# Patient Record
Sex: FEMALE | Race: WHITE | NOT HISPANIC OR LATINO | Employment: OTHER | URBAN - METROPOLITAN AREA
[De-identification: names, ages, dates, MRNs, and addresses within clinical notes are randomized per-mention and may not be internally consistent; named-entity substitution may affect disease eponyms.]

---

## 2018-07-08 ENCOUNTER — HOSPITAL ENCOUNTER (EMERGENCY)
Facility: HOSPITAL | Age: 63
Discharge: HOME | End: 2018-07-08
Attending: EMERGENCY MEDICINE | Admitting: EMERGENCY MEDICINE

## 2018-07-08 ENCOUNTER — APPOINTMENT (OUTPATIENT)
Dept: GENERAL RADIOLOGY | Facility: HOSPITAL | Age: 63
End: 2018-07-08

## 2018-07-08 VITALS
DIASTOLIC BLOOD PRESSURE: 64 MMHG | TEMPERATURE: 96.8 F | HEIGHT: 61 IN | WEIGHT: 200 LBS | HEART RATE: 66 BPM | BODY MASS INDEX: 37.76 KG/M2 | OXYGEN SATURATION: 92 % | RESPIRATION RATE: 18 BRPM | SYSTOLIC BLOOD PRESSURE: 121 MMHG

## 2018-07-08 DIAGNOSIS — M54.42 ACUTE LEFT-SIDED LOW BACK PAIN WITH LEFT-SIDED SCIATICA: Primary | ICD-10-CM

## 2018-07-08 PROCEDURE — 72110 X-RAY EXAM L-2 SPINE 4/>VWS: CPT

## 2018-07-08 PROCEDURE — 25010000002 KETOROLAC TROMETHAMINE PER 15 MG: Performed by: EMERGENCY MEDICINE

## 2018-07-08 PROCEDURE — 73502 X-RAY EXAM HIP UNI 2-3 VIEWS: CPT

## 2018-07-08 PROCEDURE — 63710000001 PREDNISONE PER 1 MG: Performed by: EMERGENCY MEDICINE

## 2018-07-08 PROCEDURE — 99283 EMERGENCY DEPT VISIT LOW MDM: CPT

## 2018-07-08 PROCEDURE — 96372 THER/PROPH/DIAG INJ SC/IM: CPT

## 2018-07-08 RX ORDER — HYDROCODONE BITARTRATE AND ACETAMINOPHEN 5; 325 MG/1; MG/1
1 TABLET ORAL EVERY 6 HOURS PRN
Qty: 12 TABLET | Refills: 0 | Status: SHIPPED | OUTPATIENT
Start: 2018-07-08 | End: 2020-01-01

## 2018-07-08 RX ORDER — PREDNISONE 20 MG/1
60 TABLET ORAL ONCE
Status: COMPLETED | OUTPATIENT
Start: 2018-07-08 | End: 2018-07-08

## 2018-07-08 RX ORDER — METHYLPREDNISOLONE 4 MG/1
TABLET ORAL
Qty: 21 EACH | Refills: 0 | Status: SHIPPED | OUTPATIENT
Start: 2018-07-08 | End: 2020-01-01

## 2018-07-08 RX ORDER — KETOROLAC TROMETHAMINE 30 MG/ML
30 INJECTION, SOLUTION INTRAMUSCULAR; INTRAVENOUS ONCE
Status: COMPLETED | OUTPATIENT
Start: 2018-07-08 | End: 2018-07-08

## 2018-07-08 RX ORDER — OXYCODONE HYDROCHLORIDE AND ACETAMINOPHEN 5; 325 MG/1; MG/1
1 TABLET ORAL ONCE
Status: COMPLETED | OUTPATIENT
Start: 2018-07-08 | End: 2018-07-08

## 2018-07-08 RX ORDER — NAPROXEN 500 MG/1
500 TABLET ORAL 2 TIMES DAILY PRN
Qty: 20 TABLET | Refills: 0 | Status: SHIPPED | OUTPATIENT
Start: 2018-07-08 | End: 2020-01-01

## 2018-07-08 RX ORDER — METAXALONE 800 MG/1
800 TABLET ORAL 3 TIMES DAILY
Qty: 15 TABLET | Refills: 0 | Status: SHIPPED | OUTPATIENT
Start: 2018-07-08 | End: 2020-01-01

## 2018-07-08 RX ADMIN — PREDNISONE 60 MG: 20 TABLET ORAL at 08:38

## 2018-07-08 RX ADMIN — OXYCODONE HYDROCHLORIDE AND ACETAMINOPHEN 1 TABLET: 5; 325 TABLET ORAL at 08:38

## 2018-07-08 RX ADMIN — KETOROLAC TROMETHAMINE 30 MG: 30 INJECTION, SOLUTION INTRAMUSCULAR at 08:38

## 2018-07-08 NOTE — ED PROVIDER NOTES
EMERGENCY DEPARTMENT ENCOUNTER    CHIEF COMPLAINT  Chief Complaint: lower back pain  History given by: patient  History limited by: nothing  Room Number: 26/26  PMD: Jorge Abel MD      HPI:  Pt is a 62 y.o. female who presents complaining of left lower back pain that began one week ago. Her pain began in the middle of her lower back and has since moved to the left lower back and posterior left hip. Her pain radiates down the posterior aspect of her left leg and it is worsened by movement or changing position. Pt denies saddle anaesthesia, focal weakness, and urinary or fecal incontinence. She has no other complaints at this time.    Duration:  One week  Onset: gradual  Timing: constant  Location: left lower back/posterior hip  Radiation: down posterior left leg  Quality: pain  Intensity/Severity: moderate  Progression: worsening  Associated Symptoms: none  Aggravating Factors: movement  Alleviating Factors: positional rest  Previous Episodes: none  Treatment before arrival: none    PAST MEDICAL HISTORY  Active Ambulatory Problems     Diagnosis Date Noted   • No Active Ambulatory Problems     Resolved Ambulatory Problems     Diagnosis Date Noted   • No Resolved Ambulatory Problems     Past Medical History:   Diagnosis Date   • Disease of thyroid gland    • Fibromyalgia    • Hypertension        PAST SURGICAL HISTORY  Past Surgical History:   Procedure Laterality Date   • APPENDECTOMY     • CHOLECYSTECTOMY     • KNEE SURGERY Right        FAMILY HISTORY  History reviewed. No pertinent family history.    SOCIAL HISTORY  Social History     Social History   • Marital status:      Spouse name: N/A   • Number of children: N/A   • Years of education: N/A     Occupational History   • Not on file.     Social History Main Topics   • Smoking status: Never Smoker   • Smokeless tobacco: Never Used   • Alcohol use No   • Drug use: No   • Sexual activity: Defer     Other Topics Concern   • Not on file     Social  History Narrative   • No narrative on file       ALLERGIES  Phenergan [promethazine hcl]    REVIEW OF SYSTEMS  Review of Systems   Constitutional: Negative for fever.   HENT: Negative for sore throat.    Eyes: Negative.    Respiratory: Negative for cough and shortness of breath.    Cardiovascular: Negative for chest pain.   Gastrointestinal: Negative for abdominal pain, diarrhea and vomiting.   Genitourinary: Negative for dysuria.   Musculoskeletal: Positive for arthralgias (left posterior hip) and back pain (left lower). Negative for neck pain.   Skin: Negative for rash.   Allergic/Immunologic: Negative.    Neurological: Negative for weakness, numbness and headaches.   Hematological: Negative.    Psychiatric/Behavioral: Negative.    All other systems reviewed and are negative.      PHYSICAL EXAM  ED Triage Vitals   Temp Heart Rate Resp BP SpO2   07/08/18 0740 07/08/18 0740 07/08/18 0740 07/08/18 0747 07/08/18 0740   96.8 °F (36 °C) 62 18 176/89 92 %      Temp src Heart Rate Source Patient Position BP Location FiO2 (%)   07/08/18 0740 07/08/18 0740 07/08/18 0747 -- --   Tympanic Monitor Sitting         Physical Exam   Constitutional: She is oriented to person, place, and time. No distress.   HENT:   Head: Normocephalic and atraumatic.   Eyes: EOM are normal. Pupils are equal, round, and reactive to light.   Neck: Normal range of motion. Neck supple.   Cardiovascular: Normal rate, regular rhythm and normal heart sounds.    Pulses:       Dorsalis pedis pulses are 2+ on the right side, and 2+ on the left side.   Pulmonary/Chest: Effort normal and breath sounds normal. No respiratory distress.   Abdominal: Soft. There is no tenderness. There is no rebound and no guarding.   Musculoskeletal: Normal range of motion. She exhibits no edema.   Mild tenderness of lower lumbar spine, left SI joint and left hip.    Neurological: She is alert and oriented to person, place, and time. She has normal sensation and normal strength.  She has an abnormal Straight Leg Raise Test (left at 30 degrees, normal on the right).   Normal sensation in feet. No saddle anaesthesia   Skin: Skin is warm and dry. No rash noted.   Psychiatric: Mood and affect normal.   Nursing note and vitals reviewed.        RADIOLOGY  XR Spine Lumbar 4+ View   Final Result       No acute fracture is identified. Degenerative changes. For further   evaluation, if clinically indicated, MRI could be considered.       This report was finalized on 7/8/2018 9:37 AM by Dr. Devin Esteves M.D.          XR Hip With or Without Pelvis 2 - 3 View Left   Final Result       No acute fracture is identified. If there is further clinical concern,   MRI could be considered for further evaluation.       This report was finalized on 7/8/2018 9:35 AM by Dr. Devin Esteves M.D.               I ordered the above noted radiological studies. Interpreted by radiologist. Reviewed by me in PACS.       PROCEDURES  Procedures      PROGRESS AND CONSULTS       0812  Ordered XR left hip and XR L-spine for further evaluation. Ordered prednisone, percocet, and toradol for her pain.    1009  Rechecked pt who is resting comfortably. Informed Pt that her XR L-spine and XR left hip show nothing acute. Informed Pt that her history and exam are consistent with sciatica. Discussed plans to discharge Pt home with prescriptions for pain medication, muscle relaxers, and oral steroids to alleviate her pain. Instructed her to rest and follow up with her PCP as needed if symptoms persist. Pt understands and agrees to all plans. All questions answered.     1014  HUNTER query complete. Treatment plan to include limited course of prescribed controlled substance. Risks including addiction, benefits, and alternatives presented to patient.   Pt filled an rx for 180 tramadol on 7/1/18    MEDICAL DECISION MAKING  Results were reviewed/discussed with the patient and they were also made aware of online access. Pt also made  aware that some labs, such as cultures, will not be resulted during ER visit and follow up with PMD is necessary.     MDM  Number of Diagnoses or Management Options  Acute left-sided low back pain with left-sided sciatica:   Diagnosis management comments: Patient had a normal neuro exam.  There was no saddle anesthesia.  She was able to ambulate.  X-ray showed some degenerative changes and lumbar spine.  Symptoms are consistent with sciatica.  Patient will be given prescriptions for Naprosyn, Skelaxin, Norco, and a Medrol Dosepak.  She was advised to follow-up with primary care doctor for persistent symptoms.       Amount and/or Complexity of Data Reviewed  Tests in the radiology section of CPT®: reviewed and ordered (XR L-spine and XR left hip show nothing acute.)           DIAGNOSIS  Final diagnoses:   Acute left-sided low back pain with left-sided sciatica       DISPOSITION  DISCHARGE    Patient discharged in stable condition.    Reviewed implications of results, diagnosis, meds, responsibility to follow up, warning signs and symptoms of possible worsening, potential complications and reasons to return to ER, including new or worsening symptoms.    Patient/Family voiced understanding of above instructions.    Discussed plan for discharge, as there is no emergent indication for admission. Patient referred to primary care provider for BP management due to today's BP. Pt/family is agreeable and understands need for follow up and repeat testing.  Pt is aware that discharge does not mean that nothing is wrong but it indicates no emergency is present that requires admission and they must continue care with follow-up as given below or physician of their choice.     FOLLOW-UP  Jorge Abel [place]  [address] [address] [address] [phone]    Call in 4 days  If symptoms persist         Medication List      New Prescriptions    HYDROcodone-acetaminophen 5-325 MG per tablet  Commonly known as:   NORCO  Take 1 tablet by mouth Every 6 (Six) Hours As Needed for Moderate Pain .     metaxalone 800 MG tablet  Commonly known as:  SKELAXIN  Take 1 tablet by mouth 3 (Three) Times a Day.     MethylPREDNISolone 4 MG tablet  Commonly known as:  MEDROL (BATSHEVA)  Take as directed on package instructions.     naproxen 500 MG tablet  Commonly known as:  NAPROSYN  Take 1 tablet by mouth 2 (Two) Times a Day As Needed for Moderate Pain .              Latest Documented Vital Signs:  As of 10:17 AM  BP- 124/67 HR- 62 Temp- 96.8 °F (36 °C) (Tympanic) O2 sat- 92%    --  Documentation assistance provided by maritza Lundy for Dr. Iqbal.  Information recorded by the scribe was done at my direction and has been verified and validated by me.     Sara Lundy  07/08/18 1017       Usman Iqbal MD  07/08/18 0875

## 2018-07-08 NOTE — DISCHARGE INSTRUCTIONS
Take medications as prescribed.  Do not take tramadol/Ultram while taking hydrocodone.  Follow-up with your doctor later this week if symptoms are not improving.  Return to emergency department for worsening pain, weakness in your legs, bowel or bladder problems, numbness in your groin, or other concern.

## 2018-07-08 NOTE — ED TRIAGE NOTES
LEFT LOWER BACK PAIN RADIATES DOWN LEFT LEG. DENIES KNOWN INJURY. PT STATES THIS HAS HAPPENED TO HER BEFORE NOT THIS SEVERE.

## 2019-01-01 ENCOUNTER — HOSPITAL ENCOUNTER (OUTPATIENT)
Dept: CARDIOLOGY | Facility: HOSPITAL | Age: 64
Discharge: HOME | End: 2019-10-16
Admitting: PAIN MEDICINE

## 2019-01-01 ENCOUNTER — TRANSCRIBE ORDERS (OUTPATIENT)
Dept: ADMINISTRATIVE | Facility: HOSPITAL | Age: 64
End: 2019-01-01

## 2019-01-01 DIAGNOSIS — M79.89 LEFT LEG SWELLING: ICD-10-CM

## 2019-01-01 DIAGNOSIS — M79.605 LEFT LEG PAIN: Primary | ICD-10-CM

## 2019-01-01 DIAGNOSIS — M79.605 LEFT LEG PAIN: ICD-10-CM

## 2019-01-01 LAB
BH CV LOW VAS LEFT POPLITEAL SPONT: 1
BH CV LOWER VASCULAR LEFT COMMON FEMORAL AUGMENT: NORMAL
BH CV LOWER VASCULAR LEFT COMMON FEMORAL COMPETENT: NORMAL
BH CV LOWER VASCULAR LEFT COMMON FEMORAL COMPRESS: NORMAL
BH CV LOWER VASCULAR LEFT COMMON FEMORAL PHASIC: NORMAL
BH CV LOWER VASCULAR LEFT COMMON FEMORAL SPONT: NORMAL
BH CV LOWER VASCULAR LEFT DISTAL FEMORAL COMPRESS: NORMAL
BH CV LOWER VASCULAR LEFT GASTRONEMIUS COMPRESS: NORMAL
BH CV LOWER VASCULAR LEFT GREATER SAPH AK COMPRESS: NORMAL
BH CV LOWER VASCULAR LEFT GREATER SAPH BK COMPRESS: NORMAL
BH CV LOWER VASCULAR LEFT MID FEMORAL AUGMENT: NORMAL
BH CV LOWER VASCULAR LEFT MID FEMORAL COMPETENT: NORMAL
BH CV LOWER VASCULAR LEFT MID FEMORAL COMPRESS: NORMAL
BH CV LOWER VASCULAR LEFT MID FEMORAL PHASIC: NORMAL
BH CV LOWER VASCULAR LEFT MID FEMORAL SPONT: NORMAL
BH CV LOWER VASCULAR LEFT PERONEAL COMPRESS: NORMAL
BH CV LOWER VASCULAR LEFT POPLITEAL AUGMENT: NORMAL
BH CV LOWER VASCULAR LEFT POPLITEAL COMPETENT: NORMAL
BH CV LOWER VASCULAR LEFT POPLITEAL COMPRESS: NORMAL
BH CV LOWER VASCULAR LEFT POPLITEAL PHASIC: NORMAL
BH CV LOWER VASCULAR LEFT POPLITEAL SPONT: NORMAL
BH CV LOWER VASCULAR LEFT POSTERIOR TIBIAL COMPRESS: NORMAL
BH CV LOWER VASCULAR LEFT PROXIMAL FEMORAL COMPRESS: NORMAL
BH CV LOWER VASCULAR LEFT SAPHENOFEMORAL JUNCTION COMPRESS: NORMAL
BH CV LOWER VASCULAR RIGHT COMMON FEMORAL AUGMENT: NORMAL
BH CV LOWER VASCULAR RIGHT COMMON FEMORAL COMPETENT: NORMAL
BH CV LOWER VASCULAR RIGHT COMMON FEMORAL COMPRESS: NORMAL
BH CV LOWER VASCULAR RIGHT COMMON FEMORAL PHASIC: NORMAL
BH CV LOWER VASCULAR RIGHT COMMON FEMORAL SPONT: NORMAL
BH CV POP FLUID COLLECT LEFT: 1

## 2019-01-01 PROCEDURE — 93971 EXTREMITY STUDY: CPT

## 2019-06-18 ENCOUNTER — APPOINTMENT (OUTPATIENT)
Dept: GENERAL RADIOLOGY | Facility: HOSPITAL | Age: 64
End: 2019-06-18

## 2019-06-18 ENCOUNTER — HOSPITAL ENCOUNTER (EMERGENCY)
Facility: HOSPITAL | Age: 64
Discharge: HOME | End: 2019-06-18
Attending: EMERGENCY MEDICINE | Admitting: EMERGENCY MEDICINE

## 2019-06-18 ENCOUNTER — APPOINTMENT (OUTPATIENT)
Dept: CT IMAGING | Facility: HOSPITAL | Age: 64
End: 2019-06-18

## 2019-06-18 VITALS
HEART RATE: 79 BPM | OXYGEN SATURATION: 93 % | HEIGHT: 61 IN | DIASTOLIC BLOOD PRESSURE: 93 MMHG | SYSTOLIC BLOOD PRESSURE: 129 MMHG | BODY MASS INDEX: 43.8 KG/M2 | RESPIRATION RATE: 16 BRPM | TEMPERATURE: 98.5 F | WEIGHT: 232 LBS

## 2019-06-18 DIAGNOSIS — R51.9 FACIAL PAIN, ACUTE: ICD-10-CM

## 2019-06-18 DIAGNOSIS — H66.92 LEFT OTITIS MEDIA, UNSPECIFIED OTITIS MEDIA TYPE: Primary | ICD-10-CM

## 2019-06-18 LAB
ALBUMIN SERPL-MCNC: 4.1 G/DL (ref 3.5–5.2)
ALBUMIN/GLOB SERPL: 1.1 G/DL
ALP SERPL-CCNC: 79 U/L (ref 39–117)
ALT SERPL W P-5'-P-CCNC: 17 U/L (ref 1–33)
ANION GAP SERPL CALCULATED.3IONS-SCNC: 14.7 MMOL/L
AST SERPL-CCNC: 17 U/L (ref 1–32)
B PARAPERT DNA SPEC QL NAA+PROBE: NOT DETECTED
B PERT DNA SPEC QL NAA+PROBE: NOT DETECTED
BACTERIA UR QL AUTO: ABNORMAL /HPF
BASOPHILS # BLD AUTO: 0.04 10*3/MM3 (ref 0–0.2)
BASOPHILS NFR BLD AUTO: 0.6 % (ref 0–1.5)
BILIRUB SERPL-MCNC: 0.6 MG/DL (ref 0.2–1.2)
BILIRUB UR QL STRIP: NEGATIVE
BUN BLD-MCNC: 30 MG/DL (ref 8–23)
BUN/CREAT SERPL: 21.3 (ref 7–25)
C PNEUM DNA NPH QL NAA+NON-PROBE: NOT DETECTED
CALCIUM SPEC-SCNC: 9.6 MG/DL (ref 8.6–10.5)
CHLORIDE SERPL-SCNC: 104 MMOL/L (ref 98–107)
CLARITY UR: CLEAR
CO2 SERPL-SCNC: 22.3 MMOL/L (ref 22–29)
COLOR UR: YELLOW
CREAT BLD-MCNC: 1.41 MG/DL (ref 0.57–1)
DEPRECATED RDW RBC AUTO: 44.3 FL (ref 37–54)
EOSINOPHIL # BLD AUTO: 0.24 10*3/MM3 (ref 0–0.4)
EOSINOPHIL NFR BLD AUTO: 3.7 % (ref 0.3–6.2)
ERYTHROCYTE [DISTWIDTH] IN BLOOD BY AUTOMATED COUNT: 13.1 % (ref 12.3–15.4)
FLUAV H1 2009 PAND RNA NPH QL NAA+PROBE: NOT DETECTED
FLUAV H1 HA GENE NPH QL NAA+PROBE: NOT DETECTED
FLUAV H3 RNA NPH QL NAA+PROBE: NOT DETECTED
FLUAV SUBTYP SPEC NAA+PROBE: NOT DETECTED
FLUBV RNA ISLT QL NAA+PROBE: NOT DETECTED
GFR SERPL CREATININE-BSD FRML MDRD: 38 ML/MIN/1.73
GLOBULIN UR ELPH-MCNC: 3.9 GM/DL
GLUCOSE BLD-MCNC: 97 MG/DL (ref 65–99)
GLUCOSE UR STRIP-MCNC: NEGATIVE MG/DL
HADV DNA SPEC NAA+PROBE: NOT DETECTED
HCOV 229E RNA SPEC QL NAA+PROBE: NOT DETECTED
HCOV HKU1 RNA SPEC QL NAA+PROBE: NOT DETECTED
HCOV NL63 RNA SPEC QL NAA+PROBE: NOT DETECTED
HCOV OC43 RNA SPEC QL NAA+PROBE: NOT DETECTED
HCT VFR BLD AUTO: 39.2 % (ref 34–46.6)
HGB BLD-MCNC: 12.8 G/DL (ref 12–15.9)
HGB UR QL STRIP.AUTO: NEGATIVE
HMPV RNA NPH QL NAA+NON-PROBE: NOT DETECTED
HOLD SPECIMEN: NORMAL
HPIV1 RNA SPEC QL NAA+PROBE: NOT DETECTED
HPIV2 RNA SPEC QL NAA+PROBE: NOT DETECTED
HPIV3 RNA NPH QL NAA+PROBE: NOT DETECTED
HPIV4 P GENE NPH QL NAA+PROBE: NOT DETECTED
HYALINE CASTS UR QL AUTO: ABNORMAL /LPF
IMM GRANULOCYTES # BLD AUTO: 0.02 10*3/MM3 (ref 0–0.05)
IMM GRANULOCYTES NFR BLD AUTO: 0.3 % (ref 0–0.5)
KETONES UR QL STRIP: NEGATIVE
LEUKOCYTE ESTERASE UR QL STRIP.AUTO: ABNORMAL
LYMPHOCYTES # BLD AUTO: 1.04 10*3/MM3 (ref 0.7–3.1)
LYMPHOCYTES NFR BLD AUTO: 16 % (ref 19.6–45.3)
M PNEUMO IGG SER IA-ACNC: NOT DETECTED
MCH RBC QN AUTO: 30 PG (ref 26.6–33)
MCHC RBC AUTO-ENTMCNC: 32.7 G/DL (ref 31.5–35.7)
MCV RBC AUTO: 92 FL (ref 79–97)
MONOCYTES # BLD AUTO: 0.51 10*3/MM3 (ref 0.1–0.9)
MONOCYTES NFR BLD AUTO: 7.8 % (ref 5–12)
NEUTROPHILS # BLD AUTO: 4.66 10*3/MM3 (ref 1.7–7)
NEUTROPHILS NFR BLD AUTO: 71.6 % (ref 42.7–76)
NITRITE UR QL STRIP: NEGATIVE
NRBC BLD AUTO-RTO: 0 /100 WBC (ref 0–0.2)
PH UR STRIP.AUTO: <=5 [PH] (ref 5–8)
PLATELET # BLD AUTO: 326 10*3/MM3 (ref 140–450)
PMV BLD AUTO: 10.5 FL (ref 6–12)
POTASSIUM BLD-SCNC: 3.5 MMOL/L (ref 3.5–5.2)
PROT SERPL-MCNC: 8 G/DL (ref 6–8.5)
PROT UR QL STRIP: NEGATIVE
RBC # BLD AUTO: 4.26 10*6/MM3 (ref 3.77–5.28)
RBC # UR: ABNORMAL /HPF
REF LAB TEST METHOD: ABNORMAL
RHINOVIRUS RNA SPEC NAA+PROBE: NOT DETECTED
RSV RNA NPH QL NAA+NON-PROBE: NOT DETECTED
SODIUM BLD-SCNC: 141 MMOL/L (ref 136–145)
SP GR UR STRIP: 1.02 (ref 1–1.03)
SQUAMOUS #/AREA URNS HPF: ABNORMAL /HPF
TROPONIN T SERPL-MCNC: <0.01 NG/ML (ref 0–0.03)
UROBILINOGEN UR QL STRIP: ABNORMAL
WBC NRBC COR # BLD: 6.51 10*3/MM3 (ref 3.4–10.8)
WBC UR QL AUTO: ABNORMAL /HPF
WHOLE BLOOD HOLD SPECIMEN: NORMAL

## 2019-06-18 PROCEDURE — 25010000002 KETOROLAC TROMETHAMINE PER 15 MG: Performed by: NURSE PRACTITIONER

## 2019-06-18 PROCEDURE — 87486 CHLMYD PNEUM DNA AMP PROBE: CPT | Performed by: NURSE PRACTITIONER

## 2019-06-18 PROCEDURE — 96374 THER/PROPH/DIAG INJ IV PUSH: CPT

## 2019-06-18 PROCEDURE — 70491 CT SOFT TISSUE NECK W/DYE: CPT

## 2019-06-18 PROCEDURE — 84484 ASSAY OF TROPONIN QUANT: CPT | Performed by: NURSE PRACTITIONER

## 2019-06-18 PROCEDURE — 93010 ELECTROCARDIOGRAM REPORT: CPT | Performed by: INTERNAL MEDICINE

## 2019-06-18 PROCEDURE — 87581 M.PNEUMON DNA AMP PROBE: CPT | Performed by: NURSE PRACTITIONER

## 2019-06-18 PROCEDURE — 25010000002 IOPAMIDOL 61 % SOLUTION: Performed by: EMERGENCY MEDICINE

## 2019-06-18 PROCEDURE — 81001 URINALYSIS AUTO W/SCOPE: CPT | Performed by: NURSE PRACTITIONER

## 2019-06-18 PROCEDURE — 87633 RESP VIRUS 12-25 TARGETS: CPT | Performed by: NURSE PRACTITIONER

## 2019-06-18 PROCEDURE — 87798 DETECT AGENT NOS DNA AMP: CPT | Performed by: NURSE PRACTITIONER

## 2019-06-18 PROCEDURE — 96375 TX/PRO/DX INJ NEW DRUG ADDON: CPT

## 2019-06-18 PROCEDURE — 80053 COMPREHEN METABOLIC PANEL: CPT | Performed by: NURSE PRACTITIONER

## 2019-06-18 PROCEDURE — 93005 ELECTROCARDIOGRAM TRACING: CPT | Performed by: NURSE PRACTITIONER

## 2019-06-18 PROCEDURE — 85025 COMPLETE CBC W/AUTO DIFF WBC: CPT | Performed by: NURSE PRACTITIONER

## 2019-06-18 PROCEDURE — 99284 EMERGENCY DEPT VISIT MOD MDM: CPT

## 2019-06-18 PROCEDURE — 25010000002 MORPHINE PER 10 MG: Performed by: NURSE PRACTITIONER

## 2019-06-18 PROCEDURE — 71045 X-RAY EXAM CHEST 1 VIEW: CPT

## 2019-06-18 PROCEDURE — 25010000002 ONDANSETRON PER 1 MG: Performed by: NURSE PRACTITIONER

## 2019-06-18 RX ORDER — AMLODIPINE BESYLATE 5 MG/1
5 TABLET ORAL DAILY
COMMUNITY
End: 2020-01-01 | Stop reason: HOSPADM

## 2019-06-18 RX ORDER — KETOROLAC TROMETHAMINE 15 MG/ML
15 INJECTION, SOLUTION INTRAMUSCULAR; INTRAVENOUS ONCE
Status: COMPLETED | OUTPATIENT
Start: 2019-06-18 | End: 2019-06-18

## 2019-06-18 RX ORDER — CEFDINIR 300 MG/1
300 CAPSULE ORAL 2 TIMES DAILY
Qty: 14 CAPSULE | Refills: 0 | Status: SHIPPED | OUTPATIENT
Start: 2019-06-18 | End: 2019-06-25

## 2019-06-18 RX ORDER — SODIUM CHLORIDE 0.9 % (FLUSH) 0.9 %
10 SYRINGE (ML) INJECTION AS NEEDED
Status: DISCONTINUED | OUTPATIENT
Start: 2019-06-18 | End: 2019-06-18 | Stop reason: HOSPADM

## 2019-06-18 RX ORDER — ONDANSETRON 4 MG/1
4 TABLET, FILM COATED ORAL EVERY 8 HOURS PRN
Qty: 10 TABLET | Refills: 0 | Status: SHIPPED | OUTPATIENT
Start: 2019-06-18 | End: 2020-01-01

## 2019-06-18 RX ORDER — LOSARTAN POTASSIUM 25 MG/1
25 TABLET ORAL DAILY
COMMUNITY
End: 2020-01-01 | Stop reason: DRUGHIGH

## 2019-06-18 RX ORDER — ONDANSETRON 2 MG/ML
4 INJECTION INTRAMUSCULAR; INTRAVENOUS ONCE
Status: COMPLETED | OUTPATIENT
Start: 2019-06-18 | End: 2019-06-18

## 2019-06-18 RX ORDER — MORPHINE SULFATE 2 MG/ML
4 INJECTION, SOLUTION INTRAMUSCULAR; INTRAVENOUS ONCE
Status: COMPLETED | OUTPATIENT
Start: 2019-06-18 | End: 2019-06-18

## 2019-06-18 RX ADMIN — KETOROLAC TROMETHAMINE 15 MG: 15 INJECTION, SOLUTION INTRAMUSCULAR; INTRAVENOUS at 15:49

## 2019-06-18 RX ADMIN — ONDANSETRON 4 MG: 2 INJECTION INTRAMUSCULAR; INTRAVENOUS at 16:20

## 2019-06-18 RX ADMIN — ONDANSETRON HYDROCHLORIDE 4 MG: 2 SOLUTION INTRAMUSCULAR; INTRAVENOUS at 15:50

## 2019-06-18 RX ADMIN — SODIUM CHLORIDE 1000 ML: 9 INJECTION, SOLUTION INTRAVENOUS at 15:49

## 2019-06-18 RX ADMIN — MORPHINE SULFATE 4 MG: 2 INJECTION, SOLUTION INTRAMUSCULAR; INTRAVENOUS at 16:20

## 2019-06-18 RX ADMIN — IOPAMIDOL 75 ML: 612 INJECTION, SOLUTION INTRAVENOUS at 17:23

## 2020-01-01 ENCOUNTER — APPOINTMENT (OUTPATIENT)
Dept: CT IMAGING | Facility: HOSPITAL | Age: 65
End: 2020-01-01

## 2020-01-01 ENCOUNTER — OFFICE VISIT (OUTPATIENT)
Dept: GASTROENTEROLOGY | Facility: CLINIC | Age: 65
End: 2020-01-01

## 2020-01-01 ENCOUNTER — APPOINTMENT (OUTPATIENT)
Dept: GENERAL RADIOLOGY | Facility: HOSPITAL | Age: 65
End: 2020-01-01

## 2020-01-01 ENCOUNTER — APPOINTMENT (OUTPATIENT)
Dept: CARDIOLOGY | Facility: HOSPITAL | Age: 65
End: 2020-01-01

## 2020-01-01 ENCOUNTER — APPOINTMENT (OUTPATIENT)
Dept: NEUROLOGY | Facility: HOSPITAL | Age: 65
End: 2020-01-01

## 2020-01-01 ENCOUNTER — OUTSIDE FACILITY SERVICE (OUTPATIENT)
Dept: GASTROENTEROLOGY | Facility: CLINIC | Age: 65
End: 2020-01-01

## 2020-01-01 ENCOUNTER — READMISSION MANAGEMENT (OUTPATIENT)
Dept: CALL CENTER | Facility: HOSPITAL | Age: 65
End: 2020-01-01

## 2020-01-01 ENCOUNTER — HOSPITAL ENCOUNTER (OUTPATIENT)
Dept: NUCLEAR MEDICINE | Facility: HOSPITAL | Age: 65
Discharge: HOME | End: 2020-02-03

## 2020-01-01 ENCOUNTER — TRANSCRIBE ORDERS (OUTPATIENT)
Dept: ADMINISTRATIVE | Facility: HOSPITAL | Age: 65
End: 2020-01-01

## 2020-01-01 ENCOUNTER — HOSPITAL ENCOUNTER (INPATIENT)
Facility: HOSPITAL | Age: 65
LOS: 3 days | Discharge: HOME | End: 2020-07-12
Attending: EMERGENCY MEDICINE | Admitting: INTERNAL MEDICINE

## 2020-01-01 ENCOUNTER — PREP FOR SURGERY (OUTPATIENT)
Dept: OTHER | Facility: HOSPITAL | Age: 65
End: 2020-01-01

## 2020-01-01 ENCOUNTER — HOSPITAL ENCOUNTER (INPATIENT)
Facility: HOSPITAL | Age: 65
LOS: 2 days | Discharge: HOME | End: 2020-07-24
Attending: EMERGENCY MEDICINE | Admitting: HOSPITALIST

## 2020-01-01 ENCOUNTER — LAB REQUISITION (OUTPATIENT)
Dept: LAB | Facility: HOSPITAL | Age: 65
End: 2020-01-01

## 2020-01-01 ENCOUNTER — HOSPITAL ENCOUNTER (INPATIENT)
Facility: HOSPITAL | Age: 65
LOS: 6 days | End: 2020-08-19
Attending: EMERGENCY MEDICINE | Admitting: INTERNAL MEDICINE

## 2020-01-01 VITALS
RESPIRATION RATE: 18 BRPM | WEIGHT: 239.42 LBS | SYSTOLIC BLOOD PRESSURE: 145 MMHG | TEMPERATURE: 98.2 F | HEIGHT: 61 IN | HEART RATE: 75 BPM | DIASTOLIC BLOOD PRESSURE: 85 MMHG | BODY MASS INDEX: 45.2 KG/M2 | OXYGEN SATURATION: 96 %

## 2020-01-01 VITALS — HEIGHT: 61 IN | BODY MASS INDEX: 47.7 KG/M2 | WEIGHT: 252.65 LBS | TEMPERATURE: 99.5 F

## 2020-01-01 VITALS
WEIGHT: 236.33 LBS | OXYGEN SATURATION: 90 % | HEART RATE: 90 BPM | RESPIRATION RATE: 18 BRPM | DIASTOLIC BLOOD PRESSURE: 70 MMHG | SYSTOLIC BLOOD PRESSURE: 123 MMHG | HEIGHT: 61 IN | TEMPERATURE: 98.3 F | BODY MASS INDEX: 44.62 KG/M2

## 2020-01-01 VITALS
DIASTOLIC BLOOD PRESSURE: 88 MMHG | BODY MASS INDEX: 43.61 KG/M2 | SYSTOLIC BLOOD PRESSURE: 140 MMHG | OXYGEN SATURATION: 97 % | WEIGHT: 231 LBS | HEIGHT: 61 IN | TEMPERATURE: 98.1 F | HEART RATE: 101 BPM

## 2020-01-01 DIAGNOSIS — I10 HYPERTENSION, UNSPECIFIED TYPE: ICD-10-CM

## 2020-01-01 DIAGNOSIS — I61.9 INTRAPARENCHYMAL HEMORRHAGE OF BRAIN (HCC): Primary | ICD-10-CM

## 2020-01-01 DIAGNOSIS — R11.0 NAUSEA: ICD-10-CM

## 2020-01-01 DIAGNOSIS — I82.492 ACUTE DEEP VEIN THROMBOSIS (DVT) OF OTHER SPECIFIED VEIN OF LEFT LOWER EXTREMITY (HCC): ICD-10-CM

## 2020-01-01 DIAGNOSIS — G90.8 AUTOIMMUNE AUTONOMIC NEUROPATHY: ICD-10-CM

## 2020-01-01 DIAGNOSIS — N17.9 ACUTE KIDNEY INJURY (HCC): ICD-10-CM

## 2020-01-01 DIAGNOSIS — R11.2 NAUSEA AND VOMITING IN ADULT: Primary | ICD-10-CM

## 2020-01-01 DIAGNOSIS — R68.81 EARLY SATIETY: ICD-10-CM

## 2020-01-01 DIAGNOSIS — K76.9 HEPATIC NEUROPATHY: Primary | ICD-10-CM

## 2020-01-01 DIAGNOSIS — M54.9 BILATERAL BACK PAIN, UNSPECIFIED BACK LOCATION, UNSPECIFIED CHRONICITY: ICD-10-CM

## 2020-01-01 DIAGNOSIS — R79.1 ELEVATED INR: ICD-10-CM

## 2020-01-01 DIAGNOSIS — Z78.9 FAILURE OF OUTPATIENT TREATMENT: ICD-10-CM

## 2020-01-01 DIAGNOSIS — R19.7 DIARRHEA, UNSPECIFIED TYPE: ICD-10-CM

## 2020-01-01 DIAGNOSIS — I26.99 PULMONARY EMBOLISM, BILATERAL (HCC): Primary | ICD-10-CM

## 2020-01-01 DIAGNOSIS — I82.4Y3 DEEP VEIN THROMBOSIS (DVT) OF PROXIMAL VEIN OF BOTH LOWER EXTREMITIES, UNSPECIFIED CHRONICITY (HCC): Primary | ICD-10-CM

## 2020-01-01 DIAGNOSIS — R09.02 HYPOXIA: ICD-10-CM

## 2020-01-01 DIAGNOSIS — G90.8 AUTOIMMUNE AUTONOMIC NEUROPATHY: Primary | ICD-10-CM

## 2020-01-01 DIAGNOSIS — R11.2 NAUSEA WITH VOMITING, UNSPECIFIED: ICD-10-CM

## 2020-01-01 DIAGNOSIS — Z12.11 COLON CANCER SCREENING: ICD-10-CM

## 2020-01-01 DIAGNOSIS — G99.0 HEPATIC NEUROPATHY: Primary | ICD-10-CM

## 2020-01-01 DIAGNOSIS — R77.8 ELEVATED TROPONIN: ICD-10-CM

## 2020-01-01 LAB
ABO GROUP BLD: NORMAL
ALBUMIN SERPL-MCNC: 2.3 G/DL (ref 3.5–5.2)
ALBUMIN SERPL-MCNC: 2.8 G/DL (ref 3.5–5.2)
ALBUMIN SERPL-MCNC: 2.9 G/DL (ref 3.5–5.2)
ALBUMIN SERPL-MCNC: 3.4 G/DL (ref 3.5–5.2)
ALBUMIN SERPL-MCNC: 3.8 G/DL (ref 3.5–5.2)
ALBUMIN SERPL-MCNC: 3.8 G/DL (ref 3.5–5.2)
ALBUMIN SERPL-MCNC: 3.9 G/DL (ref 3.5–5.2)
ALBUMIN SERPL-MCNC: 4 G/DL (ref 3.5–5.2)
ALBUMIN/GLOB SERPL: 0.7 G/DL
ALBUMIN/GLOB SERPL: 0.8 G/DL
ALBUMIN/GLOB SERPL: 0.9 G/DL
ALBUMIN/GLOB SERPL: 0.9 G/DL
ALBUMIN/GLOB SERPL: 1.1 G/DL
ALP SERPL-CCNC: 52 U/L (ref 39–117)
ALP SERPL-CCNC: 57 U/L (ref 39–117)
ALP SERPL-CCNC: 57 U/L (ref 39–117)
ALP SERPL-CCNC: 66 U/L (ref 39–117)
ALP SERPL-CCNC: 67 U/L (ref 39–117)
ALP SERPL-CCNC: 72 U/L (ref 39–117)
ALP SERPL-CCNC: 94 U/L (ref 39–117)
ALT SERPL W P-5'-P-CCNC: 17 U/L (ref 1–33)
ALT SERPL W P-5'-P-CCNC: 18 U/L (ref 1–33)
ALT SERPL W P-5'-P-CCNC: 18 U/L (ref 1–33)
ALT SERPL W P-5'-P-CCNC: 19 U/L (ref 1–33)
ALT SERPL W P-5'-P-CCNC: 19 U/L (ref 1–33)
ALT SERPL W P-5'-P-CCNC: 52 U/L (ref 1–33)
ALT SERPL W P-5'-P-CCNC: 70 U/L (ref 1–33)
ANION GAP SERPL CALCULATED.3IONS-SCNC: 10 MMOL/L (ref 5–15)
ANION GAP SERPL CALCULATED.3IONS-SCNC: 10.2 MMOL/L (ref 5–15)
ANION GAP SERPL CALCULATED.3IONS-SCNC: 10.6 MMOL/L (ref 5–15)
ANION GAP SERPL CALCULATED.3IONS-SCNC: 11 MMOL/L (ref 5–15)
ANION GAP SERPL CALCULATED.3IONS-SCNC: 11.2 MMOL/L (ref 5–15)
ANION GAP SERPL CALCULATED.3IONS-SCNC: 11.2 MMOL/L (ref 5–15)
ANION GAP SERPL CALCULATED.3IONS-SCNC: 11.6 MMOL/L (ref 5–15)
ANION GAP SERPL CALCULATED.3IONS-SCNC: 12 MMOL/L (ref 5–15)
ANION GAP SERPL CALCULATED.3IONS-SCNC: 12.2 MMOL/L (ref 5–15)
ANION GAP SERPL CALCULATED.3IONS-SCNC: 13.3 MMOL/L (ref 5–15)
ANION GAP SERPL CALCULATED.3IONS-SCNC: 6.5 MMOL/L (ref 5–15)
ANION GAP SERPL CALCULATED.3IONS-SCNC: 7.6 MMOL/L (ref 5–15)
ANION GAP SERPL CALCULATED.3IONS-SCNC: 7.8 MMOL/L (ref 5–15)
ANION GAP SERPL CALCULATED.3IONS-SCNC: 8.5 MMOL/L (ref 5–15)
ANION GAP SERPL CALCULATED.3IONS-SCNC: 9.9 MMOL/L (ref 5–15)
ANTI-PHOSPHATIDIC ACID: ABNORMAL
ANTI-PHOSPHATIDIC,IGA: 3.9 U/ML
ANTI-PHOSPHATIDIC,IGG: 2 U/ML
ANTI-PHOSPHATIDIC,IGM: 14.7 U/ML
ANTI-PHOSPHATIDYL GLYCEROL, IGA: 5 U/ML
ANTI-PHOSPHATIDYL GLYCEROL, IGG: 6.1 U/ML
ANTI-PHOSPHATIDYL GLYCEROL, IGM: 4.4 U/ML
ANTI-PHOSPHATIDYL GLYCEROL: ABNORMAL
ANTI-PHOSPHATIDYL INOSITOL: ABNORMAL
ANTI-PHOSPHATIDYL,IGA: 5.3 U/ML
ANTI-PHOSPHATIDYL,IGG: 3.8 U/ML
ANTI-PHOSPHATIDYL,IGM: 3.5 U/ML
ANTI-PHOSPHATIDYLETHANOLAMINE, IGA: 8.9 U/ML
ANTI-PHOSPHATIDYLETHANOLAMINE, IGG: 4.9 U/ML
ANTI-PHOSPHATIDYLETHANOLAMINE, IGM: 0.9 U/ML
ANTI-PHOSPHATIDYLETHANOLAMINE: ABNORMAL
APTT PPP: 126.4 SECONDS (ref 22.7–35.4)
APTT PPP: 24.8 SECONDS (ref 22.7–35.4)
APTT PPP: 26.9 SECONDS (ref 22.7–35.4)
APTT PPP: 28.7 SECONDS (ref 22.7–35.4)
APTT PPP: 35.3 SECONDS (ref 22.7–35.4)
APTT PPP: 64.3 SECONDS (ref 22.7–35.4)
APTT PPP: 74.1 SECONDS (ref 22.7–35.4)
APTT PPP: 81.9 SECONDS (ref 22.7–35.4)
APTT PPP: >200 SECONDS (ref 22.7–35.4)
ARTERIAL PATENCY WRIST A: POSITIVE
AST SERPL-CCNC: 15 U/L (ref 1–32)
AST SERPL-CCNC: 16 U/L (ref 1–32)
AST SERPL-CCNC: 17 U/L (ref 1–32)
AST SERPL-CCNC: 18 U/L (ref 1–32)
AST SERPL-CCNC: 20 U/L (ref 1–32)
AST SERPL-CCNC: 51 U/L (ref 1–32)
AST SERPL-CCNC: 56 U/L (ref 1–32)
AT III PPP CHRO-ACNC: 96 % (ref 90–134)
ATMOSPHERIC PRESS: 751.9 MMHG
B PARAPERT DNA SPEC QL NAA+PROBE: NOT DETECTED
B PERT DNA SPEC QL NAA+PROBE: NOT DETECTED
B2 GLYCOPROT1 IGA SER-ACNC: <9 GPI IGA UNITS (ref 0–25)
B2 GLYCOPROT1 IGG SER-ACNC: <9 GPI IGG UNITS (ref 0–20)
B2 GLYCOPROT1 IGM SER-ACNC: <9 GPI IGM UNITS (ref 0–32)
BACTERIA SPEC RESP CULT: ABNORMAL
BASE EXCESS BLDA CALC-SCNC: 2.1 MMOL/L (ref 0–2)
BASOPHILS # BLD AUTO: 0.02 10*3/MM3 (ref 0–0.2)
BASOPHILS # BLD AUTO: 0.03 10*3/MM3 (ref 0–0.2)
BASOPHILS # BLD AUTO: 0.03 10*3/MM3 (ref 0–0.2)
BASOPHILS # BLD AUTO: 0.04 10*3/MM3 (ref 0–0.2)
BASOPHILS # BLD AUTO: 0.08 10*3/MM3 (ref 0–0.2)
BASOPHILS NFR BLD AUTO: 0.2 % (ref 0–1.5)
BASOPHILS NFR BLD AUTO: 0.3 % (ref 0–1.5)
BASOPHILS NFR BLD AUTO: 0.3 % (ref 0–1.5)
BASOPHILS NFR BLD AUTO: 0.4 % (ref 0–1.5)
BASOPHILS NFR BLD AUTO: 0.5 % (ref 0–1.5)
BASOPHILS NFR BLD AUTO: 0.7 % (ref 0–1.5)
BASOPHILS NFR BLD AUTO: 0.8 % (ref 0–1.5)
BDY SITE: ABNORMAL
BH CV ECHO MEAS - AO MAX PG (FULL): 5.3 MMHG
BH CV ECHO MEAS - AO MAX PG: 10.1 MMHG
BH CV ECHO MEAS - AO MEAN PG (FULL): 2 MMHG
BH CV ECHO MEAS - AO MEAN PG: 4 MMHG
BH CV ECHO MEAS - AO V2 MAX: 159 CM/SEC
BH CV ECHO MEAS - AO V2 MEAN: 95.6 CM/SEC
BH CV ECHO MEAS - AO V2 VTI: 23.5 CM
BH CV ECHO MEAS - AVA(I,A): 2.8 CM^2
BH CV ECHO MEAS - AVA(I,D): 2.8 CM^2
BH CV ECHO MEAS - AVA(V,A): 2.4 CM^2
BH CV ECHO MEAS - AVA(V,D): 2.4 CM^2
BH CV ECHO MEAS - BSA(HAYCOCK): 2.2 M^2
BH CV ECHO MEAS - BSA: 2 M^2
BH CV ECHO MEAS - BZI_BMI: 43.9 KILOGRAMS/M^2
BH CV ECHO MEAS - BZI_METRIC_HEIGHT: 154 CM
BH CV ECHO MEAS - BZI_METRIC_WEIGHT: 104 KG
BH CV ECHO MEAS - EDV(CUBED): 59.3 ML
BH CV ECHO MEAS - EDV(MOD-SP4): 58 ML
BH CV ECHO MEAS - EDV(TEICH): 65.9 ML
BH CV ECHO MEAS - EF(CUBED): 73.7 %
BH CV ECHO MEAS - EF(MOD-SP4): 77.6 %
BH CV ECHO MEAS - EF(TEICH): 66.1 %
BH CV ECHO MEAS - ESV(CUBED): 15.6 ML
BH CV ECHO MEAS - ESV(MOD-SP4): 13 ML
BH CV ECHO MEAS - ESV(TEICH): 22.3 ML
BH CV ECHO MEAS - FS: 35.9 %
BH CV ECHO MEAS - IVS/LVPW: 0.91
BH CV ECHO MEAS - IVSD: 1 CM
BH CV ECHO MEAS - LV DIASTOLIC VOL/BSA (35-75): 29.1 ML/M^2
BH CV ECHO MEAS - LV MASS(C)D: 131 GRAMS
BH CV ECHO MEAS - LV MASS(C)DI: 65.7 GRAMS/M^2
BH CV ECHO MEAS - LV MAX PG: 4.8 MMHG
BH CV ECHO MEAS - LV MEAN PG: 2 MMHG
BH CV ECHO MEAS - LV SYSTOLIC VOL/BSA (12-30): 6.5 ML/M^2
BH CV ECHO MEAS - LV V1 MAX: 110 CM/SEC
BH CV ECHO MEAS - LV V1 MEAN: 73.4 CM/SEC
BH CV ECHO MEAS - LV V1 VTI: 18.9 CM
BH CV ECHO MEAS - LVIDD: 3.9 CM
BH CV ECHO MEAS - LVIDS: 2.5 CM
BH CV ECHO MEAS - LVLD AP4: 7.8 CM
BH CV ECHO MEAS - LVLS AP4: 4.8 CM
BH CV ECHO MEAS - LVOT AREA (M): 3.5 CM^2
BH CV ECHO MEAS - LVOT AREA: 3.5 CM^2
BH CV ECHO MEAS - LVOT DIAM: 2.1 CM
BH CV ECHO MEAS - LVPWD: 1.1 CM
BH CV ECHO MEAS - PA ACC TIME: 0.05 SEC
BH CV ECHO MEAS - PA PR(ACCEL): 56.5 MMHG
BH CV ECHO MEAS - RV MAX PG: 2.5 MMHG
BH CV ECHO MEAS - RV MEAN PG: 1 MMHG
BH CV ECHO MEAS - RV V1 MAX: 79 CM/SEC
BH CV ECHO MEAS - RV V1 MEAN: 50.7 CM/SEC
BH CV ECHO MEAS - RV V1 VTI: 16.2 CM
BH CV ECHO MEAS - RVSP: 57 MMHG
BH CV ECHO MEAS - SI(CUBED): 21.9 ML/M^2
BH CV ECHO MEAS - SI(LVOT): 32.8 ML/M^2
BH CV ECHO MEAS - SI(MOD-SP4): 22.6 ML/M^2
BH CV ECHO MEAS - SI(TEICH): 21.9 ML/M^2
BH CV ECHO MEAS - SV(CUBED): 43.7 ML
BH CV ECHO MEAS - SV(LVOT): 65.5 ML
BH CV ECHO MEAS - SV(MOD-SP4): 45 ML
BH CV ECHO MEAS - SV(TEICH): 43.6 ML
BH CV ECHO MEAS - TR MAX PG: 49 MMHG
BH CV ECHO MEAS - TR MAX VEL: 358 CM/SEC
BH CV LOW VAS LEFT LESSER SAPH VESSEL: 1
BH CV LOW VAS LEFT POPLITEAL SPONT: 1
BH CV LOW VAS RIGHT POSTERIOR TIBIAL VESSEL: 1
BH CV LOW VAS RIGHT POSTERIOR TIBIAL VESSEL: 1
BH CV LOWER VASCULAR LEFT COMMON FEMORAL AUGMENT: NORMAL
BH CV LOWER VASCULAR LEFT COMMON FEMORAL COMPETENT: NORMAL
BH CV LOWER VASCULAR LEFT COMMON FEMORAL COMPRESS: NORMAL
BH CV LOWER VASCULAR LEFT COMMON FEMORAL PHASIC: NORMAL
BH CV LOWER VASCULAR LEFT COMMON FEMORAL SPONT: NORMAL
BH CV LOWER VASCULAR LEFT DISTAL FEMORAL COMPRESS: NORMAL
BH CV LOWER VASCULAR LEFT GASTRONEMIUS COMPRESS: NORMAL
BH CV LOWER VASCULAR LEFT GREATER SAPH AK COMPRESS: NORMAL
BH CV LOWER VASCULAR LEFT GREATER SAPH BK COMPRESS: NORMAL
BH CV LOWER VASCULAR LEFT LESSER SAPH COMPRESS: NORMAL
BH CV LOWER VASCULAR LEFT LESSER SAPH THROMBUS: NORMAL
BH CV LOWER VASCULAR LEFT MID FEMORAL AUGMENT: NORMAL
BH CV LOWER VASCULAR LEFT MID FEMORAL COMPETENT: NORMAL
BH CV LOWER VASCULAR LEFT MID FEMORAL COMPRESS: NORMAL
BH CV LOWER VASCULAR LEFT MID FEMORAL PHASIC: NORMAL
BH CV LOWER VASCULAR LEFT MID FEMORAL SPONT: NORMAL
BH CV LOWER VASCULAR LEFT PERONEAL COMPRESS: NORMAL
BH CV LOWER VASCULAR LEFT POPLITEAL AUGMENT: NORMAL
BH CV LOWER VASCULAR LEFT POPLITEAL AUGMENT: NORMAL
BH CV LOWER VASCULAR LEFT POPLITEAL COMPETENT: NORMAL
BH CV LOWER VASCULAR LEFT POPLITEAL COMPETENT: NORMAL
BH CV LOWER VASCULAR LEFT POPLITEAL COMPRESS: NORMAL
BH CV LOWER VASCULAR LEFT POPLITEAL PHASIC: NORMAL
BH CV LOWER VASCULAR LEFT POPLITEAL SPONT: NORMAL
BH CV LOWER VASCULAR LEFT POPLITEAL THROMBUS: NORMAL
BH CV LOWER VASCULAR LEFT POSTERIOR TIBIAL COMPRESS: NORMAL
BH CV LOWER VASCULAR LEFT PROFUNDA FEMORAL COMPRESS: NORMAL
BH CV LOWER VASCULAR LEFT PROXIMAL FEMORAL COMPRESS: NORMAL
BH CV LOWER VASCULAR LEFT SAPHENOFEMORAL JUNCTION COMPRESS: NORMAL
BH CV LOWER VASCULAR RIGHT COMMON FEMORAL AUGMENT: NORMAL
BH CV LOWER VASCULAR RIGHT COMMON FEMORAL COMPETENT: NORMAL
BH CV LOWER VASCULAR RIGHT COMMON FEMORAL COMPRESS: NORMAL
BH CV LOWER VASCULAR RIGHT COMMON FEMORAL PHASIC: NORMAL
BH CV LOWER VASCULAR RIGHT COMMON FEMORAL SPONT: NORMAL
BH CV LOWER VASCULAR RIGHT DISTAL FEMORAL COMPRESS: NORMAL
BH CV LOWER VASCULAR RIGHT GASTRONEMIUS COMPRESS: NORMAL
BH CV LOWER VASCULAR RIGHT GREATER SAPH AK COMPRESS: NORMAL
BH CV LOWER VASCULAR RIGHT GREATER SAPH BK COMPRESS: NORMAL
BH CV LOWER VASCULAR RIGHT MID FEMORAL AUGMENT: NORMAL
BH CV LOWER VASCULAR RIGHT MID FEMORAL COMPETENT: NORMAL
BH CV LOWER VASCULAR RIGHT MID FEMORAL COMPRESS: NORMAL
BH CV LOWER VASCULAR RIGHT MID FEMORAL PHASIC: NORMAL
BH CV LOWER VASCULAR RIGHT MID FEMORAL SPONT: NORMAL
BH CV LOWER VASCULAR RIGHT PERONEAL COMPRESS: NORMAL
BH CV LOWER VASCULAR RIGHT POPLITEAL AUGMENT: NORMAL
BH CV LOWER VASCULAR RIGHT POPLITEAL COMPETENT: NORMAL
BH CV LOWER VASCULAR RIGHT POPLITEAL COMPRESS: NORMAL
BH CV LOWER VASCULAR RIGHT POPLITEAL PHASIC: NORMAL
BH CV LOWER VASCULAR RIGHT POPLITEAL SPONT: NORMAL
BH CV LOWER VASCULAR RIGHT POSTERIOR TIBIAL COMPRESS: NORMAL
BH CV LOWER VASCULAR RIGHT POSTERIOR TIBIAL THROMBUS: NORMAL
BH CV LOWER VASCULAR RIGHT POSTERIOR TIBIAL THROMBUS: NORMAL
BH CV LOWER VASCULAR RIGHT PROFUNDA FEMORAL COMPRESS: NORMAL
BH CV LOWER VASCULAR RIGHT PROXIMAL FEMORAL COMPRESS: NORMAL
BH CV LOWER VASCULAR RIGHT SAPHENOFEMORAL JUNCTION COMPRESS: NORMAL
BH CV XLRA - RV BASE: 4 CM
BH CV XLRA - RV LENGTH: 6.3 CM
BH CV XLRA - RV MID: 4.5 CM
BILIRUB SERPL-MCNC: 0.4 MG/DL (ref 0–1.2)
BILIRUB SERPL-MCNC: 0.4 MG/DL (ref 0–1.2)
BILIRUB SERPL-MCNC: 0.5 MG/DL (ref 0–1.2)
BILIRUB SERPL-MCNC: 0.8 MG/DL (ref 0–1.2)
BLD GP AB SCN SERPL QL: NEGATIVE
BUN SERPL-MCNC: 13 MG/DL (ref 8–23)
BUN SERPL-MCNC: 13 MG/DL (ref 8–23)
BUN SERPL-MCNC: 15 MG/DL (ref 8–23)
BUN SERPL-MCNC: 16 MG/DL (ref 8–23)
BUN SERPL-MCNC: 19 MG/DL (ref 8–23)
BUN SERPL-MCNC: 20 MG/DL (ref 8–23)
BUN SERPL-MCNC: 20 MG/DL (ref 8–23)
BUN SERPL-MCNC: 22 MG/DL (ref 8–23)
BUN SERPL-MCNC: 23 MG/DL (ref 8–23)
BUN SERPL-MCNC: 25 MG/DL (ref 8–23)
BUN SERPL-MCNC: 27 MG/DL (ref 8–23)
BUN SERPL-MCNC: 31 MG/DL (ref 8–23)
BUN SERPL-MCNC: 33 MG/DL (ref 8–23)
BUN SERPL-MCNC: 34 MG/DL (ref 8–23)
BUN SERPL-MCNC: 43 MG/DL (ref 8–23)
BUN/CREAT SERPL: 11.3 (ref 7–25)
BUN/CREAT SERPL: 11.6 (ref 7–25)
BUN/CREAT SERPL: 14.3 (ref 7–25)
BUN/CREAT SERPL: 15.2 (ref 7–25)
BUN/CREAT SERPL: 15.8 (ref 7–25)
BUN/CREAT SERPL: 16.4 (ref 7–25)
BUN/CREAT SERPL: 16.5 (ref 7–25)
BUN/CREAT SERPL: 19 (ref 7–25)
BUN/CREAT SERPL: 19.2 (ref 7–25)
BUN/CREAT SERPL: 20.6 (ref 7–25)
BUN/CREAT SERPL: 20.8 (ref 7–25)
BUN/CREAT SERPL: 21.9 (ref 7–25)
BUN/CREAT SERPL: 22.1 (ref 7–25)
BUN/CREAT SERPL: 23 (ref 7–25)
BUN/CREAT SERPL: 34.7 (ref 7–25)
C PNEUM DNA NPH QL NAA+NON-PROBE: NOT DETECTED
CALCIUM SPEC-SCNC: 10.3 MG/DL (ref 8.6–10.5)
CALCIUM SPEC-SCNC: 11 MG/DL (ref 8.6–10.5)
CALCIUM SPEC-SCNC: 6.6 MG/DL (ref 8.6–10.5)
CALCIUM SPEC-SCNC: 7.9 MG/DL (ref 8.6–10.5)
CALCIUM SPEC-SCNC: 8 MG/DL (ref 8.6–10.5)
CALCIUM SPEC-SCNC: 8 MG/DL (ref 8.6–10.5)
CALCIUM SPEC-SCNC: 8.2 MG/DL (ref 8.6–10.5)
CALCIUM SPEC-SCNC: 8.6 MG/DL (ref 8.6–10.5)
CALCIUM SPEC-SCNC: 8.8 MG/DL (ref 8.6–10.5)
CALCIUM SPEC-SCNC: 8.8 MG/DL (ref 8.6–10.5)
CALCIUM SPEC-SCNC: 9.1 MG/DL (ref 8.6–10.5)
CALCIUM SPEC-SCNC: 9.2 MG/DL (ref 8.6–10.5)
CALCIUM SPEC-SCNC: 9.2 MG/DL (ref 8.6–10.5)
CALCIUM SPEC-SCNC: 9.4 MG/DL (ref 8.6–10.5)
CALCIUM SPEC-SCNC: 9.6 MG/DL (ref 8.6–10.5)
CARDIOLIPIN IGG SER IA-ACNC: <9 GPL U/ML (ref 0–14)
CARDIOLIPIN IGM SER IA-ACNC: <9 MPL U/ML (ref 0–12)
CHLORIDE SERPL-SCNC: 100 MMOL/L (ref 98–107)
CHLORIDE SERPL-SCNC: 101 MMOL/L (ref 98–107)
CHLORIDE SERPL-SCNC: 102 MMOL/L (ref 98–107)
CHLORIDE SERPL-SCNC: 103 MMOL/L (ref 98–107)
CHLORIDE SERPL-SCNC: 104 MMOL/L (ref 98–107)
CHLORIDE SERPL-SCNC: 105 MMOL/L (ref 98–107)
CHLORIDE SERPL-SCNC: 106 MMOL/L (ref 98–107)
CHLORIDE SERPL-SCNC: 107 MMOL/L (ref 98–107)
CHLORIDE SERPL-SCNC: 107 MMOL/L (ref 98–107)
CHLORIDE SERPL-SCNC: 109 MMOL/L (ref 98–107)
CHLORIDE SERPL-SCNC: 111 MMOL/L (ref 98–107)
CHLORIDE SERPL-SCNC: 114 MMOL/L (ref 98–107)
CHLORIDE SERPL-SCNC: 120 MMOL/L (ref 98–107)
CO2 SERPL-SCNC: 16.5 MMOL/L (ref 22–29)
CO2 SERPL-SCNC: 19 MMOL/L (ref 22–29)
CO2 SERPL-SCNC: 19.5 MMOL/L (ref 22–29)
CO2 SERPL-SCNC: 19.8 MMOL/L (ref 22–29)
CO2 SERPL-SCNC: 19.8 MMOL/L (ref 22–29)
CO2 SERPL-SCNC: 20.7 MMOL/L (ref 22–29)
CO2 SERPL-SCNC: 21.1 MMOL/L (ref 22–29)
CO2 SERPL-SCNC: 22 MMOL/L (ref 22–29)
CO2 SERPL-SCNC: 22 MMOL/L (ref 22–29)
CO2 SERPL-SCNC: 24.2 MMOL/L (ref 22–29)
CO2 SERPL-SCNC: 24.4 MMOL/L (ref 22–29)
CO2 SERPL-SCNC: 24.4 MMOL/L (ref 22–29)
CO2 SERPL-SCNC: 24.8 MMOL/L (ref 22–29)
CO2 SERPL-SCNC: 25.4 MMOL/L (ref 22–29)
CO2 SERPL-SCNC: 25.8 MMOL/L (ref 22–29)
CREAT SERPL-MCNC: 0.97 MG/DL (ref 0.57–1)
CREAT SERPL-MCNC: 1.01 MG/DL (ref 0.57–1)
CREAT SERPL-MCNC: 1.05 MG/DL (ref 0.57–1)
CREAT SERPL-MCNC: 1.12 MG/DL (ref 0.57–1)
CREAT SERPL-MCNC: 1.13 MG/DL (ref 0.57–1)
CREAT SERPL-MCNC: 1.15 MG/DL (ref 0.57–1)
CREAT SERPL-MCNC: 1.2 MG/DL (ref 0.57–1)
CREAT SERPL-MCNC: 1.21 MG/DL (ref 0.57–1)
CREAT SERPL-MCNC: 1.24 MG/DL (ref 0.57–1)
CREAT SERPL-MCNC: 1.25 MG/DL (ref 0.57–1)
CREAT SERPL-MCNC: 1.3 MG/DL (ref 0.57–1)
CREAT SERPL-MCNC: 1.34 MG/DL (ref 0.57–1)
CREAT SERPL-MCNC: 1.48 MG/DL (ref 0.57–1)
CREAT SERPL-MCNC: 1.51 MG/DL (ref 0.57–1)
CREAT SERPL-MCNC: 1.63 MG/DL (ref 0.57–1)
CYTO UR: NORMAL
DEPRECATED RDW RBC AUTO: 43.9 FL (ref 37–54)
DEPRECATED RDW RBC AUTO: 44 FL (ref 37–54)
DEPRECATED RDW RBC AUTO: 44 FL (ref 37–54)
DEPRECATED RDW RBC AUTO: 44.1 FL (ref 37–54)
DEPRECATED RDW RBC AUTO: 44.5 FL (ref 37–54)
DEPRECATED RDW RBC AUTO: 44.7 FL (ref 37–54)
DEPRECATED RDW RBC AUTO: 44.9 FL (ref 37–54)
DEPRECATED RDW RBC AUTO: 45.4 FL (ref 37–54)
DEPRECATED RDW RBC AUTO: 45.6 FL (ref 37–54)
DEPRECATED RDW RBC AUTO: 45.6 FL (ref 37–54)
DEPRECATED RDW RBC AUTO: 45.8 FL (ref 37–54)
DEPRECATED RDW RBC AUTO: 45.9 FL (ref 37–54)
DEPRECATED RDW RBC AUTO: 46.6 FL (ref 37–54)
DEPRECATED RDW RBC AUTO: 46.7 FL (ref 37–54)
EOSINOPHIL # BLD AUTO: 0 10*3/MM3 (ref 0–0.4)
EOSINOPHIL # BLD AUTO: 0 10*3/MM3 (ref 0–0.4)
EOSINOPHIL # BLD AUTO: 0.04 10*3/MM3 (ref 0–0.4)
EOSINOPHIL # BLD AUTO: 0.06 10*3/MM3 (ref 0–0.4)
EOSINOPHIL # BLD AUTO: 0.07 10*3/MM3 (ref 0–0.4)
EOSINOPHIL # BLD AUTO: 0.08 10*3/MM3 (ref 0–0.4)
EOSINOPHIL # BLD AUTO: 0.08 10*3/MM3 (ref 0–0.4)
EOSINOPHIL # BLD AUTO: 0.11 10*3/MM3 (ref 0–0.4)
EOSINOPHIL # BLD AUTO: 0.15 10*3/MM3 (ref 0–0.4)
EOSINOPHIL # BLD AUTO: 0.25 10*3/MM3 (ref 0–0.4)
EOSINOPHIL NFR BLD AUTO: 0 % (ref 0.3–6.2)
EOSINOPHIL NFR BLD AUTO: 0 % (ref 0.3–6.2)
EOSINOPHIL NFR BLD AUTO: 0.6 % (ref 0.3–6.2)
EOSINOPHIL NFR BLD AUTO: 1 % (ref 0.3–6.2)
EOSINOPHIL NFR BLD AUTO: 1.1 % (ref 0.3–6.2)
EOSINOPHIL NFR BLD AUTO: 1.3 % (ref 0.3–6.2)
EOSINOPHIL NFR BLD AUTO: 1.4 % (ref 0.3–6.2)
EOSINOPHIL NFR BLD AUTO: 1.4 % (ref 0.3–6.2)
EOSINOPHIL NFR BLD AUTO: 2.4 % (ref 0.3–6.2)
EOSINOPHIL NFR BLD AUTO: 2.6 % (ref 0.3–6.2)
ERYTHROCYTE [DISTWIDTH] IN BLOOD BY AUTOMATED COUNT: 12.8 % (ref 12.3–15.4)
ERYTHROCYTE [DISTWIDTH] IN BLOOD BY AUTOMATED COUNT: 12.8 % (ref 12.3–15.4)
ERYTHROCYTE [DISTWIDTH] IN BLOOD BY AUTOMATED COUNT: 12.9 % (ref 12.3–15.4)
ERYTHROCYTE [DISTWIDTH] IN BLOOD BY AUTOMATED COUNT: 13 % (ref 12.3–15.4)
ERYTHROCYTE [DISTWIDTH] IN BLOOD BY AUTOMATED COUNT: 13.1 % (ref 12.3–15.4)
ERYTHROCYTE [DISTWIDTH] IN BLOOD BY AUTOMATED COUNT: 13.2 % (ref 12.3–15.4)
ERYTHROCYTE [DISTWIDTH] IN BLOOD BY AUTOMATED COUNT: 13.2 % (ref 12.3–15.4)
F5 GENE MUT ANL BLD/T: NORMAL
FACTOR II, DNA ANALYSIS: NORMAL
FIBRINOGEN PPP-MCNC: 340 MG/DL (ref 219–464)
FIBRINOGEN PPP-MCNC: 342 MG/DL (ref 219–464)
FLUAV H1 2009 PAND RNA NPH QL NAA+PROBE: NOT DETECTED
FLUAV H1 HA GENE NPH QL NAA+PROBE: NOT DETECTED
FLUAV H3 RNA NPH QL NAA+PROBE: NOT DETECTED
FLUAV SUBTYP SPEC NAA+PROBE: NOT DETECTED
FLUBV RNA ISLT QL NAA+PROBE: NOT DETECTED
GFR SERPL CREATININE-BSD FRML MDRD: 32 ML/MIN/1.73
GFR SERPL CREATININE-BSD FRML MDRD: 35 ML/MIN/1.73
GFR SERPL CREATININE-BSD FRML MDRD: 35 ML/MIN/1.73
GFR SERPL CREATININE-BSD FRML MDRD: 40 ML/MIN/1.73
GFR SERPL CREATININE-BSD FRML MDRD: 41 ML/MIN/1.73
GFR SERPL CREATININE-BSD FRML MDRD: 43 ML/MIN/1.73
GFR SERPL CREATININE-BSD FRML MDRD: 43 ML/MIN/1.73
GFR SERPL CREATININE-BSD FRML MDRD: 45 ML/MIN/1.73
GFR SERPL CREATININE-BSD FRML MDRD: 45 ML/MIN/1.73
GFR SERPL CREATININE-BSD FRML MDRD: 47 ML/MIN/1.73
GFR SERPL CREATININE-BSD FRML MDRD: 48 ML/MIN/1.73
GFR SERPL CREATININE-BSD FRML MDRD: 49 ML/MIN/1.73
GFR SERPL CREATININE-BSD FRML MDRD: 53 ML/MIN/1.73
GFR SERPL CREATININE-BSD FRML MDRD: 55 ML/MIN/1.73
GFR SERPL CREATININE-BSD FRML MDRD: 58 ML/MIN/1.73
GLOBULIN UR ELPH-MCNC: 3 GM/DL
GLOBULIN UR ELPH-MCNC: 3.3 GM/DL
GLOBULIN UR ELPH-MCNC: 3.5 GM/DL
GLOBULIN UR ELPH-MCNC: 3.6 GM/DL
GLOBULIN UR ELPH-MCNC: 3.7 GM/DL
GLOBULIN UR ELPH-MCNC: 3.9 GM/DL
GLOBULIN UR ELPH-MCNC: 4.3 GM/DL
GLUCOSE BLDC GLUCOMTR-MCNC: 107 MG/DL (ref 70–130)
GLUCOSE BLDC GLUCOMTR-MCNC: 108 MG/DL (ref 70–130)
GLUCOSE BLDC GLUCOMTR-MCNC: 109 MG/DL (ref 70–130)
GLUCOSE BLDC GLUCOMTR-MCNC: 109 MG/DL (ref 70–130)
GLUCOSE BLDC GLUCOMTR-MCNC: 111 MG/DL (ref 70–130)
GLUCOSE BLDC GLUCOMTR-MCNC: 113 MG/DL (ref 70–130)
GLUCOSE BLDC GLUCOMTR-MCNC: 114 MG/DL (ref 70–130)
GLUCOSE BLDC GLUCOMTR-MCNC: 115 MG/DL (ref 70–130)
GLUCOSE BLDC GLUCOMTR-MCNC: 117 MG/DL (ref 70–130)
GLUCOSE BLDC GLUCOMTR-MCNC: 117 MG/DL (ref 70–130)
GLUCOSE BLDC GLUCOMTR-MCNC: 118 MG/DL (ref 70–130)
GLUCOSE BLDC GLUCOMTR-MCNC: 123 MG/DL (ref 70–130)
GLUCOSE BLDC GLUCOMTR-MCNC: 123 MG/DL (ref 70–130)
GLUCOSE BLDC GLUCOMTR-MCNC: 126 MG/DL (ref 70–130)
GLUCOSE BLDC GLUCOMTR-MCNC: 128 MG/DL (ref 70–130)
GLUCOSE BLDC GLUCOMTR-MCNC: 130 MG/DL (ref 70–130)
GLUCOSE BLDC GLUCOMTR-MCNC: 134 MG/DL (ref 70–130)
GLUCOSE BLDC GLUCOMTR-MCNC: 140 MG/DL (ref 70–130)
GLUCOSE BLDC GLUCOMTR-MCNC: 141 MG/DL (ref 70–130)
GLUCOSE BLDC GLUCOMTR-MCNC: 141 MG/DL (ref 70–130)
GLUCOSE BLDC GLUCOMTR-MCNC: 154 MG/DL (ref 70–130)
GLUCOSE BLDC GLUCOMTR-MCNC: 161 MG/DL (ref 70–130)
GLUCOSE BLDC GLUCOMTR-MCNC: 177 MG/DL (ref 70–130)
GLUCOSE SERPL-MCNC: 100 MG/DL (ref 65–99)
GLUCOSE SERPL-MCNC: 100 MG/DL (ref 65–99)
GLUCOSE SERPL-MCNC: 101 MG/DL (ref 65–99)
GLUCOSE SERPL-MCNC: 101 MG/DL (ref 65–99)
GLUCOSE SERPL-MCNC: 105 MG/DL (ref 65–99)
GLUCOSE SERPL-MCNC: 107 MG/DL (ref 65–99)
GLUCOSE SERPL-MCNC: 111 MG/DL (ref 65–99)
GLUCOSE SERPL-MCNC: 111 MG/DL (ref 65–99)
GLUCOSE SERPL-MCNC: 128 MG/DL (ref 65–99)
GLUCOSE SERPL-MCNC: 132 MG/DL (ref 65–99)
GLUCOSE SERPL-MCNC: 145 MG/DL (ref 65–99)
GLUCOSE SERPL-MCNC: 169 MG/DL (ref 65–99)
GLUCOSE SERPL-MCNC: 98 MG/DL (ref 65–99)
GRAM STN SPEC: ABNORMAL
HADV DNA SPEC NAA+PROBE: NOT DETECTED
HCO3 BLDA-SCNC: 25.8 MMOL/L (ref 22–28)
HCOV 229E RNA SPEC QL NAA+PROBE: NOT DETECTED
HCOV HKU1 RNA SPEC QL NAA+PROBE: NOT DETECTED
HCOV NL63 RNA SPEC QL NAA+PROBE: NOT DETECTED
HCOV OC43 RNA SPEC QL NAA+PROBE: NOT DETECTED
HCT VFR BLD AUTO: 31.1 % (ref 34–46.6)
HCT VFR BLD AUTO: 31.8 % (ref 34–46.6)
HCT VFR BLD AUTO: 32.6 % (ref 34–46.6)
HCT VFR BLD AUTO: 33.3 % (ref 34–46.6)
HCT VFR BLD AUTO: 33.6 % (ref 34–46.6)
HCT VFR BLD AUTO: 35.4 % (ref 34–46.6)
HCT VFR BLD AUTO: 35.4 % (ref 34–46.6)
HCT VFR BLD AUTO: 35.6 % (ref 34–46.6)
HCT VFR BLD AUTO: 36.3 % (ref 34–46.6)
HCT VFR BLD AUTO: 37 % (ref 34–46.6)
HCT VFR BLD AUTO: 37.9 % (ref 34–46.6)
HCT VFR BLD AUTO: 40.6 % (ref 34–46.6)
HCT VFR BLD AUTO: 42 % (ref 34–46.6)
HCT VFR BLD AUTO: 42.3 % (ref 34–46.6)
HCT VFR BLDA CALC: 39 % (ref 38–51)
HGB BLD-MCNC: 10.4 G/DL (ref 12–15.9)
HGB BLD-MCNC: 10.6 G/DL (ref 12–15.9)
HGB BLD-MCNC: 11.1 G/DL (ref 12–15.9)
HGB BLD-MCNC: 11.2 G/DL (ref 12–15.9)
HGB BLD-MCNC: 11.2 G/DL (ref 12–15.9)
HGB BLD-MCNC: 11.8 G/DL (ref 12–15.9)
HGB BLD-MCNC: 12 G/DL (ref 12–15.9)
HGB BLD-MCNC: 12 G/DL (ref 12–15.9)
HGB BLD-MCNC: 12.3 G/DL (ref 12–15.9)
HGB BLD-MCNC: 12.4 G/DL (ref 12–15.9)
HGB BLD-MCNC: 12.7 G/DL (ref 12–15.9)
HGB BLD-MCNC: 13.4 G/DL (ref 12–15.9)
HGB BLD-MCNC: 13.9 G/DL (ref 12–15.9)
HGB BLD-MCNC: 14.3 G/DL (ref 12–15.9)
HGB BLDA-MCNC: 13.3 G/DL (ref 12–17)
HMPV RNA NPH QL NAA+NON-PROBE: NOT DETECTED
HOLD SPECIMEN: NORMAL
HPIV1 RNA SPEC QL NAA+PROBE: NOT DETECTED
HPIV2 RNA SPEC QL NAA+PROBE: NOT DETECTED
HPIV3 RNA NPH QL NAA+PROBE: NOT DETECTED
HPIV4 P GENE NPH QL NAA+PROBE: NOT DETECTED
IMM GRANULOCYTES # BLD AUTO: 0.01 10*3/MM3 (ref 0–0.05)
IMM GRANULOCYTES # BLD AUTO: 0.02 10*3/MM3 (ref 0–0.05)
IMM GRANULOCYTES # BLD AUTO: 0.03 10*3/MM3 (ref 0–0.05)
IMM GRANULOCYTES # BLD AUTO: 0.04 10*3/MM3 (ref 0–0.05)
IMM GRANULOCYTES # BLD AUTO: 0.05 10*3/MM3 (ref 0–0.05)
IMM GRANULOCYTES # BLD AUTO: 0.07 10*3/MM3 (ref 0–0.05)
IMM GRANULOCYTES # BLD AUTO: 0.1 10*3/MM3 (ref 0–0.05)
IMM GRANULOCYTES # BLD AUTO: 0.35 10*3/MM3 (ref 0–0.05)
IMM GRANULOCYTES NFR BLD AUTO: 0.2 % (ref 0–0.5)
IMM GRANULOCYTES NFR BLD AUTO: 0.3 % (ref 0–0.5)
IMM GRANULOCYTES NFR BLD AUTO: 0.4 % (ref 0–0.5)
IMM GRANULOCYTES NFR BLD AUTO: 0.5 % (ref 0–0.5)
IMM GRANULOCYTES NFR BLD AUTO: 0.5 % (ref 0–0.5)
IMM GRANULOCYTES NFR BLD AUTO: 0.6 % (ref 0–0.5)
IMM GRANULOCYTES NFR BLD AUTO: 0.6 % (ref 0–0.5)
IMM GRANULOCYTES NFR BLD AUTO: 0.7 % (ref 0–0.5)
IMM GRANULOCYTES NFR BLD AUTO: 1 % (ref 0–0.5)
IMM GRANULOCYTES NFR BLD AUTO: 3.7 % (ref 0–0.5)
INHALED O2 CONCENTRATION: 60 %
INR PPP: 0.93 (ref 0.9–1.1)
INR PPP: 0.97 (ref 0.9–1.1)
INR PPP: 0.99 (ref 0.9–1.1)
INR PPP: 1.04 (ref 0.9–1.1)
INR PPP: 1.05 (ref 0.9–1.1)
INR PPP: 1.05 (ref 0.9–1.1)
INR PPP: 1.09 (ref 0.9–1.1)
INR PPP: 1.2 (ref 0.9–1.1)
INR PPP: 8.27 (ref 0.9–1.1)
LA NT DPL PPP: 44 SEC (ref 0–55)
LA NT DPL/LA NT HPL PPP-RTO: 1 RATIO (ref 0–1.4)
LA NT PLATELET PPP: 32.3 SEC (ref 0–51.9)
LAB AP CASE REPORT: NORMAL
LAB AP CLINICAL INFORMATION: NORMAL
LUPUS ANTICOAGULANT REFLEX: ABNORMAL
LYMPHOCYTES # BLD AUTO: 0.51 10*3/MM3 (ref 0.7–3.1)
LYMPHOCYTES # BLD AUTO: 0.59 10*3/MM3 (ref 0.7–3.1)
LYMPHOCYTES # BLD AUTO: 0.73 10*3/MM3 (ref 0.7–3.1)
LYMPHOCYTES # BLD AUTO: 0.94 10*3/MM3 (ref 0.7–3.1)
LYMPHOCYTES # BLD AUTO: 0.98 10*3/MM3 (ref 0.7–3.1)
LYMPHOCYTES # BLD AUTO: 1 10*3/MM3 (ref 0.7–3.1)
LYMPHOCYTES # BLD AUTO: 1.02 10*3/MM3 (ref 0.7–3.1)
LYMPHOCYTES # BLD AUTO: 1.04 10*3/MM3 (ref 0.7–3.1)
LYMPHOCYTES # BLD AUTO: 1.2 10*3/MM3 (ref 0.7–3.1)
LYMPHOCYTES # BLD AUTO: 1.56 10*3/MM3 (ref 0.7–3.1)
LYMPHOCYTES NFR BLD AUTO: 15.4 % (ref 19.6–45.3)
LYMPHOCYTES NFR BLD AUTO: 15.9 % (ref 19.6–45.3)
LYMPHOCYTES NFR BLD AUTO: 15.9 % (ref 19.6–45.3)
LYMPHOCYTES NFR BLD AUTO: 16.2 % (ref 19.6–45.3)
LYMPHOCYTES NFR BLD AUTO: 18.2 % (ref 19.6–45.3)
LYMPHOCYTES NFR BLD AUTO: 18.4 % (ref 19.6–45.3)
LYMPHOCYTES NFR BLD AUTO: 20.7 % (ref 19.6–45.3)
LYMPHOCYTES NFR BLD AUTO: 4.7 % (ref 19.6–45.3)
LYMPHOCYTES NFR BLD AUTO: 6 % (ref 19.6–45.3)
LYMPHOCYTES NFR BLD AUTO: 7.7 % (ref 19.6–45.3)
M PNEUMO IGG SER IA-ACNC: NOT DETECTED
MAGNESIUM SERPL-MCNC: 1.7 MG/DL (ref 1.6–2.4)
MAGNESIUM SERPL-MCNC: 1.8 MG/DL (ref 1.6–2.4)
MAGNESIUM SERPL-MCNC: 1.9 MG/DL (ref 1.6–2.4)
MAGNESIUM SERPL-MCNC: 2.8 MG/DL (ref 1.6–2.4)
MCH RBC QN AUTO: 30.8 PG (ref 26.6–33)
MCH RBC QN AUTO: 30.8 PG (ref 26.6–33)
MCH RBC QN AUTO: 31.4 PG (ref 26.6–33)
MCH RBC QN AUTO: 31.4 PG (ref 26.6–33)
MCH RBC QN AUTO: 31.6 PG (ref 26.6–33)
MCH RBC QN AUTO: 31.8 PG (ref 26.6–33)
MCH RBC QN AUTO: 31.9 PG (ref 26.6–33)
MCH RBC QN AUTO: 32.1 PG (ref 26.6–33)
MCHC RBC AUTO-ENTMCNC: 32.9 G/DL (ref 31.5–35.7)
MCHC RBC AUTO-ENTMCNC: 33 G/DL (ref 31.5–35.7)
MCHC RBC AUTO-ENTMCNC: 33.1 G/DL (ref 31.5–35.7)
MCHC RBC AUTO-ENTMCNC: 33.3 G/DL (ref 31.5–35.7)
MCHC RBC AUTO-ENTMCNC: 33.4 G/DL (ref 31.5–35.7)
MCHC RBC AUTO-ENTMCNC: 33.5 G/DL (ref 31.5–35.7)
MCHC RBC AUTO-ENTMCNC: 33.5 G/DL (ref 31.5–35.7)
MCHC RBC AUTO-ENTMCNC: 33.9 G/DL (ref 31.5–35.7)
MCHC RBC AUTO-ENTMCNC: 34 G/DL (ref 31.5–35.7)
MCHC RBC AUTO-ENTMCNC: 34.4 G/DL (ref 31.5–35.7)
MCV RBC AUTO: 92 FL (ref 79–97)
MCV RBC AUTO: 92.5 FL (ref 79–97)
MCV RBC AUTO: 92.6 FL (ref 79–97)
MCV RBC AUTO: 93.7 FL (ref 79–97)
MCV RBC AUTO: 93.8 FL (ref 79–97)
MCV RBC AUTO: 93.8 FL (ref 79–97)
MCV RBC AUTO: 93.9 FL (ref 79–97)
MCV RBC AUTO: 93.9 FL (ref 79–97)
MCV RBC AUTO: 94.9 FL (ref 79–97)
MCV RBC AUTO: 95.2 FL (ref 79–97)
MCV RBC AUTO: 95.5 FL (ref 79–97)
MCV RBC AUTO: 95.5 FL (ref 79–97)
MCV RBC AUTO: 95.7 FL (ref 79–97)
MCV RBC AUTO: 95.8 FL (ref 79–97)
MODALITY: ABNORMAL
MONOCYTES # BLD AUTO: 0.44 10*3/MM3 (ref 0.1–0.9)
MONOCYTES # BLD AUTO: 0.45 10*3/MM3 (ref 0.1–0.9)
MONOCYTES # BLD AUTO: 0.47 10*3/MM3 (ref 0.1–0.9)
MONOCYTES # BLD AUTO: 0.49 10*3/MM3 (ref 0.1–0.9)
MONOCYTES # BLD AUTO: 0.51 10*3/MM3 (ref 0.1–0.9)
MONOCYTES # BLD AUTO: 0.52 10*3/MM3 (ref 0.1–0.9)
MONOCYTES # BLD AUTO: 0.56 10*3/MM3 (ref 0.1–0.9)
MONOCYTES # BLD AUTO: 0.63 10*3/MM3 (ref 0.1–0.9)
MONOCYTES # BLD AUTO: 0.63 10*3/MM3 (ref 0.1–0.9)
MONOCYTES # BLD AUTO: 0.9 10*3/MM3 (ref 0.1–0.9)
MONOCYTES NFR BLD AUTO: 4.5 % (ref 5–12)
MONOCYTES NFR BLD AUTO: 6.4 % (ref 5–12)
MONOCYTES NFR BLD AUTO: 7.2 % (ref 5–12)
MONOCYTES NFR BLD AUTO: 7.3 % (ref 5–12)
MONOCYTES NFR BLD AUTO: 7.6 % (ref 5–12)
MONOCYTES NFR BLD AUTO: 7.9 % (ref 5–12)
MONOCYTES NFR BLD AUTO: 8.3 % (ref 5–12)
MONOCYTES NFR BLD AUTO: 8.6 % (ref 5–12)
MONOCYTES NFR BLD AUTO: 9.2 % (ref 5–12)
MONOCYTES NFR BLD AUTO: 9.4 % (ref 5–12)
MRSA DNA SPEC QL NAA+PROBE: NORMAL
NEUTROPHILS NFR BLD AUTO: 3.94 10*3/MM3 (ref 1.7–7)
NEUTROPHILS NFR BLD AUTO: 4.02 10*3/MM3 (ref 1.7–7)
NEUTROPHILS NFR BLD AUTO: 4.33 10*3/MM3 (ref 1.7–7)
NEUTROPHILS NFR BLD AUTO: 4.55 10*3/MM3 (ref 1.7–7)
NEUTROPHILS NFR BLD AUTO: 4.64 10*3/MM3 (ref 1.7–7)
NEUTROPHILS NFR BLD AUTO: 5.21 10*3/MM3 (ref 1.7–7)
NEUTROPHILS NFR BLD AUTO: 5.85 10*3/MM3 (ref 1.7–7)
NEUTROPHILS NFR BLD AUTO: 69 % (ref 42.7–76)
NEUTROPHILS NFR BLD AUTO: 7.22 10*3/MM3 (ref 1.7–7)
NEUTROPHILS NFR BLD AUTO: 70 % (ref 42.7–76)
NEUTROPHILS NFR BLD AUTO: 71.9 % (ref 42.7–76)
NEUTROPHILS NFR BLD AUTO: 73.1 % (ref 42.7–76)
NEUTROPHILS NFR BLD AUTO: 73.6 % (ref 42.7–76)
NEUTROPHILS NFR BLD AUTO: 74.9 % (ref 42.7–76)
NEUTROPHILS NFR BLD AUTO: 75 % (ref 42.7–76)
NEUTROPHILS NFR BLD AUTO: 75.8 % (ref 42.7–76)
NEUTROPHILS NFR BLD AUTO: 8.5 10*3/MM3 (ref 1.7–7)
NEUTROPHILS NFR BLD AUTO: 86.4 % (ref 42.7–76)
NEUTROPHILS NFR BLD AUTO: 89.8 % (ref 42.7–76)
NEUTROPHILS NFR BLD AUTO: 9.84 10*3/MM3 (ref 1.7–7)
NRBC BLD AUTO-RTO: 0 /100 WBC (ref 0–0.2)
NRBC BLD AUTO-RTO: 0.1 /100 WBC (ref 0–0.2)
NT-PROBNP SERPL-MCNC: 160.4 PG/ML (ref 0–900)
NT-PROBNP SERPL-MCNC: 296.2 PG/ML (ref 0–900)
NT-PROBNP SERPL-MCNC: 356.7 PG/ML (ref 0–900)
O2 A-A PPRESDIFF RESPIRATORY: 0.3 MMHG
OSMOLALITY SERPL: 300 MOSM/KG (ref 280–301)
OSMOLALITY SERPL: 301 MOSM/KG (ref 280–301)
OSMOLALITY SERPL: 301 MOSM/KG (ref 280–301)
OSMOLALITY SERPL: 303 MOSM/KG (ref 280–301)
OSMOLALITY SERPL: 304 MOSM/KG (ref 280–301)
OSMOLALITY SERPL: 307 MOSM/KG (ref 280–301)
PATH REPORT.FINAL DX SPEC: NORMAL
PATH REPORT.GROSS SPEC: NORMAL
PCO2 BLDA: 36.7 MM HG (ref 35–45)
PEEP RESPIRATORY: 5 CM[H2O]
PH BLDA: 7.46 PH UNITS (ref 7.35–7.45)
PHOSPHATE SERPL-MCNC: 1.4 MG/DL (ref 2.5–4.5)
PHOSPHATE SERPL-MCNC: 2.9 MG/DL (ref 2.5–4.5)
PHOSPHATE SERPL-MCNC: 3.5 MG/DL (ref 2.5–4.5)
PHOSPHATE SERPL-MCNC: 3.8 MG/DL (ref 2.5–4.5)
PLATELET # BLD AUTO: 145 10*3/MM3 (ref 140–450)
PLATELET # BLD AUTO: 151 10*3/MM3 (ref 140–450)
PLATELET # BLD AUTO: 163 10*3/MM3 (ref 140–450)
PLATELET # BLD AUTO: 203 10*3/MM3 (ref 140–450)
PLATELET # BLD AUTO: 218 10*3/MM3 (ref 140–450)
PLATELET # BLD AUTO: 229 10*3/MM3 (ref 140–450)
PLATELET # BLD AUTO: 234 10*3/MM3 (ref 140–450)
PLATELET # BLD AUTO: 239 10*3/MM3 (ref 140–450)
PLATELET # BLD AUTO: 260 10*3/MM3 (ref 140–450)
PLATELET # BLD AUTO: 276 10*3/MM3 (ref 140–450)
PLATELET # BLD AUTO: 295 10*3/MM3 (ref 140–450)
PLATELET # BLD AUTO: 374 10*3/MM3 (ref 140–450)
PLATELET # BLD AUTO: 392 10*3/MM3 (ref 140–450)
PLATELET # BLD AUTO: 443 10*3/MM3 (ref 140–450)
PMV BLD AUTO: 10 FL (ref 6–12)
PMV BLD AUTO: 10 FL (ref 6–12)
PMV BLD AUTO: 10.1 FL (ref 6–12)
PMV BLD AUTO: 10.2 FL (ref 6–12)
PMV BLD AUTO: 10.3 FL (ref 6–12)
PMV BLD AUTO: 10.5 FL (ref 6–12)
PMV BLD AUTO: 10.5 FL (ref 6–12)
PMV BLD AUTO: 9.7 FL (ref 6–12)
PMV BLD AUTO: 9.8 FL (ref 6–12)
PMV BLD AUTO: 9.9 FL (ref 6–12)
PO2 BLDA: 108.7 MM HG (ref 80–100)
POTASSIUM SERPL-SCNC: 3.2 MMOL/L (ref 3.5–5.2)
POTASSIUM SERPL-SCNC: 3.3 MMOL/L (ref 3.5–5.2)
POTASSIUM SERPL-SCNC: 3.6 MMOL/L (ref 3.5–5.2)
POTASSIUM SERPL-SCNC: 3.6 MMOL/L (ref 3.5–5.2)
POTASSIUM SERPL-SCNC: 3.7 MMOL/L (ref 3.5–5.2)
POTASSIUM SERPL-SCNC: 3.8 MMOL/L (ref 3.5–5.2)
POTASSIUM SERPL-SCNC: 3.9 MMOL/L (ref 3.5–5.2)
POTASSIUM SERPL-SCNC: 4 MMOL/L (ref 3.5–5.2)
POTASSIUM SERPL-SCNC: 4 MMOL/L (ref 3.5–5.2)
POTASSIUM SERPL-SCNC: 4.1 MMOL/L (ref 3.5–5.2)
POTASSIUM SERPL-SCNC: 4.2 MMOL/L (ref 3.5–5.2)
POTASSIUM SERPL-SCNC: 4.4 MMOL/L (ref 3.5–5.2)
POTASSIUM SERPL-SCNC: 4.6 MMOL/L (ref 3.5–5.2)
PROCALCITONIN SERPL-MCNC: 0.28 NG/ML (ref 0–0.25)
PROT C ACT/NOR PPP: 200 % (ref 86–163)
PROT C AG PPP IA-ACNC: 104 % (ref 60–150)
PROT S ACT/NOR PPP: 128 % (ref 70–127)
PROT S FREE PPP-ACNC: 138 % (ref 49–138)
PROT S PPP-ACNC: 86 % (ref 60–150)
PROT SERPL-MCNC: 5.3 G/DL (ref 6–8.5)
PROT SERPL-MCNC: 6.2 G/DL (ref 6–8.5)
PROT SERPL-MCNC: 6.7 G/DL (ref 6–8.5)
PROT SERPL-MCNC: 7.3 G/DL (ref 6–8.5)
PROT SERPL-MCNC: 7.4 G/DL (ref 6–8.5)
PROT SERPL-MCNC: 7.7 G/DL (ref 6–8.5)
PROT SERPL-MCNC: 8.2 G/DL (ref 6–8.5)
PROTHROMBIN TIME: 12.4 SECONDS (ref 11.7–14.2)
PROTHROMBIN TIME: 12.8 SECONDS (ref 11.7–14.2)
PROTHROMBIN TIME: 13 SECONDS (ref 11.7–14.2)
PROTHROMBIN TIME: 13.5 SECONDS (ref 11.7–14.2)
PROTHROMBIN TIME: 13.6 SECONDS (ref 11.7–14.2)
PROTHROMBIN TIME: 13.6 SECONDS (ref 11.7–14.2)
PROTHROMBIN TIME: 14 SECONDS (ref 11.7–14.2)
PROTHROMBIN TIME: 15.1 SECONDS (ref 11.7–14.2)
PROTHROMBIN TIME: 64.9 SECONDS (ref 11.7–14.2)
RBC # BLD AUTO: 3.35 10*6/MM3 (ref 3.77–5.28)
RBC # BLD AUTO: 3.38 10*6/MM3 (ref 3.77–5.28)
RBC # BLD AUTO: 3.52 10*6/MM3 (ref 3.77–5.28)
RBC # BLD AUTO: 3.52 10*6/MM3 (ref 3.77–5.28)
RBC # BLD AUTO: 3.6 10*6/MM3 (ref 3.77–5.28)
RBC # BLD AUTO: 3.74 10*6/MM3 (ref 3.77–5.28)
RBC # BLD AUTO: 3.77 10*6/MM3 (ref 3.77–5.28)
RBC # BLD AUTO: 3.77 10*6/MM3 (ref 3.77–5.28)
RBC # BLD AUTO: 3.87 10*6/MM3 (ref 3.77–5.28)
RBC # BLD AUTO: 3.95 10*6/MM3 (ref 3.77–5.28)
RBC # BLD AUTO: 3.96 10*6/MM3 (ref 3.77–5.28)
RBC # BLD AUTO: 4.24 10*6/MM3 (ref 3.77–5.28)
RBC # BLD AUTO: 4.43 10*6/MM3 (ref 3.77–5.28)
RBC # BLD AUTO: 4.48 10*6/MM3 (ref 3.77–5.28)
RH BLD: POSITIVE
RHINOVIRUS RNA SPEC NAA+PROBE: NOT DETECTED
RSV RNA NPH QL NAA+NON-PROBE: NOT DETECTED
SAO2 % BLDA: 69 % (ref 95–98)
SAO2 % BLDCOA: 98.5 % (ref 92–99)
SARS-COV-2 RNA PNL SPEC NAA+PROBE: NOT DETECTED
SARS-COV-2 RNA RESP QL NAA+PROBE: NOT DETECTED
SCREEN DRVVT: 45.3 SEC (ref 0–47)
SET MECH RESP RATE: 18
SODIUM SERPL-SCNC: 133 MMOL/L (ref 136–145)
SODIUM SERPL-SCNC: 134 MMOL/L (ref 136–145)
SODIUM SERPL-SCNC: 136 MMOL/L (ref 136–145)
SODIUM SERPL-SCNC: 138 MMOL/L (ref 136–145)
SODIUM SERPL-SCNC: 138 MMOL/L (ref 136–145)
SODIUM SERPL-SCNC: 139 MMOL/L (ref 136–145)
SODIUM SERPL-SCNC: 140 MMOL/L (ref 136–145)
SODIUM SERPL-SCNC: 141 MMOL/L (ref 136–145)
SODIUM SERPL-SCNC: 142 MMOL/L (ref 136–145)
SODIUM SERPL-SCNC: 142 MMOL/L (ref 136–145)
SODIUM SERPL-SCNC: 143 MMOL/L (ref 136–145)
T&S EXPIRATION DATE: NORMAL
THROMBIN NEUTRALIZATION: 25.8 SEC (ref 0–23)
THROMBIN TIME MIX: 140.5 SEC (ref 0–23)
THROMBIN TIME: 111.6 SEC (ref 0–23)
TOTAL RATE: 19 BREATHS/MINUTE
TRIGL SERPL-MCNC: 71 MG/DL (ref 0–150)
TROPONIN T SERPL-MCNC: 0.07 NG/ML (ref 0–0.03)
TROPONIN T SERPL-MCNC: <0.01 NG/ML (ref 0–0.03)
VENTILATOR MODE: AC
VT ON VENT VENT: 450 ML
WBC # BLD AUTO: 10.02 10*3/MM3 (ref 3.4–10.8)
WBC # BLD AUTO: 10.95 10*3/MM3 (ref 3.4–10.8)
WBC # BLD AUTO: 12.36 10*3/MM3 (ref 3.4–10.8)
WBC # BLD AUTO: 5.59 10*3/MM3 (ref 3.4–10.8)
WBC # BLD AUTO: 5.64 10*3/MM3 (ref 3.4–10.8)
WBC # BLD AUTO: 5.92 10*3/MM3 (ref 3.4–10.8)
WBC # BLD AUTO: 6.18 10*3/MM3 (ref 3.4–10.8)
WBC # BLD AUTO: 6.19 10*3/MM3 (ref 3.4–10.8)
WBC # BLD AUTO: 7.55 10*3/MM3 (ref 3.4–10.8)
WBC # BLD AUTO: 7.81 10*3/MM3 (ref 3.4–10.8)
WBC # BLD AUTO: 9 10*3/MM3 (ref 3.4–10.8)
WBC # BLD AUTO: 9.53 10*3/MM3 (ref 3.4–10.8)
WBC # BLD AUTO: 9.84 10*3/MM3 (ref 3.4–10.8)
WBC # BLD AUTO: 9.95 10*3/MM3 (ref 3.4–10.8)
WHOLE BLOOD HOLD SPECIMEN: NORMAL

## 2020-01-01 PROCEDURE — 83735 ASSAY OF MAGNESIUM: CPT | Performed by: INTERNAL MEDICINE

## 2020-01-01 PROCEDURE — 94799 UNLISTED PULMONARY SVC/PX: CPT

## 2020-01-01 PROCEDURE — 99232 SBSQ HOSP IP/OBS MODERATE 35: CPT | Performed by: NURSE PRACTITIONER

## 2020-01-01 PROCEDURE — 25010000002 ONDANSETRON PER 1 MG: Performed by: EMERGENCY MEDICINE

## 2020-01-01 PROCEDURE — 84478 ASSAY OF TRIGLYCERIDES: CPT | Performed by: INTERNAL MEDICINE

## 2020-01-01 PROCEDURE — 25010000003 MAGNESIUM SULFATE 4 GM/100ML SOLUTION: Performed by: INTERNAL MEDICINE

## 2020-01-01 PROCEDURE — 02HQ33Z INSERTION OF INFUSION DEVICE INTO RIGHT PULMONARY ARTERY, PERCUTANEOUS APPROACH: ICD-10-PCS | Performed by: INTERNAL MEDICINE

## 2020-01-01 PROCEDURE — 25010000002 HYDROMORPHONE 1 MG/ML SOLUTION: Performed by: EMERGENCY MEDICINE

## 2020-01-01 PROCEDURE — 85303 CLOT INHIBIT PROT C ACTIVITY: CPT | Performed by: INTERNAL MEDICINE

## 2020-01-01 PROCEDURE — 99222 1ST HOSP IP/OBS MODERATE 55: CPT | Performed by: NURSE PRACTITIONER

## 2020-01-01 PROCEDURE — 84484 ASSAY OF TROPONIN QUANT: CPT | Performed by: PHYSICIAN ASSISTANT

## 2020-01-01 PROCEDURE — 93005 ELECTROCARDIOGRAM TRACING: CPT

## 2020-01-01 PROCEDURE — 99223 1ST HOSP IP/OBS HIGH 75: CPT | Performed by: PSYCHIATRY & NEUROLOGY

## 2020-01-01 PROCEDURE — 80053 COMPREHEN METABOLIC PANEL: CPT | Performed by: INTERNAL MEDICINE

## 2020-01-01 PROCEDURE — 85730 THROMBOPLASTIN TIME PARTIAL: CPT | Performed by: INTERNAL MEDICINE

## 2020-01-01 PROCEDURE — C9132 KCENTRA, PER I.U.: HCPCS | Performed by: EMERGENCY MEDICINE

## 2020-01-01 PROCEDURE — 83930 ASSAY OF BLOOD OSMOLALITY: CPT | Performed by: NEUROLOGICAL SURGERY

## 2020-01-01 PROCEDURE — 80048 BASIC METABOLIC PNL TOTAL CA: CPT | Performed by: INTERNAL MEDICINE

## 2020-01-01 PROCEDURE — 85305 CLOT INHIBIT PROT S TOTAL: CPT | Performed by: INTERNAL MEDICINE

## 2020-01-01 PROCEDURE — 99231 SBSQ HOSP IP/OBS SF/LOW 25: CPT | Performed by: INTERNAL MEDICINE

## 2020-01-01 PROCEDURE — 99153 MOD SED SAME PHYS/QHP EA: CPT | Performed by: INTERNAL MEDICINE

## 2020-01-01 PROCEDURE — 84145 PROCALCITONIN (PCT): CPT | Performed by: INTERNAL MEDICINE

## 2020-01-01 PROCEDURE — 25010000003 LEVETIRACETAM IN NACL 0.75% 1000 MG/100ML SOLUTION: Performed by: PSYCHIATRY & NEUROLOGY

## 2020-01-01 PROCEDURE — 80069 RENAL FUNCTION PANEL: CPT | Performed by: HOSPITALIST

## 2020-01-01 PROCEDURE — 82962 GLUCOSE BLOOD TEST: CPT

## 2020-01-01 PROCEDURE — 84100 ASSAY OF PHOSPHORUS: CPT | Performed by: INTERNAL MEDICINE

## 2020-01-01 PROCEDURE — 84132 ASSAY OF SERUM POTASSIUM: CPT | Performed by: INTERNAL MEDICINE

## 2020-01-01 PROCEDURE — 0202U NFCT DS 22 TRGT SARS-COV-2: CPT | Performed by: EMERGENCY MEDICINE

## 2020-01-01 PROCEDURE — 99232 SBSQ HOSP IP/OBS MODERATE 35: CPT | Performed by: PSYCHIATRY & NEUROLOGY

## 2020-01-01 PROCEDURE — 71275 CT ANGIOGRAPHY CHEST: CPT

## 2020-01-01 PROCEDURE — 0BH17EZ INSERTION OF ENDOTRACHEAL AIRWAY INTO TRACHEA, VIA NATURAL OR ARTIFICIAL OPENING: ICD-10-PCS | Performed by: EMERGENCY MEDICINE

## 2020-01-01 PROCEDURE — 85730 THROMBOPLASTIN TIME PARTIAL: CPT | Performed by: HOSPITALIST

## 2020-01-01 PROCEDURE — 87641 MR-STAPH DNA AMP PROBE: CPT | Performed by: INTERNAL MEDICINE

## 2020-01-01 PROCEDURE — 25010000002 MIDAZOLAM PER 1 MG: Performed by: INTERNAL MEDICINE

## 2020-01-01 PROCEDURE — 70450 CT HEAD/BRAIN W/O DYE: CPT

## 2020-01-01 PROCEDURE — 85025 COMPLETE CBC W/AUTO DIFF WBC: CPT | Performed by: INTERNAL MEDICINE

## 2020-01-01 PROCEDURE — C1769 GUIDE WIRE: HCPCS | Performed by: INTERNAL MEDICINE

## 2020-01-01 PROCEDURE — 85610 PROTHROMBIN TIME: CPT | Performed by: INTERNAL MEDICINE

## 2020-01-01 PROCEDURE — 74018 RADEX ABDOMEN 1 VIEW: CPT

## 2020-01-01 PROCEDURE — 83520 IMMUNOASSAY QUANT NOS NONAB: CPT | Performed by: INTERNAL MEDICINE

## 2020-01-01 PROCEDURE — 25010000002 FENTANYL CITRATE (PF) 100 MCG/2ML SOLUTION: Performed by: INTERNAL MEDICINE

## 2020-01-01 PROCEDURE — 45380 COLONOSCOPY AND BIOPSY: CPT | Performed by: INTERNAL MEDICINE

## 2020-01-01 PROCEDURE — 88305 TISSUE EXAM BY PATHOLOGIST: CPT | Performed by: INTERNAL MEDICINE

## 2020-01-01 PROCEDURE — 45385 COLONOSCOPY W/LESION REMOVAL: CPT | Performed by: INTERNAL MEDICINE

## 2020-01-01 PROCEDURE — G0378 HOSPITAL OBSERVATION PER HR: HCPCS

## 2020-01-01 PROCEDURE — 85025 COMPLETE CBC W/AUTO DIFF WBC: CPT | Performed by: PHYSICIAN ASSISTANT

## 2020-01-01 PROCEDURE — 25010000002 FUROSEMIDE PER 20 MG: Performed by: INTERNAL MEDICINE

## 2020-01-01 PROCEDURE — 25010000002 LORAZEPAM PER 2 MG

## 2020-01-01 PROCEDURE — 85018 HEMOGLOBIN: CPT

## 2020-01-01 PROCEDURE — 93010 ELECTROCARDIOGRAM REPORT: CPT | Performed by: INTERNAL MEDICINE

## 2020-01-01 PROCEDURE — 25010000002 PROPOFOL 10 MG/ML EMULSION: Performed by: EMERGENCY MEDICINE

## 2020-01-01 PROCEDURE — 87070 CULTURE OTHR SPECIMN AEROBIC: CPT | Performed by: INTERNAL MEDICINE

## 2020-01-01 PROCEDURE — 99239 HOSP IP/OBS DSCHRG MGMT >30: CPT | Performed by: INTERNAL MEDICINE

## 2020-01-01 PROCEDURE — 94760 N-INVAS EAR/PLS OXIMETRY 1: CPT

## 2020-01-01 PROCEDURE — 99223 1ST HOSP IP/OBS HIGH 75: CPT | Performed by: NURSE PRACTITIONER

## 2020-01-01 PROCEDURE — 85732 THROMBOPLASTIN TIME PARTIAL: CPT | Performed by: INTERNAL MEDICINE

## 2020-01-01 PROCEDURE — 93306 TTE W/DOPPLER COMPLETE: CPT

## 2020-01-01 PROCEDURE — 25010000002 ALTEPLASE PER 1 MG: Performed by: INTERNAL MEDICINE

## 2020-01-01 PROCEDURE — 94003 VENT MGMT INPAT SUBQ DAY: CPT

## 2020-01-01 PROCEDURE — 81240 F2 GENE: CPT | Performed by: INTERNAL MEDICINE

## 2020-01-01 PROCEDURE — 99232 SBSQ HOSP IP/OBS MODERATE 35: CPT | Performed by: NEUROLOGICAL SURGERY

## 2020-01-01 PROCEDURE — C1751 CATH, INF, PER/CENT/MIDLINE: HCPCS | Performed by: INTERNAL MEDICINE

## 2020-01-01 PROCEDURE — 25010000002 ONDANSETRON PER 1 MG: Performed by: HOSPITALIST

## 2020-01-01 PROCEDURE — 25010000003 PHYTONADIONE 10 MG/ML SOLUTION 1 ML AMPULE: Performed by: EMERGENCY MEDICINE

## 2020-01-01 PROCEDURE — 83930 ASSAY OF BLOOD OSMOLALITY: CPT | Performed by: PSYCHIATRY & NEUROLOGY

## 2020-01-01 PROCEDURE — 86146 BETA-2 GLYCOPROTEIN ANTIBODY: CPT | Performed by: INTERNAL MEDICINE

## 2020-01-01 PROCEDURE — 71045 X-RAY EXAM CHEST 1 VIEW: CPT

## 2020-01-01 PROCEDURE — 25010000002 LORAZEPAM PER 2 MG: Performed by: INTERNAL MEDICINE

## 2020-01-01 PROCEDURE — 85610 PROTHROMBIN TIME: CPT | Performed by: EMERGENCY MEDICINE

## 2020-01-01 PROCEDURE — 83880 ASSAY OF NATRIURETIC PEPTIDE: CPT | Performed by: INTERNAL MEDICINE

## 2020-01-01 PROCEDURE — 93970 EXTREMITY STUDY: CPT

## 2020-01-01 PROCEDURE — 83880 ASSAY OF NATRIURETIC PEPTIDE: CPT | Performed by: PHYSICIAN ASSISTANT

## 2020-01-01 PROCEDURE — 85025 COMPLETE CBC W/AUTO DIFF WBC: CPT | Performed by: HOSPITALIST

## 2020-01-01 PROCEDURE — 25010000003 AMPICILLIN-SULBACTAM PER 1.5 G: Performed by: INTERNAL MEDICINE

## 2020-01-01 PROCEDURE — 85670 THROMBIN TIME PLASMA: CPT | Performed by: INTERNAL MEDICINE

## 2020-01-01 PROCEDURE — 99152 MOD SED SAME PHYS/QHP 5/>YRS: CPT | Performed by: INTERNAL MEDICINE

## 2020-01-01 PROCEDURE — 85730 THROMBOPLASTIN TIME PARTIAL: CPT | Performed by: EMERGENCY MEDICINE

## 2020-01-01 PROCEDURE — 0 IOPAMIDOL PER 1 ML: Performed by: INTERNAL MEDICINE

## 2020-01-01 PROCEDURE — 85027 COMPLETE CBC AUTOMATED: CPT | Performed by: INTERNAL MEDICINE

## 2020-01-01 PROCEDURE — 87147 CULTURE TYPE IMMUNOLOGIC: CPT | Performed by: INTERNAL MEDICINE

## 2020-01-01 PROCEDURE — 25010000002 LEVETRIRACETAM PER 10 MG: Performed by: PSYCHIATRY & NEUROLOGY

## 2020-01-01 PROCEDURE — 85014 HEMATOCRIT: CPT

## 2020-01-01 PROCEDURE — 99284 EMERGENCY DEPT VISIT MOD MDM: CPT

## 2020-01-01 PROCEDURE — 36415 COLL VENOUS BLD VENIPUNCTURE: CPT | Performed by: INTERNAL MEDICINE

## 2020-01-01 PROCEDURE — 5A1955Z RESPIRATORY VENTILATION, GREATER THAN 96 CONSECUTIVE HOURS: ICD-10-PCS | Performed by: EMERGENCY MEDICINE

## 2020-01-01 PROCEDURE — 25010000002 MORPHINE PER 10 MG: Performed by: INTERNAL MEDICINE

## 2020-01-01 PROCEDURE — 83735 ASSAY OF MAGNESIUM: CPT | Performed by: HOSPITALIST

## 2020-01-01 PROCEDURE — 85384 FIBRINOGEN ACTIVITY: CPT | Performed by: INTERNAL MEDICINE

## 2020-01-01 PROCEDURE — 99232 SBSQ HOSP IP/OBS MODERATE 35: CPT | Performed by: INTERNAL MEDICINE

## 2020-01-01 PROCEDURE — 85610 PROTHROMBIN TIME: CPT | Performed by: PHYSICIAN ASSISTANT

## 2020-01-01 PROCEDURE — 82565 ASSAY OF CREATININE: CPT

## 2020-01-01 PROCEDURE — 99204 OFFICE O/P NEW MOD 45 MIN: CPT | Performed by: INTERNAL MEDICINE

## 2020-01-01 PROCEDURE — 93005 ELECTROCARDIOGRAM TRACING: CPT | Performed by: INTERNAL MEDICINE

## 2020-01-01 PROCEDURE — 83930 ASSAY OF BLOOD OSMOLALITY: CPT | Performed by: INTERNAL MEDICINE

## 2020-01-01 PROCEDURE — 85302 CLOT INHIBIT PROT C ANTIGEN: CPT | Performed by: INTERNAL MEDICINE

## 2020-01-01 PROCEDURE — 0 DIATRIZOATE MEGLUMINE & SODIUM PER 1 ML: Performed by: INTERNAL MEDICINE

## 2020-01-01 PROCEDURE — 85300 ANTITHROMBIN III ACTIVITY: CPT | Performed by: INTERNAL MEDICINE

## 2020-01-01 PROCEDURE — 95816 EEG AWAKE AND DROWSY: CPT | Performed by: PSYCHIATRY & NEUROLOGY

## 2020-01-01 PROCEDURE — B2141ZZ FLUOROSCOPY OF RIGHT HEART USING LOW OSMOLAR CONTRAST: ICD-10-PCS | Performed by: INTERNAL MEDICINE

## 2020-01-01 PROCEDURE — 80053 COMPREHEN METABOLIC PANEL: CPT | Performed by: PHYSICIAN ASSISTANT

## 2020-01-01 PROCEDURE — 93005 ELECTROCARDIOGRAM TRACING: CPT | Performed by: EMERGENCY MEDICINE

## 2020-01-01 PROCEDURE — 99291 CRITICAL CARE FIRST HOUR: CPT

## 2020-01-01 PROCEDURE — 86850 RBC ANTIBODY SCREEN: CPT | Performed by: EMERGENCY MEDICINE

## 2020-01-01 PROCEDURE — 99222 1ST HOSP IP/OBS MODERATE 55: CPT | Performed by: INTERNAL MEDICINE

## 2020-01-01 PROCEDURE — 25010000002 ENOXAPARIN PER 10 MG: Performed by: INTERNAL MEDICINE

## 2020-01-01 PROCEDURE — 85306 CLOT INHIBIT PROT S FREE: CPT | Performed by: INTERNAL MEDICINE

## 2020-01-01 PROCEDURE — 85730 THROMBOPLASTIN TIME PARTIAL: CPT | Performed by: PHYSICIAN ASSISTANT

## 2020-01-01 PROCEDURE — 81241 F5 GENE: CPT | Performed by: INTERNAL MEDICINE

## 2020-01-01 PROCEDURE — 87635 SARS-COV-2 COVID-19 AMP PRB: CPT | Performed by: EMERGENCY MEDICINE

## 2020-01-01 PROCEDURE — 25010000003 POTASSIUM CHLORIDE 10 MEQ/100ML SOLUTION: Performed by: INTERNAL MEDICINE

## 2020-01-01 PROCEDURE — 82803 BLOOD GASES ANY COMBINATION: CPT

## 2020-01-01 PROCEDURE — 85025 COMPLETE CBC W/AUTO DIFF WBC: CPT | Performed by: EMERGENCY MEDICINE

## 2020-01-01 PROCEDURE — 0 IOPAMIDOL PER 1 ML: Performed by: EMERGENCY MEDICINE

## 2020-01-01 PROCEDURE — 0 TECHNETIUM SULFUR COLLOID: Performed by: FAMILY MEDICINE

## 2020-01-01 PROCEDURE — 63710000001 INSULIN REGULAR HUMAN PER 5 UNITS: Performed by: INTERNAL MEDICINE

## 2020-01-01 PROCEDURE — 3E06317 INTRODUCTION OF OTHER THROMBOLYTIC INTO CENTRAL ARTERY, PERCUTANEOUS APPROACH: ICD-10-PCS | Performed by: INTERNAL MEDICINE

## 2020-01-01 PROCEDURE — 74176 CT ABD & PELVIS W/O CONTRAST: CPT

## 2020-01-01 PROCEDURE — 86147 CARDIOLIPIN ANTIBODY EA IG: CPT | Performed by: INTERNAL MEDICINE

## 2020-01-01 PROCEDURE — 87205 SMEAR GRAM STAIN: CPT | Performed by: INTERNAL MEDICINE

## 2020-01-01 PROCEDURE — 43239 EGD BIOPSY SINGLE/MULTIPLE: CPT | Performed by: INTERNAL MEDICINE

## 2020-01-01 PROCEDURE — C1751 CATH, INF, PER/CENT/MIDLINE: HCPCS

## 2020-01-01 PROCEDURE — 25010000002 HEPARIN (PORCINE) PER 1000 UNITS: Performed by: INTERNAL MEDICINE

## 2020-01-01 PROCEDURE — A9541 TC99M SULFUR COLLOID: HCPCS | Performed by: FAMILY MEDICINE

## 2020-01-01 PROCEDURE — 31500 INSERT EMERGENCY AIRWAY: CPT

## 2020-01-01 PROCEDURE — 83930 ASSAY OF BLOOD OSMOLALITY: CPT | Performed by: NURSE PRACTITIONER

## 2020-01-01 PROCEDURE — 93451 RIGHT HEART CATH: CPT | Performed by: INTERNAL MEDICINE

## 2020-01-01 PROCEDURE — 25010000003 LEVETIRACETAM IN NACL 0.75% 1000 MG/100ML SOLUTION: Performed by: NURSE PRACTITIONER

## 2020-01-01 PROCEDURE — 84484 ASSAY OF TROPONIN QUANT: CPT | Performed by: EMERGENCY MEDICINE

## 2020-01-01 PROCEDURE — 87186 SC STD MICRODIL/AGAR DIL: CPT | Performed by: INTERNAL MEDICINE

## 2020-01-01 PROCEDURE — 80053 COMPREHEN METABOLIC PANEL: CPT | Performed by: EMERGENCY MEDICINE

## 2020-01-01 PROCEDURE — 4A023N6 MEASUREMENT OF CARDIAC SAMPLING AND PRESSURE, RIGHT HEART, PERCUTANEOUS APPROACH: ICD-10-PCS | Performed by: INTERNAL MEDICINE

## 2020-01-01 PROCEDURE — 37211 THROMBOLYTIC ART THERAPY: CPT | Performed by: INTERNAL MEDICINE

## 2020-01-01 PROCEDURE — 86901 BLOOD TYPING SEROLOGIC RH(D): CPT | Performed by: EMERGENCY MEDICINE

## 2020-01-01 PROCEDURE — 02HR33Z INSERTION OF INFUSION DEVICE INTO LEFT PULMONARY ARTERY, PERCUTANEOUS APPROACH: ICD-10-PCS | Performed by: INTERNAL MEDICINE

## 2020-01-01 PROCEDURE — 94002 VENT MGMT INPAT INIT DAY: CPT

## 2020-01-01 PROCEDURE — 36415 COLL VENOUS BLD VENIPUNCTURE: CPT | Performed by: HOSPITALIST

## 2020-01-01 PROCEDURE — 36600 WITHDRAWAL OF ARTERIAL BLOOD: CPT

## 2020-01-01 PROCEDURE — 25010000002 HEPARIN (PORCINE) 25000-0.45 UT/250ML-% SOLUTION: Performed by: HOSPITALIST

## 2020-01-01 PROCEDURE — 86900 BLOOD TYPING SEROLOGIC ABO: CPT | Performed by: EMERGENCY MEDICINE

## 2020-01-01 PROCEDURE — 95816 EEG AWAKE AND DROWSY: CPT

## 2020-01-01 PROCEDURE — C1894 INTRO/SHEATH, NON-LASER: HCPCS | Performed by: INTERNAL MEDICINE

## 2020-01-01 PROCEDURE — 25010000002 PROTHROMBIN COMPLEX CONC HUMAN 500 UNITS KIT: Performed by: EMERGENCY MEDICINE

## 2020-01-01 PROCEDURE — 25010000002 HEPARIN (PORCINE) PER 1000 UNITS: Performed by: PHYSICIAN ASSISTANT

## 2020-01-01 PROCEDURE — 85705 THROMBOPLASTIN INHIBITION: CPT | Performed by: INTERNAL MEDICINE

## 2020-01-01 PROCEDURE — 99231 SBSQ HOSP IP/OBS SF/LOW 25: CPT | Performed by: NURSE PRACTITIONER

## 2020-01-01 PROCEDURE — 25010000002 PROTHROMBIN COMPLEX CONC HUMAN 1000 UNITS KIT: Performed by: EMERGENCY MEDICINE

## 2020-01-01 PROCEDURE — 25010000002 HYDROMORPHONE 1 MG/ML SOLUTION: Performed by: INTERNAL MEDICINE

## 2020-01-01 PROCEDURE — 78264 GASTRIC EMPTYING IMG STUDY: CPT

## 2020-01-01 PROCEDURE — 93306 TTE W/DOPPLER COMPLETE: CPT | Performed by: INTERNAL MEDICINE

## 2020-01-01 PROCEDURE — 85613 RUSSELL VIPER VENOM DILUTED: CPT | Performed by: INTERNAL MEDICINE

## 2020-01-01 RX ORDER — FAMOTIDINE 10 MG/ML
20 INJECTION, SOLUTION INTRAVENOUS EVERY 12 HOURS SCHEDULED
Status: DISCONTINUED | OUTPATIENT
Start: 2020-01-01 | End: 2020-01-01

## 2020-01-01 RX ORDER — AMLODIPINE BESYLATE 10 MG/1
10 TABLET ORAL NIGHTLY
Status: DISCONTINUED | OUTPATIENT
Start: 2020-01-01 | End: 2020-01-01 | Stop reason: HOSPADM

## 2020-01-01 RX ORDER — PREGABALIN 75 MG/1
75 CAPSULE ORAL 2 TIMES DAILY
COMMUNITY
End: 2020-01-01

## 2020-01-01 RX ORDER — HEPARIN SODIUM 10000 [USP'U]/100ML
400 INJECTION, SOLUTION INTRAVENOUS CONTINUOUS
Status: DISCONTINUED | OUTPATIENT
Start: 2020-01-01 | End: 2020-01-01

## 2020-01-01 RX ORDER — ONDANSETRON 2 MG/ML
4 INJECTION INTRAMUSCULAR; INTRAVENOUS EVERY 6 HOURS PRN
Status: DISCONTINUED | OUTPATIENT
Start: 2020-01-01 | End: 2020-01-01 | Stop reason: HOSPADM

## 2020-01-01 RX ORDER — AMOXICILLIN AND CLAVULANATE POTASSIUM 250; 62.5 MG/5ML; MG/5ML
500 POWDER, FOR SUSPENSION ORAL EVERY 8 HOURS SCHEDULED
Status: DISCONTINUED | OUTPATIENT
Start: 2020-01-01 | End: 2020-01-01

## 2020-01-01 RX ORDER — AMOXICILLIN 250 MG
2 CAPSULE ORAL 2 TIMES DAILY PRN
Status: DISCONTINUED | OUTPATIENT
Start: 2020-01-01 | End: 2020-08-20 | Stop reason: HOSPADM

## 2020-01-01 RX ORDER — WARFARIN SODIUM 5 MG/1
5 TABLET ORAL
Status: DISCONTINUED | OUTPATIENT
Start: 2020-01-01 | End: 2020-01-01 | Stop reason: HOSPADM

## 2020-01-01 RX ORDER — LABETALOL HYDROCHLORIDE 5 MG/ML
20 INJECTION, SOLUTION INTRAVENOUS ONCE
Status: COMPLETED | OUTPATIENT
Start: 2020-01-01 | End: 2020-01-01

## 2020-01-01 RX ORDER — POTASSIUM CHLORIDE 1.5 G/1.77G
40 POWDER, FOR SOLUTION ORAL ONCE
Status: COMPLETED | OUTPATIENT
Start: 2020-01-01 | End: 2020-01-01

## 2020-01-01 RX ORDER — HEPARIN SODIUM 5000 [USP'U]/ML
80 INJECTION, SOLUTION INTRAVENOUS; SUBCUTANEOUS ONCE
Status: COMPLETED | OUTPATIENT
Start: 2020-01-01 | End: 2020-01-01

## 2020-01-01 RX ORDER — LEVETIRACETAM 10 MG/ML
1000 INJECTION INTRAVASCULAR EVERY 6 HOURS
Status: DISCONTINUED | OUTPATIENT
Start: 2020-01-01 | End: 2020-01-01

## 2020-01-01 RX ORDER — WARFARIN SODIUM 5 MG/1
5 TABLET ORAL NIGHTLY
Qty: 30 TABLET | Refills: 0 | Status: SHIPPED | OUTPATIENT
Start: 2020-01-01

## 2020-01-01 RX ORDER — LORAZEPAM 2 MG/ML
INJECTION INTRAMUSCULAR
Status: COMPLETED
Start: 2020-01-01 | End: 2020-01-01

## 2020-01-01 RX ORDER — POTASSIUM CHLORIDE 1.5 G/1.77G
40 POWDER, FOR SOLUTION ORAL AS NEEDED
Status: DISCONTINUED | OUTPATIENT
Start: 2020-01-01 | End: 2020-01-01

## 2020-01-01 RX ORDER — ACETAMINOPHEN 160 MG/5ML
650 SOLUTION ORAL EVERY 4 HOURS PRN
Status: DISCONTINUED | OUTPATIENT
Start: 2020-01-01 | End: 2020-08-20 | Stop reason: HOSPADM

## 2020-01-01 RX ORDER — LORAZEPAM 2 MG/ML
1 INJECTION INTRAMUSCULAR
Status: DISCONTINUED | OUTPATIENT
Start: 2020-01-01 | End: 2020-08-20 | Stop reason: HOSPADM

## 2020-01-01 RX ORDER — POTASSIUM CHLORIDE 7.45 MG/ML
10 INJECTION INTRAVENOUS
Status: DISCONTINUED | OUTPATIENT
Start: 2020-01-01 | End: 2020-01-01

## 2020-01-01 RX ORDER — SODIUM CHLORIDE 0.9 % (FLUSH) 0.9 %
10 SYRINGE (ML) INJECTION EVERY 12 HOURS SCHEDULED
Status: DISCONTINUED | OUTPATIENT
Start: 2020-01-01 | End: 2020-08-20 | Stop reason: HOSPADM

## 2020-01-01 RX ORDER — POTASSIUM CHLORIDE 20MEQ/15ML
20 LIQUID (ML) ORAL 3 TIMES DAILY
Status: DISCONTINUED | OUTPATIENT
Start: 2020-01-01 | End: 2020-01-01

## 2020-01-01 RX ORDER — LORAZEPAM 2 MG/ML
0.5 CONCENTRATE ORAL
Status: DISCONTINUED | OUTPATIENT
Start: 2020-01-01 | End: 2020-08-20 | Stop reason: HOSPADM

## 2020-01-01 RX ORDER — SODIUM CHLORIDE 9 MG/ML
35 INJECTION, SOLUTION INTRAVENOUS CONTINUOUS
Status: DISCONTINUED | OUTPATIENT
Start: 2020-01-01 | End: 2020-01-01

## 2020-01-01 RX ORDER — FAMOTIDINE 20 MG/1
20 TABLET, FILM COATED ORAL
Status: DISCONTINUED | OUTPATIENT
Start: 2020-01-01 | End: 2020-01-01

## 2020-01-01 RX ORDER — HEPARIN SODIUM 10000 [USP'U]/100ML
18 INJECTION, SOLUTION INTRAVENOUS
Status: DISCONTINUED | OUTPATIENT
Start: 2020-01-01 | End: 2020-01-01

## 2020-01-01 RX ORDER — FAMOTIDINE 20 MG/1
20 TABLET, FILM COATED ORAL DAILY
Status: DISCONTINUED | OUTPATIENT
Start: 2020-01-01 | End: 2020-01-01

## 2020-01-01 RX ORDER — LABETALOL HYDROCHLORIDE 5 MG/ML
20 INJECTION, SOLUTION INTRAVENOUS EVERY 6 HOURS PRN
Status: DISCONTINUED | OUTPATIENT
Start: 2020-01-01 | End: 2020-01-01

## 2020-01-01 RX ORDER — MORPHINE SULFATE 20 MG/ML
10 SOLUTION ORAL
Status: DISCONTINUED | OUTPATIENT
Start: 2020-01-01 | End: 2020-08-20 | Stop reason: HOSPADM

## 2020-01-01 RX ORDER — NICOTINE POLACRILEX 4 MG
15 LOZENGE BUCCAL
Status: DISCONTINUED | OUTPATIENT
Start: 2020-01-01 | End: 2020-01-01

## 2020-01-01 RX ORDER — SODIUM CHLORIDE 0.9 % (FLUSH) 0.9 %
10 SYRINGE (ML) INJECTION AS NEEDED
Status: DISCONTINUED | OUTPATIENT
Start: 2020-01-01 | End: 2020-01-01 | Stop reason: HOSPADM

## 2020-01-01 RX ORDER — MORPHINE SULFATE 10 MG/ML
6 INJECTION INTRAMUSCULAR; INTRAVENOUS; SUBCUTANEOUS
Status: DISCONTINUED | OUTPATIENT
Start: 2020-01-01 | End: 2020-08-20 | Stop reason: HOSPADM

## 2020-01-01 RX ORDER — FENTANYL CITRATE 50 UG/ML
75 INJECTION, SOLUTION INTRAMUSCULAR; INTRAVENOUS
Status: DISCONTINUED | OUTPATIENT
Start: 2020-01-01 | End: 2020-01-01

## 2020-01-01 RX ORDER — AMITRIPTYLINE HYDROCHLORIDE 50 MG/1
50 TABLET, FILM COATED ORAL NIGHTLY
COMMUNITY

## 2020-01-01 RX ORDER — ACETAMINOPHEN 160 MG/5ML
650 SOLUTION ORAL EVERY 4 HOURS PRN
Status: DISCONTINUED | OUTPATIENT
Start: 2020-01-01 | End: 2020-01-01 | Stop reason: HOSPADM

## 2020-01-01 RX ORDER — SODIUM CHLORIDE 9 MG/ML
100 INJECTION, SOLUTION INTRAVENOUS CONTINUOUS
Status: ACTIVE | OUTPATIENT
Start: 2020-01-01 | End: 2020-01-01

## 2020-01-01 RX ORDER — AMLODIPINE BESYLATE 10 MG/1
10 TABLET ORAL DAILY
Status: DISCONTINUED | OUTPATIENT
Start: 2020-01-01 | End: 2020-01-01 | Stop reason: HOSPADM

## 2020-01-01 RX ORDER — SODIUM CHLORIDE 0.9 % (FLUSH) 0.9 %
20 SYRINGE (ML) INJECTION AS NEEDED
Status: DISCONTINUED | OUTPATIENT
Start: 2020-01-01 | End: 2020-08-20 | Stop reason: HOSPADM

## 2020-01-01 RX ORDER — HEPARIN SODIUM 5000 [USP'U]/ML
40-80 INJECTION, SOLUTION INTRAVENOUS; SUBCUTANEOUS EVERY 6 HOURS PRN
Status: DISCONTINUED | OUTPATIENT
Start: 2020-01-01 | End: 2020-01-01

## 2020-01-01 RX ORDER — MAGNESIUM SULFATE HEPTAHYDRATE 40 MG/ML
2 INJECTION, SOLUTION INTRAVENOUS AS NEEDED
Status: DISCONTINUED | OUTPATIENT
Start: 2020-01-01 | End: 2020-08-20 | Stop reason: HOSPADM

## 2020-01-01 RX ORDER — AMLODIPINE BESYLATE 10 MG/1
10 TABLET ORAL DAILY
Qty: 30 TABLET | Refills: 6 | Status: SHIPPED | OUTPATIENT
Start: 2020-01-01 | End: 2020-01-01

## 2020-01-01 RX ORDER — ACETAMINOPHEN 325 MG/1
650 TABLET ORAL EVERY 6 HOURS PRN
Status: DISCONTINUED | OUTPATIENT
Start: 2020-01-01 | End: 2020-01-01 | Stop reason: HOSPADM

## 2020-01-01 RX ORDER — ROCURONIUM BROMIDE 10 MG/ML
100 INJECTION, SOLUTION INTRAVENOUS ONCE
Status: COMPLETED | OUTPATIENT
Start: 2020-01-01 | End: 2020-01-01

## 2020-01-01 RX ORDER — POTASSIUM CHLORIDE 1.5 G/1.77G
20 POWDER, FOR SOLUTION ORAL 3 TIMES DAILY
Status: DISCONTINUED | OUTPATIENT
Start: 2020-01-01 | End: 2020-01-01

## 2020-01-01 RX ORDER — SODIUM CHLORIDE 9 MG/ML
100 INJECTION, SOLUTION INTRAVENOUS CONTINUOUS
Status: DISCONTINUED | OUTPATIENT
Start: 2020-01-01 | End: 2020-01-01

## 2020-01-01 RX ORDER — LORAZEPAM 2 MG/ML
3 INJECTION INTRAMUSCULAR ONCE
Status: COMPLETED | OUTPATIENT
Start: 2020-01-01 | End: 2020-01-01

## 2020-01-01 RX ORDER — POTASSIUM CHLORIDE 20MEQ/15ML
40 LIQUID (ML) ORAL ONCE
Status: DISCONTINUED | OUTPATIENT
Start: 2020-01-01 | End: 2020-01-01

## 2020-01-01 RX ORDER — MORPHINE SULFATE 20 MG/ML
20 SOLUTION ORAL
Status: DISCONTINUED | OUTPATIENT
Start: 2020-01-01 | End: 2020-08-20 | Stop reason: HOSPADM

## 2020-01-01 RX ORDER — ALBUTEROL SULFATE 90 UG/1
6 AEROSOL, METERED RESPIRATORY (INHALATION)
Status: DISCONTINUED | OUTPATIENT
Start: 2020-01-01 | End: 2020-08-20 | Stop reason: HOSPADM

## 2020-01-01 RX ORDER — MORPHINE SULFATE 2 MG/ML
2 INJECTION, SOLUTION INTRAMUSCULAR; INTRAVENOUS
Status: DISCONTINUED | OUTPATIENT
Start: 2020-01-01 | End: 2020-01-01 | Stop reason: HOSPADM

## 2020-01-01 RX ORDER — ONDANSETRON 4 MG/1
4 TABLET, FILM COATED ORAL EVERY 6 HOURS PRN
Status: DISCONTINUED | OUTPATIENT
Start: 2020-01-01 | End: 2020-08-20 | Stop reason: HOSPADM

## 2020-01-01 RX ORDER — UREA 10 %
3 LOTION (ML) TOPICAL NIGHTLY PRN
Status: DISCONTINUED | OUTPATIENT
Start: 2020-01-01 | End: 2020-01-01 | Stop reason: HOSPADM

## 2020-01-01 RX ORDER — CEPHALEXIN 500 MG/1
500 CAPSULE ORAL EVERY 24 HOURS
Status: DISCONTINUED | OUTPATIENT
Start: 2020-01-01 | End: 2020-01-01

## 2020-01-01 RX ORDER — AMLODIPINE BESYLATE 10 MG/1
10 TABLET ORAL DAILY
Status: DISCONTINUED | OUTPATIENT
Start: 2020-01-01 | End: 2020-01-01

## 2020-01-01 RX ORDER — LORAZEPAM 2 MG/ML
1 INJECTION INTRAMUSCULAR ONCE
Status: COMPLETED | OUTPATIENT
Start: 2020-01-01 | End: 2020-01-01

## 2020-01-01 RX ORDER — LOSARTAN POTASSIUM AND HYDROCHLOROTHIAZIDE 25; 100 MG/1; MG/1
1 TABLET ORAL DAILY
COMMUNITY
Start: 2020-01-01

## 2020-01-01 RX ORDER — ACETAMINOPHEN 650 MG/1
650 SUPPOSITORY RECTAL EVERY 4 HOURS PRN
Status: DISCONTINUED | OUTPATIENT
Start: 2020-01-01 | End: 2020-08-20 | Stop reason: HOSPADM

## 2020-01-01 RX ORDER — FAMOTIDINE 20 MG/1
20 TABLET, FILM COATED ORAL EVERY 12 HOURS
Status: DISCONTINUED | OUTPATIENT
Start: 2020-01-01 | End: 2020-01-01 | Stop reason: HOSPADM

## 2020-01-01 RX ORDER — HEPARIN SODIUM 10000 [USP'U]/100ML
14.4 INJECTION, SOLUTION INTRAVENOUS
Status: DISCONTINUED | OUTPATIENT
Start: 2020-01-01 | End: 2020-01-01

## 2020-01-01 RX ORDER — SODIUM CHLORIDE 9 MG/ML
30 INJECTION, SOLUTION INTRAVENOUS CONTINUOUS
Status: DISCONTINUED | OUTPATIENT
Start: 2020-01-01 | End: 2020-01-01

## 2020-01-01 RX ORDER — ETOMIDATE 2 MG/ML
30 INJECTION INTRAVENOUS ONCE
Status: COMPLETED | OUTPATIENT
Start: 2020-01-01 | End: 2020-01-01

## 2020-01-01 RX ORDER — MIDAZOLAM HYDROCHLORIDE 1 MG/ML
INJECTION INTRAMUSCULAR; INTRAVENOUS AS NEEDED
Status: DISCONTINUED | OUTPATIENT
Start: 2020-01-01 | End: 2020-01-01 | Stop reason: HOSPADM

## 2020-01-01 RX ORDER — POTASSIUM CHLORIDE 750 MG/1
40 CAPSULE, EXTENDED RELEASE ORAL AS NEEDED
Status: DISCONTINUED | OUTPATIENT
Start: 2020-01-01 | End: 2020-01-01

## 2020-01-01 RX ORDER — SODIUM CHLORIDE 0.9 % (FLUSH) 0.9 %
10 SYRINGE (ML) INJECTION EVERY 12 HOURS SCHEDULED
Status: DISCONTINUED | OUTPATIENT
Start: 2020-01-01 | End: 2020-01-01 | Stop reason: HOSPADM

## 2020-01-01 RX ORDER — SODIUM CHLORIDE 0.9 % (FLUSH) 0.9 %
10 SYRINGE (ML) INJECTION AS NEEDED
Status: DISCONTINUED | OUTPATIENT
Start: 2020-01-01 | End: 2020-08-20 | Stop reason: HOSPADM

## 2020-01-01 RX ORDER — LIDOCAINE HYDROCHLORIDE 20 MG/ML
INJECTION, SOLUTION INFILTRATION; PERINEURAL AS NEEDED
Status: DISCONTINUED | OUTPATIENT
Start: 2020-01-01 | End: 2020-01-01 | Stop reason: HOSPADM

## 2020-01-01 RX ORDER — MANNITOL 20 G/100ML
50 INJECTION, SOLUTION INTRAVENOUS EVERY 6 HOURS
Status: COMPLETED | OUTPATIENT
Start: 2020-01-01 | End: 2020-01-01

## 2020-01-01 RX ORDER — LABETALOL HYDROCHLORIDE 5 MG/ML
INJECTION, SOLUTION INTRAVENOUS
Status: COMPLETED
Start: 2020-01-01 | End: 2020-01-01

## 2020-01-01 RX ORDER — AMLODIPINE BESYLATE 10 MG/1
10 TABLET ORAL
Status: DISCONTINUED | OUTPATIENT
Start: 2020-01-01 | End: 2020-01-01

## 2020-01-01 RX ORDER — DEXTROSE MONOHYDRATE 25 G/50ML
25 INJECTION, SOLUTION INTRAVENOUS
Status: DISCONTINUED | OUTPATIENT
Start: 2020-01-01 | End: 2020-01-01

## 2020-01-01 RX ORDER — PREGABALIN 75 MG/1
75 CAPSULE ORAL 2 TIMES DAILY
COMMUNITY

## 2020-01-01 RX ORDER — HYDROCODONE BITARTRATE AND ACETAMINOPHEN 7.5; 325 MG/1; MG/1
1 TABLET ORAL EVERY 6 HOURS PRN
Status: DISCONTINUED | OUTPATIENT
Start: 2020-01-01 | End: 2020-01-01 | Stop reason: HOSPADM

## 2020-01-01 RX ORDER — LORAZEPAM 2 MG/ML
0.5 INJECTION INTRAMUSCULAR
Status: DISCONTINUED | OUTPATIENT
Start: 2020-01-01 | End: 2020-08-20 | Stop reason: HOSPADM

## 2020-01-01 RX ORDER — ONDANSETRON 2 MG/ML
4 INJECTION INTRAMUSCULAR; INTRAVENOUS EVERY 6 HOURS PRN
Status: DISCONTINUED | OUTPATIENT
Start: 2020-01-01 | End: 2020-08-20 | Stop reason: HOSPADM

## 2020-01-01 RX ORDER — LEVETIRACETAM 10 MG/ML
1000 INJECTION INTRAVASCULAR EVERY 12 HOURS SCHEDULED
Status: DISCONTINUED | OUTPATIENT
Start: 2020-01-01 | End: 2020-01-01

## 2020-01-01 RX ORDER — MORPHINE SULFATE 2 MG/ML
4 INJECTION, SOLUTION INTRAMUSCULAR; INTRAVENOUS
Status: DISCONTINUED | OUTPATIENT
Start: 2020-01-01 | End: 2020-08-20 | Stop reason: HOSPADM

## 2020-01-01 RX ORDER — ONDANSETRON 4 MG/1
4 TABLET, FILM COATED ORAL EVERY 6 HOURS PRN
Status: DISCONTINUED | OUTPATIENT
Start: 2020-01-01 | End: 2020-01-01 | Stop reason: HOSPADM

## 2020-01-01 RX ORDER — LORAZEPAM 2 MG/ML
1 CONCENTRATE ORAL
Status: DISCONTINUED | OUTPATIENT
Start: 2020-01-01 | End: 2020-08-20 | Stop reason: HOSPADM

## 2020-01-01 RX ORDER — LABETALOL HYDROCHLORIDE 5 MG/ML
20 INJECTION, SOLUTION INTRAVENOUS
Status: DISCONTINUED | OUTPATIENT
Start: 2020-01-01 | End: 2020-01-01

## 2020-01-01 RX ORDER — LORAZEPAM 2 MG/ML
2 CONCENTRATE ORAL
Status: DISCONTINUED | OUTPATIENT
Start: 2020-01-01 | End: 2020-08-20 | Stop reason: HOSPADM

## 2020-01-01 RX ORDER — HEPARIN SODIUM 10000 [USP'U]/100ML
13.9 INJECTION, SOLUTION INTRAVENOUS
Status: DISCONTINUED | OUTPATIENT
Start: 2020-01-01 | End: 2020-01-01

## 2020-01-01 RX ORDER — MANNITOL 20 G/100ML
50 INJECTION, SOLUTION INTRAVENOUS EVERY 12 HOURS
Status: COMPLETED | OUTPATIENT
Start: 2020-01-01 | End: 2020-01-01

## 2020-01-01 RX ORDER — AMLODIPINE BESYLATE 10 MG/1
10 TABLET ORAL DAILY
Qty: 30 TABLET | Refills: 6 | Status: SHIPPED | OUTPATIENT
Start: 2020-01-01

## 2020-01-01 RX ORDER — ACETAMINOPHEN 325 MG/1
650 TABLET ORAL EVERY 4 HOURS PRN
Status: DISCONTINUED | OUTPATIENT
Start: 2020-01-01 | End: 2020-08-20 | Stop reason: HOSPADM

## 2020-01-01 RX ORDER — FUROSEMIDE 10 MG/ML
40 INJECTION INTRAMUSCULAR; INTRAVENOUS 3 TIMES DAILY
Status: DISCONTINUED | OUTPATIENT
Start: 2020-01-01 | End: 2020-01-01

## 2020-01-01 RX ORDER — MAGNESIUM SULFATE HEPTAHYDRATE 40 MG/ML
4 INJECTION, SOLUTION INTRAVENOUS AS NEEDED
Status: DISCONTINUED | OUTPATIENT
Start: 2020-01-01 | End: 2020-08-20 | Stop reason: HOSPADM

## 2020-01-01 RX ORDER — DIPHENOXYLATE HYDROCHLORIDE AND ATROPINE SULFATE 2.5; .025 MG/1; MG/1
1 TABLET ORAL
Status: DISCONTINUED | OUTPATIENT
Start: 2020-01-01 | End: 2020-08-20 | Stop reason: HOSPADM

## 2020-01-01 RX ORDER — CEPHALEXIN 500 MG/1
500 CAPSULE ORAL EVERY 12 HOURS SCHEDULED
Status: DISCONTINUED | OUTPATIENT
Start: 2020-01-01 | End: 2020-01-01

## 2020-01-01 RX ORDER — HYDRALAZINE HYDROCHLORIDE 50 MG/1
50 TABLET, FILM COATED ORAL EVERY 8 HOURS SCHEDULED
Status: DISCONTINUED | OUTPATIENT
Start: 2020-01-01 | End: 2020-01-01

## 2020-01-01 RX ORDER — POLYETHYLENE GLYCOL 3350 17 G/17G
17 POWDER, FOR SOLUTION ORAL DAILY PRN
Status: DISCONTINUED | OUTPATIENT
Start: 2020-01-01 | End: 2020-08-20 | Stop reason: HOSPADM

## 2020-01-01 RX ORDER — ACETAMINOPHEN 325 MG/1
650 TABLET ORAL EVERY 4 HOURS PRN
Status: DISCONTINUED | OUTPATIENT
Start: 2020-01-01 | End: 2020-01-01 | Stop reason: HOSPADM

## 2020-01-01 RX ORDER — ONDANSETRON 2 MG/ML
4 INJECTION INTRAMUSCULAR; INTRAVENOUS ONCE
Status: COMPLETED | OUTPATIENT
Start: 2020-01-01 | End: 2020-01-01

## 2020-01-01 RX ORDER — CHLORHEXIDINE GLUCONATE 0.12 MG/ML
15 RINSE ORAL EVERY 12 HOURS SCHEDULED
Status: DISCONTINUED | OUTPATIENT
Start: 2020-01-01 | End: 2020-01-01

## 2020-01-01 RX ORDER — HYDROCODONE BITARTRATE AND ACETAMINOPHEN 5; 325 MG/1; MG/1
1 TABLET ORAL EVERY 4 HOURS PRN
Status: DISCONTINUED | OUTPATIENT
Start: 2020-01-01 | End: 2020-01-01 | Stop reason: HOSPADM

## 2020-01-01 RX ORDER — LORAZEPAM 2 MG/ML
2 INJECTION INTRAMUSCULAR
Status: DISCONTINUED | OUTPATIENT
Start: 2020-01-01 | End: 2020-08-20 | Stop reason: HOSPADM

## 2020-01-01 RX ORDER — ACETAMINOPHEN 650 MG/1
650 SUPPOSITORY RECTAL EVERY 4 HOURS PRN
Status: DISCONTINUED | OUTPATIENT
Start: 2020-01-01 | End: 2020-01-01 | Stop reason: HOSPADM

## 2020-01-01 RX ORDER — FENTANYL CITRATE 50 UG/ML
INJECTION, SOLUTION INTRAMUSCULAR; INTRAVENOUS AS NEEDED
Status: DISCONTINUED | OUTPATIENT
Start: 2020-01-01 | End: 2020-01-01 | Stop reason: HOSPADM

## 2020-01-01 RX ADMIN — HYDROMORPHONE HYDROCHLORIDE 1 MG: 1 INJECTION, SOLUTION INTRAMUSCULAR; INTRAVENOUS; SUBCUTANEOUS at 15:28

## 2020-01-01 RX ADMIN — FENTANYL CITRATE 75 MCG: 50 INJECTION INTRAMUSCULAR; INTRAVENOUS at 20:50

## 2020-01-01 RX ADMIN — SODIUM CHLORIDE 15 MG/HR: 9 INJECTION, SOLUTION INTRAVENOUS at 07:41

## 2020-01-01 RX ADMIN — LORAZEPAM 2 MG: 2 INJECTION INTRAMUSCULAR; INTRAVENOUS at 21:28

## 2020-01-01 RX ADMIN — FENTANYL CITRATE 75 MCG: 50 INJECTION INTRAMUSCULAR; INTRAVENOUS at 18:38

## 2020-01-01 RX ADMIN — SODIUM CHLORIDE, PRESERVATIVE FREE 10 ML: 5 INJECTION INTRAVENOUS at 08:05

## 2020-01-01 RX ADMIN — SODIUM CHLORIDE, PRESERVATIVE FREE 10 ML: 5 INJECTION INTRAVENOUS at 21:12

## 2020-01-01 RX ADMIN — AMLODIPINE BESYLATE 10 MG: 10 TABLET ORAL at 21:54

## 2020-01-01 RX ADMIN — HYDROCODONE BITARTRATE AND ACETAMINOPHEN 1 TABLET: 7.5; 325 TABLET ORAL at 05:44

## 2020-01-01 RX ADMIN — LEVETIRACETAM 750 MG: 500 INJECTION, SOLUTION INTRAVENOUS at 20:30

## 2020-01-01 RX ADMIN — LEVETIRACETAM 1000 MG: 10 INJECTION INTRAVENOUS at 13:25

## 2020-01-01 RX ADMIN — SODIUM CHLORIDE, PRESERVATIVE FREE 10 ML: 5 INJECTION INTRAVENOUS at 09:00

## 2020-01-01 RX ADMIN — FENTANYL CITRATE 75 MCG: 50 INJECTION INTRAMUSCULAR; INTRAVENOUS at 09:45

## 2020-01-01 RX ADMIN — FENTANYL CITRATE 75 MCG: 50 INJECTION INTRAMUSCULAR; INTRAVENOUS at 03:36

## 2020-01-01 RX ADMIN — PROPOFOL 50 MCG/KG/MIN: 10 INJECTION, EMULSION INTRAVENOUS at 03:00

## 2020-01-01 RX ADMIN — LEVETIRACETAM 1000 MG: 10 INJECTION INTRAVENOUS at 07:50

## 2020-01-01 RX ADMIN — SODIUM CHLORIDE 15 MG/HR: 9 INJECTION, SOLUTION INTRAVENOUS at 06:48

## 2020-01-01 RX ADMIN — HYDROCODONE BITARTRATE AND ACETAMINOPHEN 1 TABLET: 7.5; 325 TABLET ORAL at 20:30

## 2020-01-01 RX ADMIN — HYDROCODONE BITARTRATE AND ACETAMINOPHEN 1 TABLET: 5; 325 TABLET ORAL at 09:34

## 2020-01-01 RX ADMIN — LABETALOL HYDROCHLORIDE 20 MG: 5 INJECTION, SOLUTION INTRAVENOUS at 08:57

## 2020-01-01 RX ADMIN — PROPOFOL 25 MCG/KG/MIN: 10 INJECTION, EMULSION INTRAVENOUS at 11:16

## 2020-01-01 RX ADMIN — PROPOFOL 50 MCG/KG/MIN: 10 INJECTION, EMULSION INTRAVENOUS at 00:29

## 2020-01-01 RX ADMIN — SODIUM CHLORIDE 15 MG/HR: 9 INJECTION, SOLUTION INTRAVENOUS at 21:46

## 2020-01-01 RX ADMIN — SODIUM CHLORIDE, PRESERVATIVE FREE 10 ML: 5 INJECTION INTRAVENOUS at 20:19

## 2020-01-01 RX ADMIN — CEPHALEXIN 500 MG: 500 CAPSULE ORAL at 13:05

## 2020-01-01 RX ADMIN — SODIUM CHLORIDE 100 ML/HR: 9 INJECTION, SOLUTION INTRAVENOUS at 16:30

## 2020-01-01 RX ADMIN — HYDROCODONE BITARTRATE AND ACETAMINOPHEN 1 TABLET: 5; 325 TABLET ORAL at 22:49

## 2020-01-01 RX ADMIN — SODIUM CHLORIDE, PRESERVATIVE FREE 10 ML: 5 INJECTION INTRAVENOUS at 20:20

## 2020-01-01 RX ADMIN — PROTHROMBIN, COAGULATION FACTOR VII HUMAN, COAGULATION FACTOR IX HUMAN, COAGULATION FACTOR X HUMAN, PROTEIN C, PROTEIN S HUMAN, AND WATER 4946 UNITS: KIT at 12:39

## 2020-01-01 RX ADMIN — SODIUM CHLORIDE, PRESERVATIVE FREE 10 ML: 5 INJECTION INTRAVENOUS at 08:20

## 2020-01-01 RX ADMIN — PROPOFOL 50 MCG/KG/MIN: 10 INJECTION, EMULSION INTRAVENOUS at 06:30

## 2020-01-01 RX ADMIN — MORPHINE SULFATE 4 MG: 2 INJECTION, SOLUTION INTRAMUSCULAR; INTRAVENOUS at 18:29

## 2020-01-01 RX ADMIN — SODIUM CHLORIDE, PRESERVATIVE FREE 10 ML: 5 INJECTION INTRAVENOUS at 20:21

## 2020-01-01 RX ADMIN — FAMOTIDINE 20 MG: 10 INJECTION INTRAVENOUS at 20:25

## 2020-01-01 RX ADMIN — CHLORHEXIDINE GLUCONATE 15 ML: 1.2 RINSE ORAL at 20:25

## 2020-01-01 RX ADMIN — FAMOTIDINE 20 MG: 20 TABLET, FILM COATED ORAL at 08:26

## 2020-01-01 RX ADMIN — LEVETIRACETAM 1000 MG: 10 INJECTION INTRAVENOUS at 06:49

## 2020-01-01 RX ADMIN — SODIUM CHLORIDE, PRESERVATIVE FREE 10 ML: 5 INJECTION INTRAVENOUS at 08:09

## 2020-01-01 RX ADMIN — SODIUM CHLORIDE 10 MG/HR: 9 INJECTION, SOLUTION INTRAVENOUS at 21:18

## 2020-01-01 RX ADMIN — ALBUTEROL SULFATE 6 PUFF: 90 AEROSOL, METERED RESPIRATORY (INHALATION) at 19:25

## 2020-01-01 RX ADMIN — CEPHALEXIN 500 MG: 500 CAPSULE ORAL at 08:26

## 2020-01-01 RX ADMIN — AMPICILLIN SODIUM AND SULBACTAM SODIUM 3 G: 2; 1 INJECTION, POWDER, FOR SOLUTION INTRAMUSCULAR; INTRAVENOUS at 04:19

## 2020-01-01 RX ADMIN — FENTANYL CITRATE 75 MCG: 50 INJECTION INTRAMUSCULAR; INTRAVENOUS at 21:21

## 2020-01-01 RX ADMIN — PROPOFOL 50 MCG/KG/MIN: 10 INJECTION, EMULSION INTRAVENOUS at 17:30

## 2020-01-01 RX ADMIN — LABETALOL HYDROCHLORIDE 20 MG: 5 INJECTION, SOLUTION INTRAVENOUS at 15:16

## 2020-01-01 RX ADMIN — MORPHINE SULFATE 4 MG: 2 INJECTION, SOLUTION INTRAMUSCULAR; INTRAVENOUS at 20:02

## 2020-01-01 RX ADMIN — HYDROCODONE BITARTRATE AND ACETAMINOPHEN 1 TABLET: 5; 325 TABLET ORAL at 00:37

## 2020-01-01 RX ADMIN — FENTANYL CITRATE 75 MCG: 50 INJECTION INTRAMUSCULAR; INTRAVENOUS at 08:03

## 2020-01-01 RX ADMIN — POTASSIUM PHOSPHATE, MONOBASIC AND POTASSIUM PHOSPHATE, DIBASIC 30 MMOL: 224; 236 INJECTION, SOLUTION, CONCENTRATE INTRAVENOUS at 13:47

## 2020-01-01 RX ADMIN — ENOXAPARIN SODIUM 110 MG: 120 INJECTION SUBCUTANEOUS at 18:23

## 2020-01-01 RX ADMIN — LORAZEPAM 2 MG: 2 INJECTION INTRAMUSCULAR; INTRAVENOUS at 20:03

## 2020-01-01 RX ADMIN — SODIUM CHLORIDE 10 MG/HR: 9 INJECTION, SOLUTION INTRAVENOUS at 07:29

## 2020-01-01 RX ADMIN — SODIUM CHLORIDE, PRESERVATIVE FREE 10 ML: 5 INJECTION INTRAVENOUS at 08:07

## 2020-01-01 RX ADMIN — AMPICILLIN SODIUM AND SULBACTAM SODIUM 3 G: 2; 1 INJECTION, POWDER, FOR SOLUTION INTRAMUSCULAR; INTRAVENOUS at 16:52

## 2020-01-01 RX ADMIN — CHLORHEXIDINE GLUCONATE 15 ML: 1.2 RINSE ORAL at 20:47

## 2020-01-01 RX ADMIN — POTASSIUM CHLORIDE 20 MEQ: 1.5 POWDER, FOR SOLUTION ORAL at 08:40

## 2020-01-01 RX ADMIN — FENTANYL CITRATE 75 MCG: 50 INJECTION INTRAMUSCULAR; INTRAVENOUS at 05:34

## 2020-01-01 RX ADMIN — POTASSIUM CHLORIDE 20 MEQ: 1.5 POWDER, FOR SOLUTION ORAL at 11:44

## 2020-01-01 RX ADMIN — PROPOFOL 25 MCG/KG/MIN: 10 INJECTION, EMULSION INTRAVENOUS at 17:16

## 2020-01-01 RX ADMIN — FENTANYL CITRATE 75 MCG: 50 INJECTION INTRAMUSCULAR; INTRAVENOUS at 21:14

## 2020-01-01 RX ADMIN — HYDROCODONE BITARTRATE AND ACETAMINOPHEN 1 TABLET: 5; 325 TABLET ORAL at 18:22

## 2020-01-01 RX ADMIN — PROPOFOL 25 MCG/KG/MIN: 10 INJECTION, EMULSION INTRAVENOUS at 02:24

## 2020-01-01 RX ADMIN — FUROSEMIDE 40 MG: 10 INJECTION, SOLUTION INTRAMUSCULAR; INTRAVENOUS at 20:55

## 2020-01-01 RX ADMIN — SODIUM CHLORIDE, PRESERVATIVE FREE 10 ML: 5 INJECTION INTRAVENOUS at 20:26

## 2020-01-01 RX ADMIN — LEVETIRACETAM 1000 MG: 10 INJECTION INTRAVENOUS at 07:53

## 2020-01-01 RX ADMIN — LEVETIRACETAM 1000 MG: 10 INJECTION INTRAVENOUS at 07:55

## 2020-01-01 RX ADMIN — LORAZEPAM 2 MG: 2 INJECTION INTRAMUSCULAR; INTRAVENOUS at 18:30

## 2020-01-01 RX ADMIN — FENTANYL CITRATE 75 MCG: 50 INJECTION INTRAMUSCULAR; INTRAVENOUS at 01:26

## 2020-01-01 RX ADMIN — PROPOFOL 35 MCG/KG/MIN: 10 INJECTION, EMULSION INTRAVENOUS at 16:51

## 2020-01-01 RX ADMIN — PROPOFOL 50 MCG/KG/MIN: 10 INJECTION, EMULSION INTRAVENOUS at 20:46

## 2020-01-01 RX ADMIN — ROCURONIUM BROMIDE 100 MG: 50 INJECTION INTRAVENOUS at 12:55

## 2020-01-01 RX ADMIN — MANNITOL 50 G: 20 INJECTION, SOLUTION INTRAVENOUS at 04:10

## 2020-01-01 RX ADMIN — PROPOFOL 45 MCG/KG/MIN: 10 INJECTION, EMULSION INTRAVENOUS at 00:19

## 2020-01-01 RX ADMIN — AMPICILLIN SODIUM AND SULBACTAM SODIUM 3 G: 2; 1 INJECTION, POWDER, FOR SOLUTION INTRAMUSCULAR; INTRAVENOUS at 16:04

## 2020-01-01 RX ADMIN — FENTANYL CITRATE 75 MCG: 50 INJECTION INTRAMUSCULAR; INTRAVENOUS at 07:18

## 2020-01-01 RX ADMIN — AMLODIPINE BESYLATE 10 MG: 10 TABLET ORAL at 20:28

## 2020-01-01 RX ADMIN — LEVETIRACETAM 750 MG: 500 INJECTION, SOLUTION INTRAVENOUS at 20:44

## 2020-01-01 RX ADMIN — ONDANSETRON 4 MG: 2 INJECTION INTRAMUSCULAR; INTRAVENOUS at 10:12

## 2020-01-01 RX ADMIN — Medication 3 MG: at 21:54

## 2020-01-01 RX ADMIN — PROPOFOL 25 MCG/KG/MIN: 10 INJECTION, EMULSION INTRAVENOUS at 11:14

## 2020-01-01 RX ADMIN — PROPOFOL 25 MCG/KG/MIN: 10 INJECTION, EMULSION INTRAVENOUS at 05:06

## 2020-01-01 RX ADMIN — SODIUM CHLORIDE 5 MG/HR: 9 INJECTION, SOLUTION INTRAVENOUS at 11:31

## 2020-01-01 RX ADMIN — PROPOFOL 15 MCG/KG/MIN: 10 INJECTION, EMULSION INTRAVENOUS at 18:26

## 2020-01-01 RX ADMIN — GLYCOPYRROLATE 0.4 MG: 0.2 INJECTION, SOLUTION INTRAMUSCULAR; INTRAVITREAL at 20:02

## 2020-01-01 RX ADMIN — HYDROCODONE BITARTRATE AND ACETAMINOPHEN 1 TABLET: 7.5; 325 TABLET ORAL at 02:16

## 2020-01-01 RX ADMIN — CHLORHEXIDINE GLUCONATE 15 ML: 1.2 RINSE ORAL at 08:21

## 2020-01-01 RX ADMIN — ONDANSETRON 4 MG: 2 INJECTION INTRAMUSCULAR; INTRAVENOUS at 09:41

## 2020-01-01 RX ADMIN — LORAZEPAM 3 MG: 2 INJECTION INTRAMUSCULAR at 15:15

## 2020-01-01 RX ADMIN — CHLORHEXIDINE GLUCONATE 15 ML: 1.2 RINSE ORAL at 08:05

## 2020-01-01 RX ADMIN — SODIUM CHLORIDE, PRESERVATIVE FREE 10 ML: 5 INJECTION INTRAVENOUS at 08:04

## 2020-01-01 RX ADMIN — SODIUM CHLORIDE 35 ML/HR: 9 INJECTION, SOLUTION INTRAVENOUS at 20:38

## 2020-01-01 RX ADMIN — FUROSEMIDE 40 MG: 10 INJECTION, SOLUTION INTRAMUSCULAR; INTRAVENOUS at 11:43

## 2020-01-01 RX ADMIN — SODIUM CHLORIDE 10 MG/HR: 9 INJECTION, SOLUTION INTRAVENOUS at 22:41

## 2020-01-01 RX ADMIN — SODIUM CHLORIDE 15 MG/HR: 9 INJECTION, SOLUTION INTRAVENOUS at 15:28

## 2020-01-01 RX ADMIN — ALTEPLASE 1 MG/HR: KIT at 20:34

## 2020-01-01 RX ADMIN — SODIUM CHLORIDE 100 ML/HR: 9 INJECTION, SOLUTION INTRAVENOUS at 13:01

## 2020-01-01 RX ADMIN — MORPHINE SULFATE 2 MG: 2 INJECTION, SOLUTION INTRAMUSCULAR; INTRAVENOUS at 15:59

## 2020-01-01 RX ADMIN — HEPARIN SODIUM 18 UNITS/KG/HR: 10000 INJECTION, SOLUTION INTRAVENOUS at 08:47

## 2020-01-01 RX ADMIN — AMPICILLIN SODIUM AND SULBACTAM SODIUM 3 G: 2; 1 INJECTION, POWDER, FOR SOLUTION INTRAMUSCULAR; INTRAVENOUS at 02:21

## 2020-01-01 RX ADMIN — GLYCOPYRROLATE 0.2 MG: 0.2 INJECTION, SOLUTION INTRAMUSCULAR; INTRAVITREAL at 15:42

## 2020-01-01 RX ADMIN — HYDRALAZINE HYDROCHLORIDE 50 MG: 50 TABLET, FILM COATED ORAL at 13:05

## 2020-01-01 RX ADMIN — FENTANYL CITRATE 75 MCG: 50 INJECTION INTRAMUSCULAR; INTRAVENOUS at 09:22

## 2020-01-01 RX ADMIN — SODIUM CHLORIDE, PRESERVATIVE FREE 10 ML: 5 INJECTION INTRAVENOUS at 12:04

## 2020-01-01 RX ADMIN — FAMOTIDINE 20 MG: 10 INJECTION INTRAVENOUS at 08:14

## 2020-01-01 RX ADMIN — HEPARIN SODIUM 13.9 UNITS/KG/HR: 10000 INJECTION, SOLUTION INTRAVENOUS at 14:55

## 2020-01-01 RX ADMIN — SODIUM CHLORIDE 15 MG/HR: 9 INJECTION, SOLUTION INTRAVENOUS at 00:26

## 2020-01-01 RX ADMIN — WARFARIN 5 MG: 5 TABLET ORAL at 18:23

## 2020-01-01 RX ADMIN — SODIUM CHLORIDE, PRESERVATIVE FREE 10 ML: 5 INJECTION INTRAVENOUS at 20:00

## 2020-01-01 RX ADMIN — APIXABAN 10 MG: 5 TABLET, FILM COATED ORAL at 08:40

## 2020-01-01 RX ADMIN — SODIUM CHLORIDE 500 ML: 9 INJECTION, SOLUTION INTRAVENOUS at 16:12

## 2020-01-01 RX ADMIN — LEVETIRACETAM 1000 MG: 10 INJECTION INTRAVENOUS at 02:15

## 2020-01-01 RX ADMIN — SODIUM CHLORIDE, PRESERVATIVE FREE 10 ML: 5 INJECTION INTRAVENOUS at 12:03

## 2020-01-01 RX ADMIN — SODIUM CHLORIDE, PRESERVATIVE FREE 10 ML: 5 INJECTION INTRAVENOUS at 08:21

## 2020-01-01 RX ADMIN — LEVETIRACETAM 1000 MG: 10 INJECTION INTRAVENOUS at 01:07

## 2020-01-01 RX ADMIN — HYDROMORPHONE HYDROCHLORIDE 1 MG: 1 INJECTION, SOLUTION INTRAMUSCULAR; INTRAVENOUS; SUBCUTANEOUS at 15:03

## 2020-01-01 RX ADMIN — CHLORHEXIDINE GLUCONATE 15 ML: 1.2 RINSE ORAL at 08:04

## 2020-01-01 RX ADMIN — SODIUM CHLORIDE, PRESERVATIVE FREE 10 ML: 5 INJECTION INTRAVENOUS at 20:09

## 2020-01-01 RX ADMIN — FENTANYL CITRATE 75 MCG: 50 INJECTION INTRAMUSCULAR; INTRAVENOUS at 21:30

## 2020-01-01 RX ADMIN — PROPOFOL 15 MCG/KG/MIN: 10 INJECTION, EMULSION INTRAVENOUS at 11:15

## 2020-01-01 RX ADMIN — PROPOFOL 35 MCG/KG/MIN: 10 INJECTION, EMULSION INTRAVENOUS at 15:36

## 2020-01-01 RX ADMIN — FENTANYL CITRATE 75 MCG: 50 INJECTION INTRAMUSCULAR; INTRAVENOUS at 00:29

## 2020-01-01 RX ADMIN — SODIUM CHLORIDE 35 ML/HR: 9 INJECTION, SOLUTION INTRAVENOUS at 20:35

## 2020-01-01 RX ADMIN — ONDANSETRON 4 MG: 2 INJECTION INTRAMUSCULAR; INTRAVENOUS at 12:08

## 2020-01-01 RX ADMIN — LABETALOL HYDROCHLORIDE 20 MG: 5 INJECTION, SOLUTION INTRAVENOUS at 03:28

## 2020-01-01 RX ADMIN — LEVETIRACETAM 1000 MG: 10 INJECTION INTRAVENOUS at 12:53

## 2020-01-01 RX ADMIN — FENTANYL CITRATE 75 MCG: 50 INJECTION INTRAMUSCULAR; INTRAVENOUS at 23:49

## 2020-01-01 RX ADMIN — HYDROCODONE BITARTRATE AND ACETAMINOPHEN 1 TABLET: 5; 325 TABLET ORAL at 04:36

## 2020-01-01 RX ADMIN — HYDROCODONE BITARTRATE AND ACETAMINOPHEN 1 TABLET: 7.5; 325 TABLET ORAL at 18:03

## 2020-01-01 RX ADMIN — SODIUM CHLORIDE 5 MG/HR: 9 INJECTION, SOLUTION INTRAVENOUS at 00:20

## 2020-01-01 RX ADMIN — FAMOTIDINE 20 MG: 20 TABLET, FILM COATED ORAL at 19:23

## 2020-01-01 RX ADMIN — MANNITOL 50 G: 20 INJECTION, SOLUTION INTRAVENOUS at 16:05

## 2020-01-01 RX ADMIN — SODIUM CHLORIDE, PRESERVATIVE FREE 10 ML: 5 INJECTION INTRAVENOUS at 20:49

## 2020-01-01 RX ADMIN — AMLODIPINE BESYLATE 10 MG: 10 TABLET ORAL at 08:45

## 2020-01-01 RX ADMIN — HYDROCODONE BITARTRATE AND ACETAMINOPHEN 1 TABLET: 5; 325 TABLET ORAL at 13:18

## 2020-01-01 RX ADMIN — FAMOTIDINE 20 MG: 20 TABLET, FILM COATED ORAL at 17:41

## 2020-01-01 RX ADMIN — SODIUM CHLORIDE 10 MG/HR: 9 INJECTION, SOLUTION INTRAVENOUS at 09:57

## 2020-01-01 RX ADMIN — LEVETIRACETAM 1000 MG: 10 INJECTION INTRAVENOUS at 20:25

## 2020-01-01 RX ADMIN — MANNITOL 50 G: 20 INJECTION, SOLUTION INTRAVENOUS at 20:49

## 2020-01-01 RX ADMIN — ALBUTEROL SULFATE 6 PUFF: 90 AEROSOL, METERED RESPIRATORY (INHALATION) at 19:37

## 2020-01-01 RX ADMIN — SODIUM CHLORIDE 7.5 MG/HR: 9 INJECTION, SOLUTION INTRAVENOUS at 03:52

## 2020-01-01 RX ADMIN — AMPICILLIN SODIUM AND SULBACTAM SODIUM 3 G: 2; 1 INJECTION, POWDER, FOR SOLUTION INTRAMUSCULAR; INTRAVENOUS at 11:00

## 2020-01-01 RX ADMIN — SODIUM CHLORIDE 7.5 MG/HR: 9 INJECTION, SOLUTION INTRAVENOUS at 18:39

## 2020-01-01 RX ADMIN — AMLODIPINE BESYLATE 10 MG: 10 TABLET ORAL at 22:39

## 2020-01-01 RX ADMIN — FENTANYL CITRATE 75 MCG: 50 INJECTION INTRAMUSCULAR; INTRAVENOUS at 22:10

## 2020-01-01 RX ADMIN — POTASSIUM CHLORIDE 10 MEQ: 7.46 INJECTION, SOLUTION INTRAVENOUS at 06:53

## 2020-01-01 RX ADMIN — AMPICILLIN SODIUM AND SULBACTAM SODIUM 3 G: 2; 1 INJECTION, POWDER, FOR SOLUTION INTRAMUSCULAR; INTRAVENOUS at 08:18

## 2020-01-01 RX ADMIN — PROPOFOL 50 MCG/KG/MIN: 10 INJECTION, EMULSION INTRAVENOUS at 06:31

## 2020-01-01 RX ADMIN — SODIUM CHLORIDE 15 MG/HR: 9 INJECTION, SOLUTION INTRAVENOUS at 02:35

## 2020-01-01 RX ADMIN — PROPOFOL 50 MCG/KG/MIN: 10 INJECTION, EMULSION INTRAVENOUS at 20:49

## 2020-01-01 RX ADMIN — LOSARTAN POTASSIUM: 50 TABLET, FILM COATED ORAL at 08:40

## 2020-01-01 RX ADMIN — CHLORHEXIDINE GLUCONATE 15 ML: 1.2 RINSE ORAL at 08:39

## 2020-01-01 RX ADMIN — IOPAMIDOL 100 ML: 755 INJECTION, SOLUTION INTRAVENOUS at 15:06

## 2020-01-01 RX ADMIN — FENTANYL CITRATE 75 MCG: 50 INJECTION INTRAMUSCULAR; INTRAVENOUS at 01:40

## 2020-01-01 RX ADMIN — LOSARTAN POTASSIUM: 50 TABLET, FILM COATED ORAL at 08:45

## 2020-01-01 RX ADMIN — HEPARIN SODIUM 400 UNITS/HR: 10000 INJECTION, SOLUTION INTRAVENOUS at 20:32

## 2020-01-01 RX ADMIN — SODIUM CHLORIDE 5 MG/HR: 9 INJECTION, SOLUTION INTRAVENOUS at 13:39

## 2020-01-01 RX ADMIN — AMPICILLIN SODIUM AND SULBACTAM SODIUM 3 G: 2; 1 INJECTION, POWDER, FOR SOLUTION INTRAMUSCULAR; INTRAVENOUS at 08:33

## 2020-01-01 RX ADMIN — LEVETIRACETAM 1000 MG: 10 INJECTION INTRAVENOUS at 20:47

## 2020-01-01 RX ADMIN — MANNITOL 50 G: 20 INJECTION, SOLUTION INTRAVENOUS at 07:54

## 2020-01-01 RX ADMIN — SODIUM CHLORIDE 15 MG/HR: 9 INJECTION, SOLUTION INTRAVENOUS at 01:41

## 2020-01-01 RX ADMIN — SODIUM CHLORIDE 7.5 MG/HR: 9 INJECTION, SOLUTION INTRAVENOUS at 04:19

## 2020-01-01 RX ADMIN — HEPARIN SODIUM 7.9 UNITS/KG/HR: 10000 INJECTION, SOLUTION INTRAVENOUS at 12:04

## 2020-01-01 RX ADMIN — AMPICILLIN SODIUM AND SULBACTAM SODIUM 3 G: 2; 1 INJECTION, POWDER, FOR SOLUTION INTRAMUSCULAR; INTRAVENOUS at 15:31

## 2020-01-01 RX ADMIN — SODIUM CHLORIDE 15 MG/HR: 9 INJECTION, SOLUTION INTRAVENOUS at 18:29

## 2020-01-01 RX ADMIN — FENTANYL CITRATE 75 MCG: 50 INJECTION INTRAMUSCULAR; INTRAVENOUS at 15:45

## 2020-01-01 RX ADMIN — ALBUTEROL SULFATE 6 PUFF: 90 AEROSOL, METERED RESPIRATORY (INHALATION) at 19:14

## 2020-01-01 RX ADMIN — ALBUTEROL SULFATE 6 PUFF: 90 AEROSOL, METERED RESPIRATORY (INHALATION) at 06:33

## 2020-01-01 RX ADMIN — LEVETIRACETAM 1000 MG: 10 INJECTION INTRAVENOUS at 00:53

## 2020-01-01 RX ADMIN — CHLORHEXIDINE GLUCONATE 15 ML: 1.2 RINSE ORAL at 20:19

## 2020-01-01 RX ADMIN — PROPOFOL 40 MCG/KG/MIN: 10 INJECTION, EMULSION INTRAVENOUS at 20:16

## 2020-01-01 RX ADMIN — POTASSIUM CHLORIDE 20 MEQ: 1.5 POWDER, FOR SOLUTION ORAL at 16:55

## 2020-01-01 RX ADMIN — LEVETIRACETAM 1000 MG: 10 INJECTION INTRAVENOUS at 12:45

## 2020-01-01 RX ADMIN — SODIUM CHLORIDE, PRESERVATIVE FREE 10 ML: 5 INJECTION INTRAVENOUS at 08:40

## 2020-01-01 RX ADMIN — CEPHALEXIN 500 MG: 500 CAPSULE ORAL at 20:30

## 2020-01-01 RX ADMIN — FAMOTIDINE 20 MG: 10 INJECTION INTRAVENOUS at 13:23

## 2020-01-01 RX ADMIN — MAGNESIUM SULFATE 4 G: 4 INJECTION INTRAVENOUS at 09:31

## 2020-01-01 RX ADMIN — ALBUTEROL SULFATE 6 PUFF: 90 AEROSOL, METERED RESPIRATORY (INHALATION) at 10:43

## 2020-01-01 RX ADMIN — HYDRALAZINE HYDROCHLORIDE 50 MG: 50 TABLET, FILM COATED ORAL at 21:09

## 2020-01-01 RX ADMIN — POTASSIUM CHLORIDE 40 MEQ: 1.5 POWDER, FOR SOLUTION ORAL at 12:53

## 2020-01-01 RX ADMIN — SODIUM CHLORIDE 12.5 MG/HR: 9 INJECTION, SOLUTION INTRAVENOUS at 15:16

## 2020-01-01 RX ADMIN — LABETALOL HYDROCHLORIDE 20 MG: 5 INJECTION, SOLUTION INTRAVENOUS at 23:41

## 2020-01-01 RX ADMIN — AMLODIPINE BESYLATE 10 MG: 10 TABLET ORAL at 11:24

## 2020-01-01 RX ADMIN — DIATRIZOATE MEGLUMINE AND DIATRIZOATE SODIUM 30 ML: 600; 100 SOLUTION ORAL; RECTAL at 08:04

## 2020-01-01 RX ADMIN — ALTEPLASE 1 MG/HR: KIT at 18:25

## 2020-01-01 RX ADMIN — ALBUTEROL SULFATE 6 PUFF: 90 AEROSOL, METERED RESPIRATORY (INHALATION) at 15:08

## 2020-01-01 RX ADMIN — FAMOTIDINE 20 MG: 20 TABLET, FILM COATED ORAL at 17:16

## 2020-01-01 RX ADMIN — POTASSIUM CHLORIDE 20 MEQ: 1.5 POWDER, FOR SOLUTION ORAL at 20:54

## 2020-01-01 RX ADMIN — HYDROCODONE BITARTRATE AND ACETAMINOPHEN 1 TABLET: 5; 325 TABLET ORAL at 04:07

## 2020-01-01 RX ADMIN — PROPOFOL 50 MCG/KG/MIN: 10 INJECTION, EMULSION INTRAVENOUS at 23:53

## 2020-01-01 RX ADMIN — FENTANYL CITRATE 75 MCG: 50 INJECTION INTRAMUSCULAR; INTRAVENOUS at 12:19

## 2020-01-01 RX ADMIN — LABETALOL HYDROCHLORIDE 20 MG: 5 INJECTION, SOLUTION INTRAVENOUS at 06:50

## 2020-01-01 RX ADMIN — SODIUM CHLORIDE 15 MG/HR: 9 INJECTION, SOLUTION INTRAVENOUS at 14:31

## 2020-01-01 RX ADMIN — SODIUM CHLORIDE 5 MG/HR: 9 INJECTION, SOLUTION INTRAVENOUS at 22:02

## 2020-01-01 RX ADMIN — AMPICILLIN SODIUM AND SULBACTAM SODIUM 3 G: 2; 1 INJECTION, POWDER, FOR SOLUTION INTRAMUSCULAR; INTRAVENOUS at 20:59

## 2020-01-01 RX ADMIN — LABETALOL HYDROCHLORIDE 20 MG: 5 INJECTION, SOLUTION INTRAVENOUS at 01:27

## 2020-01-01 RX ADMIN — PHYTONADIONE 10 MG: 10 INJECTION, EMULSION INTRAMUSCULAR; INTRAVENOUS; SUBCUTANEOUS at 12:18

## 2020-01-01 RX ADMIN — HYDROCODONE BITARTRATE AND ACETAMINOPHEN 1 TABLET: 5; 325 TABLET ORAL at 08:46

## 2020-01-01 RX ADMIN — SODIUM CHLORIDE, PRESERVATIVE FREE 10 ML: 5 INJECTION INTRAVENOUS at 20:59

## 2020-01-01 RX ADMIN — AMPICILLIN SODIUM AND SULBACTAM SODIUM 3 G: 2; 1 INJECTION, POWDER, FOR SOLUTION INTRAMUSCULAR; INTRAVENOUS at 20:47

## 2020-01-01 RX ADMIN — SODIUM CHLORIDE 10 MG/HR: 9 INJECTION, SOLUTION INTRAVENOUS at 18:22

## 2020-01-01 RX ADMIN — FAMOTIDINE 20 MG: 10 INJECTION INTRAVENOUS at 08:40

## 2020-01-01 RX ADMIN — SODIUM CHLORIDE 15 MG/HR: 9 INJECTION, SOLUTION INTRAVENOUS at 11:07

## 2020-01-01 RX ADMIN — FAMOTIDINE 20 MG: 20 TABLET, FILM COATED ORAL at 07:32

## 2020-01-01 RX ADMIN — AMPICILLIN SODIUM AND SULBACTAM SODIUM 3 G: 2; 1 INJECTION, POWDER, FOR SOLUTION INTRAMUSCULAR; INTRAVENOUS at 03:25

## 2020-01-01 RX ADMIN — SODIUM CHLORIDE 15 MG/HR: 9 INJECTION, SOLUTION INTRAVENOUS at 11:24

## 2020-01-01 RX ADMIN — LORAZEPAM 1 MG: 2 INJECTION INTRAMUSCULAR; INTRAVENOUS at 09:59

## 2020-01-01 RX ADMIN — SODIUM CHLORIDE 1000 ML: 9 INJECTION, SOLUTION INTRAVENOUS at 14:27

## 2020-01-01 RX ADMIN — MORPHINE SULFATE 4 MG: 2 INJECTION, SOLUTION INTRAMUSCULAR; INTRAVENOUS at 21:28

## 2020-01-01 RX ADMIN — AMLODIPINE BESYLATE 10 MG: 10 TABLET ORAL at 08:40

## 2020-01-01 RX ADMIN — POTASSIUM CHLORIDE 40 MEQ: 1.5 POWDER, FOR SOLUTION ORAL at 06:50

## 2020-01-01 RX ADMIN — SODIUM CHLORIDE, PRESERVATIVE FREE 10 ML: 5 INJECTION INTRAVENOUS at 08:14

## 2020-01-01 RX ADMIN — PROPOFOL 40 MCG/KG/MIN: 10 INJECTION, EMULSION INTRAVENOUS at 06:48

## 2020-01-01 RX ADMIN — ALBUTEROL SULFATE 6 PUFF: 90 AEROSOL, METERED RESPIRATORY (INHALATION) at 10:01

## 2020-01-01 RX ADMIN — INSULIN HUMAN 3 UNITS: 100 INJECTION, SOLUTION PARENTERAL at 12:11

## 2020-01-01 RX ADMIN — FENTANYL CITRATE 75 MCG: 50 INJECTION INTRAMUSCULAR; INTRAVENOUS at 13:29

## 2020-01-01 RX ADMIN — LEVETIRACETAM 750 MG: 500 INJECTION, SOLUTION INTRAVENOUS at 08:39

## 2020-01-01 RX ADMIN — HYDRALAZINE HYDROCHLORIDE 50 MG: 50 TABLET, FILM COATED ORAL at 05:40

## 2020-01-01 RX ADMIN — FENTANYL CITRATE 75 MCG: 50 INJECTION INTRAMUSCULAR; INTRAVENOUS at 06:30

## 2020-01-01 RX ADMIN — GLYCOPYRROLATE 0.2 MG: 0.2 INJECTION, SOLUTION INTRAMUSCULAR; INTRAVITREAL at 15:59

## 2020-01-01 RX ADMIN — PROPOFOL 20 MCG/KG/MIN: 10 INJECTION, EMULSION INTRAVENOUS at 07:49

## 2020-01-01 RX ADMIN — FENTANYL CITRATE 75 MCG: 50 INJECTION INTRAMUSCULAR; INTRAVENOUS at 02:03

## 2020-01-01 RX ADMIN — FENTANYL CITRATE 75 MCG: 50 INJECTION INTRAMUSCULAR; INTRAVENOUS at 13:10

## 2020-01-01 RX ADMIN — HEPARIN SODIUM 14.4 UNITS/KG/HR: 10000 INJECTION, SOLUTION INTRAVENOUS at 16:17

## 2020-01-01 RX ADMIN — PROPOFOL 45 MCG/KG/MIN: 10 INJECTION, EMULSION INTRAVENOUS at 03:30

## 2020-01-01 RX ADMIN — ACETAMINOPHEN 650 MG: 650 SUPPOSITORY RECTAL at 08:37

## 2020-01-01 RX ADMIN — PROPOFOL 10 MCG/KG/MIN: 10 INJECTION, EMULSION INTRAVENOUS at 13:09

## 2020-01-01 RX ADMIN — CHLORHEXIDINE GLUCONATE 15 ML: 1.2 RINSE ORAL at 20:30

## 2020-01-01 RX ADMIN — ALBUTEROL SULFATE 6 PUFF: 90 AEROSOL, METERED RESPIRATORY (INHALATION) at 11:22

## 2020-01-01 RX ADMIN — LABETALOL HYDROCHLORIDE 20 MG: 5 INJECTION, SOLUTION INTRAVENOUS at 12:22

## 2020-01-01 RX ADMIN — FENTANYL CITRATE 75 MCG: 50 INJECTION INTRAMUSCULAR; INTRAVENOUS at 08:06

## 2020-01-01 RX ADMIN — SODIUM CHLORIDE 10 MG/HR: 9 INJECTION, SOLUTION INTRAVENOUS at 15:29

## 2020-01-01 RX ADMIN — SODIUM CHLORIDE, PRESERVATIVE FREE 10 ML: 5 INJECTION INTRAVENOUS at 10:12

## 2020-01-01 RX ADMIN — AMLODIPINE BESYLATE 10 MG: 10 TABLET ORAL at 08:26

## 2020-01-01 RX ADMIN — SODIUM CHLORIDE 12.5 MG/HR: 9 INJECTION, SOLUTION INTRAVENOUS at 23:36

## 2020-01-01 RX ADMIN — FAMOTIDINE 20 MG: 10 INJECTION INTRAVENOUS at 20:27

## 2020-01-01 RX ADMIN — LORAZEPAM 1 MG: 2 INJECTION INTRAMUSCULAR; INTRAVENOUS at 13:33

## 2020-01-01 RX ADMIN — FENTANYL CITRATE 75 MCG: 50 INJECTION INTRAMUSCULAR; INTRAVENOUS at 00:17

## 2020-01-01 RX ADMIN — FENTANYL CITRATE 75 MCG: 50 INJECTION INTRAMUSCULAR; INTRAVENOUS at 13:05

## 2020-01-01 RX ADMIN — SODIUM CHLORIDE, PRESERVATIVE FREE 10 ML: 5 INJECTION INTRAVENOUS at 20:44

## 2020-01-01 RX ADMIN — POTASSIUM CHLORIDE 40 MEQ: 1.5 POWDER, FOR SOLUTION ORAL at 12:11

## 2020-01-01 RX ADMIN — APIXABAN 10 MG: 5 TABLET, FILM COATED ORAL at 08:51

## 2020-01-01 RX ADMIN — FUROSEMIDE 40 MG: 10 INJECTION, SOLUTION INTRAMUSCULAR; INTRAVENOUS at 16:51

## 2020-01-01 RX ADMIN — LEVETIRACETAM 1000 MG: 10 INJECTION INTRAVENOUS at 20:14

## 2020-01-01 RX ADMIN — SODIUM CHLORIDE 10 MG/HR: 9 INJECTION, SOLUTION INTRAVENOUS at 18:25

## 2020-01-01 RX ADMIN — FAMOTIDINE 20 MG: 20 TABLET, FILM COATED ORAL at 05:44

## 2020-01-01 RX ADMIN — SODIUM CHLORIDE 7.5 MG/HR: 9 INJECTION, SOLUTION INTRAVENOUS at 06:40

## 2020-01-01 RX ADMIN — MANNITOL 50 G: 20 INJECTION, SOLUTION INTRAVENOUS at 15:22

## 2020-01-01 RX ADMIN — SODIUM CHLORIDE, PRESERVATIVE FREE 10 ML: 5 INJECTION INTRAVENOUS at 08:03

## 2020-01-01 RX ADMIN — SODIUM CHLORIDE 100 ML/HR: 9 INJECTION, SOLUTION INTRAVENOUS at 07:49

## 2020-01-01 RX ADMIN — TECHNETIUM TC 99M SULFUR COLLOID 1 DOSE: KIT at 07:30

## 2020-01-01 RX ADMIN — CHLORHEXIDINE GLUCONATE 15 ML: 1.2 RINSE ORAL at 08:09

## 2020-01-01 RX ADMIN — SODIUM CHLORIDE 12.5 MG/HR: 9 INJECTION, SOLUTION INTRAVENOUS at 01:25

## 2020-01-01 RX ADMIN — MORPHINE SULFATE 6 MG: 10 INJECTION, SOLUTION INTRAMUSCULAR; INTRAVENOUS at 16:59

## 2020-01-01 RX ADMIN — PROPOFOL 25 MCG/KG/MIN: 10 INJECTION, EMULSION INTRAVENOUS at 23:47

## 2020-01-01 RX ADMIN — SODIUM CHLORIDE 5 MG/HR: 9 INJECTION, SOLUTION INTRAVENOUS at 02:12

## 2020-01-01 RX ADMIN — PROPOFOL 50 MCG/KG/MIN: 10 INJECTION, EMULSION INTRAVENOUS at 02:57

## 2020-01-01 RX ADMIN — HEPARIN SODIUM 8300 UNITS: 5000 INJECTION INTRAVENOUS; SUBCUTANEOUS at 16:15

## 2020-01-01 RX ADMIN — SODIUM CHLORIDE 12.5 MG/HR: 9 INJECTION, SOLUTION INTRAVENOUS at 12:38

## 2020-01-01 RX ADMIN — FAMOTIDINE 20 MG: 10 INJECTION INTRAVENOUS at 21:30

## 2020-01-01 RX ADMIN — HYDROCODONE BITARTRATE AND ACETAMINOPHEN 1 TABLET: 5; 325 TABLET ORAL at 05:38

## 2020-01-01 RX ADMIN — SODIUM CHLORIDE, PRESERVATIVE FREE 10 ML: 5 INJECTION INTRAVENOUS at 21:04

## 2020-01-01 RX ADMIN — SODIUM CHLORIDE 15 MG/HR: 9 INJECTION, SOLUTION INTRAVENOUS at 09:14

## 2020-01-01 RX ADMIN — HEPARIN SODIUM 5000 UNITS: 5000 INJECTION INTRAVENOUS; SUBCUTANEOUS at 14:59

## 2020-01-01 RX ADMIN — ALBUTEROL SULFATE 6 PUFF: 90 AEROSOL, METERED RESPIRATORY (INHALATION) at 06:56

## 2020-01-01 RX ADMIN — LABETALOL HYDROCHLORIDE 20 MG: 5 INJECTION, SOLUTION INTRAVENOUS at 15:38

## 2020-01-01 RX ADMIN — HYDROCODONE BITARTRATE AND ACETAMINOPHEN 1 TABLET: 5; 325 TABLET ORAL at 18:17

## 2020-01-01 RX ADMIN — SODIUM CHLORIDE 15 MG/HR: 9 INJECTION, SOLUTION INTRAVENOUS at 03:54

## 2020-01-01 RX ADMIN — LEVETIRACETAM 750 MG: 500 INJECTION, SOLUTION INTRAVENOUS at 08:28

## 2020-01-01 RX ADMIN — SODIUM CHLORIDE, PRESERVATIVE FREE 10 ML: 5 INJECTION INTRAVENOUS at 20:48

## 2020-01-01 RX ADMIN — LORAZEPAM 1 MG: 2 INJECTION INTRAMUSCULAR; INTRAVENOUS at 16:25

## 2020-01-01 RX ADMIN — MORPHINE SULFATE 4 MG: 2 INJECTION, SOLUTION INTRAMUSCULAR; INTRAVENOUS at 15:42

## 2020-01-01 RX ADMIN — FAMOTIDINE 20 MG: 10 INJECTION INTRAVENOUS at 20:51

## 2020-01-01 RX ADMIN — SODIUM CHLORIDE 15 MG/HR: 9 INJECTION, SOLUTION INTRAVENOUS at 22:26

## 2020-01-01 RX ADMIN — ALBUTEROL SULFATE 6 PUFF: 90 AEROSOL, METERED RESPIRATORY (INHALATION) at 06:37

## 2020-01-01 RX ADMIN — HYDROCODONE BITARTRATE AND ACETAMINOPHEN 1 TABLET: 5; 325 TABLET ORAL at 00:55

## 2020-01-01 RX ADMIN — ALBUTEROL SULFATE 6 PUFF: 90 AEROSOL, METERED RESPIRATORY (INHALATION) at 14:07

## 2020-01-01 RX ADMIN — APIXABAN 10 MG: 5 TABLET, FILM COATED ORAL at 11:46

## 2020-01-01 RX ADMIN — FAMOTIDINE 20 MG: 10 INJECTION INTRAVENOUS at 08:05

## 2020-01-01 RX ADMIN — CHLORHEXIDINE GLUCONATE 15 ML: 1.2 RINSE ORAL at 13:23

## 2020-01-01 RX ADMIN — SODIUM CHLORIDE 15 MG/HR: 9 INJECTION, SOLUTION INTRAVENOUS at 15:52

## 2020-01-01 RX ADMIN — APIXABAN 10 MG: 5 TABLET, FILM COATED ORAL at 20:31

## 2020-01-01 RX ADMIN — SODIUM CHLORIDE, PRESERVATIVE FREE 10 ML: 5 INJECTION INTRAVENOUS at 20:47

## 2020-01-01 RX ADMIN — LEVETIRACETAM 1000 MG: 10 INJECTION INTRAVENOUS at 20:44

## 2020-01-01 RX ADMIN — ALBUTEROL SULFATE 6 PUFF: 90 AEROSOL, METERED RESPIRATORY (INHALATION) at 10:21

## 2020-01-01 RX ADMIN — AMLODIPINE BESYLATE 10 MG: 10 TABLET ORAL at 08:51

## 2020-01-01 RX ADMIN — LEVETIRACETAM 1000 MG: 10 INJECTION INTRAVENOUS at 13:21

## 2020-01-01 RX ADMIN — APIXABAN 10 MG: 5 TABLET, FILM COATED ORAL at 20:51

## 2020-01-01 RX ADMIN — SODIUM CHLORIDE 15 MG/HR: 9 INJECTION, SOLUTION INTRAVENOUS at 13:14

## 2020-01-01 RX ADMIN — LORAZEPAM 3 MG: 2 INJECTION INTRAMUSCULAR; INTRAVENOUS at 15:15

## 2020-01-01 RX ADMIN — LEVETIRACETAM 1000 MG: 10 INJECTION INTRAVENOUS at 02:12

## 2020-01-01 RX ADMIN — AMPICILLIN SODIUM AND SULBACTAM SODIUM 3 G: 2; 1 INJECTION, POWDER, FOR SOLUTION INTRAMUSCULAR; INTRAVENOUS at 20:28

## 2020-01-01 RX ADMIN — HYDROCODONE BITARTRATE AND ACETAMINOPHEN 1 TABLET: 5; 325 TABLET ORAL at 20:38

## 2020-01-01 RX ADMIN — PROPOFOL 35 MCG/KG/MIN: 10 INJECTION, EMULSION INTRAVENOUS at 11:29

## 2020-01-01 RX ADMIN — SODIUM CHLORIDE, PRESERVATIVE FREE 10 ML: 5 INJECTION INTRAVENOUS at 08:56

## 2020-01-01 RX ADMIN — FAMOTIDINE 20 MG: 10 INJECTION INTRAVENOUS at 08:07

## 2020-01-01 RX ADMIN — ALTEPLASE 1 MG/HR: KIT at 18:26

## 2020-01-01 RX ADMIN — ETOMIDATE 30 MG: 2 INJECTION INTRAVENOUS at 12:54

## 2020-01-01 RX ADMIN — AMPICILLIN SODIUM AND SULBACTAM SODIUM 3 G: 2; 1 INJECTION, POWDER, FOR SOLUTION INTRAMUSCULAR; INTRAVENOUS at 08:39

## 2020-01-01 RX ADMIN — ALBUTEROL SULFATE 6 PUFF: 90 AEROSOL, METERED RESPIRATORY (INHALATION) at 15:14

## 2020-01-01 RX ADMIN — LOSARTAN POTASSIUM: 50 TABLET, FILM COATED ORAL at 08:51

## 2020-01-01 RX ADMIN — FENTANYL CITRATE 75 MCG: 50 INJECTION INTRAMUSCULAR; INTRAVENOUS at 08:33

## 2020-01-01 RX ADMIN — MANNITOL 50 G: 20 INJECTION, SOLUTION INTRAVENOUS at 02:42

## 2020-01-01 RX ADMIN — SODIUM CHLORIDE, PRESERVATIVE FREE 10 ML: 5 INJECTION INTRAVENOUS at 20:58

## 2020-01-01 RX ADMIN — FENTANYL CITRATE 75 MCG: 50 INJECTION INTRAMUSCULAR; INTRAVENOUS at 03:01

## 2020-01-01 RX ADMIN — IOPAMIDOL 100 ML: 755 INJECTION, SOLUTION INTRAVENOUS at 14:45

## 2020-01-01 RX ADMIN — LOSARTAN POTASSIUM: 50 TABLET, FILM COATED ORAL at 22:38

## 2020-01-01 RX ADMIN — LEVETIRACETAM 1000 MG: 10 INJECTION INTRAVENOUS at 13:37

## 2020-01-01 RX ADMIN — FENTANYL CITRATE 75 MCG: 50 INJECTION INTRAMUSCULAR; INTRAVENOUS at 05:59

## 2020-01-01 RX ADMIN — FENTANYL CITRATE 75 MCG: 50 INJECTION INTRAMUSCULAR; INTRAVENOUS at 11:58

## 2020-01-01 RX ADMIN — PROPOFOL 20 MCG/KG/MIN: 10 INJECTION, EMULSION INTRAVENOUS at 15:29

## 2020-01-01 RX ADMIN — FENTANYL CITRATE 75 MCG: 50 INJECTION INTRAMUSCULAR; INTRAVENOUS at 03:52

## 2020-01-01 RX ADMIN — CHLORHEXIDINE GLUCONATE 15 ML: 1.2 RINSE ORAL at 20:49

## 2020-02-26 NOTE — PATIENT INSTRUCTIONS
1. For further evaluation of nausea, vomiting, and diarrhea we will schedule an EGD.    2. For further evaluation of diarrhea and for colon cancer screening we will schedule a colonoscopy.    3. Plan for office follow up in 4 weeks after procedure with NP for follow up.

## 2020-02-26 NOTE — PROGRESS NOTES
"Diarrhea and Nausea      HPI  64-year old female presents to the office with reports of nausea, fatigue, and diarrhea.    She reports constant nausea and has had some dry heaving in the past. She avoids eating at times, but has not lost any weight. The urge to vomit is present first thing in the morning and multiple times throughout the day. She takes Tramadol daily and Ibuprofen daily for the past 2 weeks for her back pain. She has taken zofran in the past with some relief. Symptoms seemed to coincide with a recurrent sciatic nerve issue. She denies any history of pancreatitis or liver problems. She reports early satiety. She underwent a gastric emptying study on 2/3/2019 that was normal. She reports significant fatigue. She has had a cholecystectomy.     She reports mild diarrhea. Diarrhea does not occur daily. She denies borborygmi. She denies any bloating or distention. She reports \"just not feeling right\" in her periumbilical area. She denies any abdominal pain. She denies fever. She denies any family history of colon cancer. She reports her last colonoscopy was about 10 yrs ago. She denies any melena or hematochezia.     Documentation by Galina MCALLISTER acting as a scribe in the following sections (HPI, Assessment, Plan) for the undersigned provider.       Review of Systems   Constitutional: Positive for appetite change. Negative for chills, diaphoresis, fatigue, fever and unexpected weight change.        Night sweats    HENT: Negative for congestion, sore throat, trouble swallowing and voice change.    Eyes: Positive for visual disturbance.   Respiratory: Negative for cough, shortness of breath and wheezing.    Cardiovascular: Positive for palpitations. Negative for chest pain and leg swelling.   Gastrointestinal: Negative for abdominal distention, abdominal pain, anal bleeding, blood in stool, constipation, diarrhea, nausea, rectal pain and vomiting.   Endocrine: Positive for heat intolerance. "   Musculoskeletal: Positive for back pain. Negative for gait problem.   Skin: Negative for color change, pallor and rash.   Neurological: Negative for dizziness and light-headedness.   Psychiatric/Behavioral: Positive for sleep disturbance. Negative for agitation, behavioral problems and confusion. The patient is nervous/anxious.         I have reviewed and confirmed the accuracy of the ROS as documented by the MA/LPN/RN Moisés Christine MD     Problem List:  There is no problem list on file for this patient.      Medical History:    Past Medical History:   Diagnosis Date   • Disease of thyroid gland    • Fibromyalgia    • Hypertension         Social History:    Social History     Socioeconomic History   • Marital status:      Spouse name: Not on file   • Number of children: Not on file   • Years of education: Not on file   • Highest education level: Not on file   Tobacco Use   • Smoking status: Never Smoker   • Smokeless tobacco: Never Used   Substance and Sexual Activity   • Alcohol use: No   • Drug use: No   • Sexual activity: Defer       Family History:   Family History   Problem Relation Age of Onset   • Cancer Neg Hx        Surgical History:   Past Surgical History:   Procedure Laterality Date   • APPENDECTOMY     • CHOLECYSTECTOMY     • COLONOSCOPY     • KNEE SURGERY Right          Current Outpatient Medications:   •  amLODIPine (NORVASC) 10 MG tablet, Take 10 mg by mouth Daily., Disp: , Rfl:   •  losartan-hydrochlorothiazide (HYZAAR) 100-25 MG per tablet, , Disp: , Rfl:   •  traMADol-acetaminophen (ULTRACET) 37.5-325 MG per tablet, , Disp: , Rfl:     Allergies:   Allergies   Allergen Reactions   • Phenergan [Promethazine Hcl] Anxiety        The following portions of the patient's history were reviewed and updated as appropriate: allergies, current medications, past family history, past medical history, past social history, past surgical history and problem list.    Vitals:    02/26/20 1250   BP:  140/88   Pulse: 101   Temp: 98.1 °F (36.7 °C)   SpO2: 97%         02/26/20  1250   Weight: 105 kg (231 lb)     Body mass index is 43.67 kg/m².      PHYSICAL EXAM:    CONSTITUTIONAL:  today's vital signs reviewed by me  EARS NOSE THROAT: trachea midline and no deformity of the nares  EYES: no scleral icterus  GASTROINTESTINAL: abdomen is soft nontender nondistended with normal active bowel sounds, no masses are appreciated  PSYCHIATRIC: appropriate mood and affect  RESPIRATORY: normal inspiratory effort with no increased work of breathing  NEUROLOGIC: patient is awake and alert  DERMATOLOGIC: skin is warm with no cyanosis  LYMPHATIC: no periumbilical lymphadenopathy    Assessment/ Plan  Afia was seen today for diarrhea and nausea.    Diagnoses and all orders for this visit:    Nausea and vomiting in adult    Early satiety    Diarrhea, unspecified type    Colon cancer screening         Return for Return to the clinic with the nurse practitioner in 4 weeks.      Discussion:  Patient here with worsening diarrhea as well as nausea and reflux.  We will proceed with an EGD for further evaluation of the worsening nausea and GERD.  We also proceed with colonoscopy for screening but also to assess the diarrhea.    Note: Review and summarization of old patient records in this note have been personally reviewed by me  And   Refer to After Visit Summary for details of patient counseling, education and instructions provided during the encounter

## 2020-07-09 PROBLEM — I26.99 PULMONARY EMBOLISM, BILATERAL (HCC): Status: ACTIVE | Noted: 2020-01-01

## 2020-07-09 NOTE — ED PROVIDER NOTES
Pt presents to the ED c/o  acute on chronic chest pains that worsened today while she was walking, pain is more severe and associated with lightheadedness and shortness of breath which is unusual for her pains.  Pain radiates to both shoulders, no pleuritic nature to it. She has been short of breath the last couple of days with some intermittent left calf tenderness as well.  Denies any recent travel or recent surgeries.  Denies any fevers, chills, cough.  Denies any edema.  Pain currently is somewhat improved.  Patient noted to be mildly hypoxic on arrival to 87% on room air.     On exam,   General: Awake, alert, no acute distress, nontoxic, nondiaphoretic  HEENT: Mucous membranes moist, atraumatic, normocephalic, EOMI  Neck: Full ROM  Pulm: Symmetric, nonlabored, lungs CTAB  Cardiovascular: Mild regular rhythm tachycardia, normal S1/S2, intact distal pulses, no peripheral edema, no calf tenderness  GI: Soft, nontender, nondistended, no rebound, no guarding, bowel sounds present  MSK: Full ROM, no deformity, reproducible sternal chest pain to palpation  Skin: Warm, dry  Neuro: Alert and oriented x 3, GCS 15, moving all extremities, no focal deficits  Psych: Calm, cooperative      Surgical mask and gloves used during this encounter. Patient in surgical mask.    EKG    EKG Time: 1300  Rhythm/Rate: Sinus tachycardia rate of 103  Normal axis  Normal intervals  Borderline T wave abnormalities in the anterior leads with new T wave inversion lead V2 as compared to June 2019, S1Q3T3  No STEMI     Interpreted Contemporaneously by me, independently viewed    Critical Care  Performed by: Jaime Terrell MD  Authorized by: Alejandra Bright MD     Critical care provider statement:     Critical care time (minutes):  34    Critical care time was exclusive of:  Separately billable procedures and treating other patients    Critical care was necessary to treat or prevent imminent or life-threatening deterioration of the following  conditions:  Cardiac failure and circulatory failure    Critical care was time spent personally by me on the following activities:  Development of treatment plan with patient or surrogate, discussions with consultants, evaluation of patient's response to treatment, examination of patient, interpretation of cardiac output measurements, obtaining history from patient or surrogate, ordering and performing treatments and interventions, ordering and review of laboratory studies, ordering and review of radiographic studies, pulse oximetry, re-evaluation of patient's condition and review of old charts            Plan:   ED Course as of Jul 09 1957   Thu Jul 09, 2020   6136 Discussed case with Dr. Mendoza with interventional cardiology, they will evaluate patient at bedside    [DC]      ED Course User Index  [DC] Jaime Terrell MD     Patient came today for evaluation for chest pain shortness of breath, happened suddenly and was a little bit different from her typical chronic intermittent chest pains.  Evaluation for ACS, pneumothorax, pneumonia though I feel these are unlikely.  CT angiogram of the chest was obtained to eval for possible PE due to reports of left lower extremity pain recently.  Denies any other significant risk factors with no surgeries or recent travels.  Patient did come in mildly hypoxic to 87% which increased my concern for possible PE.  CT scan shows bilateral PEs with evidence of right ventricular strain, also some evidence of right ventricular strain on EKG.  Patient without hypotension but due to her tachycardia, hypoxia, and evidence of strain on CT scan and EKG, interventional cardiology was consulted who saw her at bedside and will be taken her for intervention.  She was started on heparin in the emergency department.     Diagnosis Plan   1. Pulmonary embolism, bilateral (CMS/HCC)  Case Request Cath Lab: Ekos catheter placement    Case Request Cath Lab: Ekos catheter placement    Invasive  peripheral vascular study    Invasive peripheral vascular study          Attestation:  The BARB and I have discussed this patient's history, physical exam, and treatment plan.  I have reviewed the documentation and personally had a face to face interaction with the patient. I affirm the documentation and agree with the treatment and plan.  The attached note describes my personal findings.            Jaime Terrell MD  07/09/20 1959

## 2020-07-09 NOTE — H&P
Patient Name: Afia Bernstein  :1955  64 y.o.    Date of Admission: 2020  Date of Consultation:  20  Encounter Provider: ESVIN Us  Place of Service: [facility]  Referring Provider: No ref. provider found  Patient Care Team:  Jorge Abel MD as PCP - General (Family Medicine)  Moisés Christine MD as Consulting Physician (Gastroenterology)      Chief complaint: shortness of breath    History of Present Illness: Mrs. Bernstein is a 64 year old female with history of hypertension, obesity, chronic back pain with epidural injections, and fibromyalgia. Over the last few days patient has noted a pain in her left calf. This morning she had a sudden onset of chest pain and severe shortness of breath. She presented to the emergency room and was found to have large bilateral pulmonary emboli. She is tachycardic. Trop elevated. CT showed evidence of RV dilatation. She was started on a heparin drip.  Lower extremity doppler showed acute left DVT in the popliteal. She is hypoxic and on 2 liters nasal canula.    She has no recent history of travel. She has been more sedentary recently due to back pain as well as not doing normal activities during the pandemic. She has no history of malignancy. She prior history of bleeding or clotting in herself or family members.     Past Medical History:   Diagnosis Date   • Disease of thyroid gland    • Fibromyalgia    • Hypertension        Past Surgical History:   Procedure Laterality Date   • APPENDECTOMY     • CHOLECYSTECTOMY     • COLONOSCOPY     • KNEE SURGERY Right          Prior to Admission medications    Medication Sig Start Date End Date Taking? Authorizing Provider   amLODIPine (NORVASC) 10 MG tablet Take 10 mg by mouth Daily.    Ruddy Hebert MD   losartan-hydrochlorothiazide (HYZAAR) 100-25 MG per tablet  20   Ruddy Hebert MD   traMADol-acetaminophen (ULTRACET) 37.5-325 MG per  tablet  2/21/20   Provider, MD Ruddy       Allergies   Allergen Reactions   • Phenergan [Promethazine Hcl] Anxiety       Social History     Socioeconomic History   • Marital status:      Spouse name: Not on file   • Number of children: Not on file   • Years of education: Not on file   • Highest education level: Not on file   Tobacco Use   • Smoking status: Never Smoker   • Smokeless tobacco: Never Used   Substance and Sexual Activity   • Alcohol use: No   • Drug use: No   • Sexual activity: Defer       Family History   Problem Relation Age of Onset   • Cancer Neg Hx        REVIEW OF SYSTEMS:   All systems reviewed.  Pertinent positives identified in HPI.  All other systems are negative.      Objective:     Vitals:    07/09/20 1318 07/09/20 1335 07/09/20 1348 07/09/20 1620   BP: 153/97 (!) 153/102  (!) 174/102   Pulse:  91 106    Resp:       Temp:       TempSrc:       SpO2:  96% 96% 94%   Weight:       Height:         Body mass index is 43.46 kg/m².    Physical Exam:  Constitutional: She is oriented to person, place, and time. She is in mild acute distress.   HENT:   Head: Normocephalic and atraumatic. Head is without contusion.   Right Ear: Hearing normal. No drainage.   Left Ear: Hearing normal. No drainage.   Nose: No nasal deformity. No epistaxis.   Eyes: Lids are normal. Right eye exhibits no exudate. Left eye exhibits no exudate.  Neck: No JVD present. Carotid bruit is not present. No tracheal deviation present. No thyroid mass and no thyromegaly present.   Cardiovascular: Normal rate, regular rhythm and normal heart sounds.    Pulses:       Posterior tibial pulses are 2+ on the right side, and 2+ on the left side.   Pulmonary/Chest: Effort normal and breath sounds normal.   Abdominal: Soft. Normal appearance and bowel sounds are normal. There is no tenderness.   Musculoskeletal: Normal range of motion.        Right shoulder: She exhibits no deformity.        Left shoulder: She exhibits no  deformity.   Neurological: She is alert and oriented to person, place, and time. She has normal strength.   Skin: Skin is warm, dry and intact. No rash noted.   Psychiatric: she appears anxious. Her behavior is normal. Thought content normal.   Vitals reviewed      Lab Review:     Results from last 7 days   Lab Units 07/09/20  1319   SODIUM mmol/L 140   POTASSIUM mmol/L 4.1   CHLORIDE mmol/L 103   CO2 mmol/L 25.4   BUN mg/dL 33*   CREATININE mg/dL 1.51*   CALCIUM mg/dL 11.0*   BILIRUBIN mg/dL 0.5   ALK PHOS U/L 67   ALT (SGPT) U/L 17   AST (SGOT) U/L 15   GLUCOSE mg/dL 105*     Results from last 7 days   Lab Units 07/09/20  1319   TROPONIN T ng/mL 0.067*     Results from last 7 days   Lab Units 07/09/20  1319   WBC 10*3/mm3 7.81   HEMOGLOBIN g/dL 13.9   HEMATOCRIT % 42.3   PLATELETS 10*3/mm3 203     Results from last 7 days   Lab Units 07/09/20  1319   INR  0.93   APTT seconds 24.8                              Current Facility-Administered Medications:   •  heparin (porcine) 5000 UNIT/ML injection 4,200-8,300 Units, 40-80 Units/kg, Intravenous, Q6H PRN, Shelli Moody PA-C  •  heparin 46999 units/250 mL (100 units/mL) in 0.45 % NaCl infusion, 14.4 Units/kg/hr, Intravenous, Titrated, Shelli Moody PA-C, Last Rate: 14.97 mL/hr at 07/09/20 1617, 14.4 Units/kg/hr at 07/09/20 1617  •  sodium chloride 0.9 % bolus 500 mL, 500 mL, Intravenous, Once, Shelli Moody PA-C, Last Rate: 1,000 mL/hr at 07/09/20 1612, 500 mL at 07/09/20 1612    Current Outpatient Medications:   •  amLODIPine (NORVASC) 10 MG tablet, Take 10 mg by mouth Daily., Disp: , Rfl:   •  losartan-hydrochlorothiazide (HYZAAR) 100-25 MG per tablet, , Disp: , Rfl:   •  traMADol-acetaminophen (ULTRACET) 37.5-325 MG per tablet, , Disp: , Rfl:     Assessment and Plan:       1. Submassive pulmonary embolus with high risk features. She is tachycardic, hypoxic, elevated troponin, and increased RV:LV ratio. She has been placed on IV heparin. Likely due to  sedentary lifestyle and obesity. No history of malignancy, recent surgery, hormone replacement, or travel.     2. Acute left lower extremity DVT    3. Morbid obesity    5. Hypertension - continue home antihypertensives.    Dr. Mendoza and I saw the patient in the emergency room. She was getting a STAT bedside echo which showed no evidence of clot in transit. And moderate RV dilation. We will proceed with bilateral EKOS catheter placement with local TPA. She will be monitored closely in the CCU.     Sonia Wakefield, ESVIN  07/09/20  16:39

## 2020-07-09 NOTE — ED PROVIDER NOTES
EMERGENCY DEPARTMENT ENCOUNTER    Room Number:  N336/1  Date seen:  7/9/2020  Time seen: 2:04 PM  PCP: Jorge Abel MD  Historian: Pt    HPI:  Chief complaint: Chest pain  A complete HPI/ROS/PMH/PSH/SH/FH are unobtainable due to: Nothing   Context:Afia Bernstein is a 64 y.o. female, hx of HTN, obesity, chronic back pain, who presents to the ED with c/o walking at 11:30 AM today when worsening chest pain and sudden shortness of breath started.  She says that she has a history of chest pain but chest pain at that time 11:30 is worse than usual.  Patient denies any personal history coronary artery disease, family history of heart disease, recent travel, prolonged immobilization, nor history of blood clots.  She is complaining of left calf tenderness which started 3 days ago.At baseline, pt does not use oxygen at home. Denies any cough, LE swelling, fever, nausea, vomiting.    Timing: Sudden  Duration: Intermittent for 4 hours  Location: Central chest  Radiation: Bilateral upper extremities  Quality: Dull  Associated Symptoms: L calf tenderness  Aggravating Factors: Ambulation  Alleviating Factors:Rest  Previous Episodes:None  Treatment before arrival:None    Patient was placed in face mask in first look. Patient was wearing facemask when I entered the room and throughout our encounter. I wore full protective equipment throughout this patient encounter including a face mask, and gloves. Hand hygiene was performed before donning protective equipment and after removal when leaving the room.      MEDICAL RECORD REVIEW  Pt was seen here 6/2019 for otitis media.    ALLERGIES  Phenergan [promethazine hcl]    PAST MEDICAL HISTORY  Active Ambulatory Problems     Diagnosis Date Noted   • No Active Ambulatory Problems     Resolved Ambulatory Problems     Diagnosis Date Noted   • No Resolved Ambulatory Problems     Past Medical History:   Diagnosis Date   • Disease of thyroid gland    • Fibromyalgia    • Hypertension         PAST SURGICAL HISTORY  Past Surgical History:   Procedure Laterality Date   • APPENDECTOMY     • CHOLECYSTECTOMY     • COLONOSCOPY     • KNEE SURGERY Right        FAMILY HISTORY  Family History   Problem Relation Age of Onset   • Cancer Neg Hx        SOCIAL HISTORY  Social History     Socioeconomic History   • Marital status:      Spouse name: Not on file   • Number of children: Not on file   • Years of education: Not on file   • Highest education level: Not on file   Tobacco Use   • Smoking status: Never Smoker   • Smokeless tobacco: Never Used   Substance and Sexual Activity   • Alcohol use: No   • Drug use: No   • Sexual activity: Defer       REVIEW OF SYSTEMS  Review of Systems    All systems reviewed and negative except for those discussed in HPI.     PHYSICAL EXAM    ED Triage Vitals   Temp Heart Rate Resp BP SpO2   07/09/20 1253 07/09/20 1254 07/09/20 1253 07/09/20 1318 07/09/20 1253   98.7 °F (37.1 °C) 104 20 153/97 90 %      Temp src Heart Rate Source Patient Position BP Location FiO2 (%)   07/09/20 1253 07/09/20 1254 -- -- --   Tympanic Monitor        Physical Exam    I have reviewed the triage vital signs and nursing notes.      GENERAL: not distressed  HENT: nares patent  EYES: no scleral icterus  NECK: no ROM limitations  CV: regular rhythm, regular rate; non reproducible chest tendenress  RESPIRATORY: Appears short of breath; 2L of oxygen NC, decreased breath sounds throughout  MUSCULOSKELETAL: no deformity; mild L calf tenderness  NEURO: alert, moves all extremities, follows commands  SKIN: warm, dry    LAB RESULTS  Recent Results (from the past 24 hour(s))   Light Blue Top    Collection Time: 07/09/20  1:19 PM   Result Value Ref Range    Extra Tube hold for add-on    Green Top (Gel)    Collection Time: 07/09/20  1:19 PM   Result Value Ref Range    Extra Tube Hold for add-ons.    Lavender Top    Collection Time: 07/09/20  1:19 PM   Result Value Ref Range    Extra Tube hold for add-on     Gold Top - SST    Collection Time: 07/09/20  1:19 PM   Result Value Ref Range    Extra Tube Hold for add-ons.    Comprehensive Metabolic Panel    Collection Time: 07/09/20  1:19 PM   Result Value Ref Range    Glucose 105 (H) 65 - 99 mg/dL    BUN 33 (H) 8 - 23 mg/dL    Creatinine 1.51 (H) 0.57 - 1.00 mg/dL    Sodium 140 136 - 145 mmol/L    Potassium 4.1 3.5 - 5.2 mmol/L    Chloride 103 98 - 107 mmol/L    CO2 25.4 22.0 - 29.0 mmol/L    Calcium 11.0 (H) 8.6 - 10.5 mg/dL    Total Protein 8.2 6.0 - 8.5 g/dL    Albumin 3.90 3.50 - 5.20 g/dL    ALT (SGPT) 17 1 - 33 U/L    AST (SGOT) 15 1 - 32 U/L    Alkaline Phosphatase 67 39 - 117 U/L    Total Bilirubin 0.5 0.0 - 1.2 mg/dL    eGFR Non African Amer 35 (L) >60 mL/min/1.73    Globulin 4.3 gm/dL    A/G Ratio 0.9 g/dL    BUN/Creatinine Ratio 21.9 7.0 - 25.0    Anion Gap 11.6 5.0 - 15.0 mmol/L   Troponin    Collection Time: 07/09/20  1:19 PM   Result Value Ref Range    Troponin T 0.067 (C) 0.000 - 0.030 ng/mL   BNP    Collection Time: 07/09/20  1:19 PM   Result Value Ref Range    proBNP 296.2 0.0 - 900.0 pg/mL   CBC Auto Differential    Collection Time: 07/09/20  1:19 PM   Result Value Ref Range    WBC 7.81 3.40 - 10.80 10*3/mm3    RBC 4.43 3.77 - 5.28 10*6/mm3    Hemoglobin 13.9 12.0 - 15.9 g/dL    Hematocrit 42.3 34.0 - 46.6 %    MCV 95.5 79.0 - 97.0 fL    MCH 31.4 26.6 - 33.0 pg    MCHC 32.9 31.5 - 35.7 g/dL    RDW 13.1 12.3 - 15.4 %    RDW-SD 46.7 37.0 - 54.0 fl    MPV 10.2 6.0 - 12.0 fL    Platelets 203 140 - 450 10*3/mm3    Neutrophil % 74.9 42.7 - 76.0 %    Lymphocyte % 15.4 (L) 19.6 - 45.3 %    Monocyte % 7.2 5.0 - 12.0 %    Eosinophil % 1.4 0.3 - 6.2 %    Basophil % 0.5 0.0 - 1.5 %    Immature Grans % 0.6 (H) 0.0 - 0.5 %    Neutrophils, Absolute 5.85 1.70 - 7.00 10*3/mm3    Lymphocytes, Absolute 1.20 0.70 - 3.10 10*3/mm3    Monocytes, Absolute 0.56 0.10 - 0.90 10*3/mm3    Eosinophils, Absolute 0.11 0.00 - 0.40 10*3/mm3    Basophils, Absolute 0.04 0.00 - 0.20 10*3/mm3     Immature Grans, Absolute 0.05 0.00 - 0.05 10*3/mm3    nRBC 0.1 0.0 - 0.2 /100 WBC   Protime-INR    Collection Time: 07/09/20  1:19 PM   Result Value Ref Range    Protime 12.4 11.7 - 14.2 Seconds    INR 0.93 0.90 - 1.10   aPTT    Collection Time: 07/09/20  1:19 PM   Result Value Ref Range    PTT 24.8 22.7 - 35.4 seconds   Duplex Venous Lower Extremity - Bilateral CAR    Collection Time: 07/09/20  4:16 PM   Result Value Ref Range    Left Popliteal Spont 1     Left Lesser Saph Vessel 1     Right Common Femoral Spont Y     Right Common Femoral Phasic Y     Right Common Femoral Augment Y     Right Common Femoral Competent Y     Right Common Femoral Compress C     Right Saphenofemoral Junction Compress C     Right Profunda Femoral Compress C     Right Proximal Femoral Compress C     Right Mid Femoral Spont Y     Right Mid Femoral Phasic Y     Right Mid Femoral Augment Y     Right Mid Femoral Competent Y     Right Mid Femoral Compress C     Right Distal Femoral Compress C     Right Popliteal Spont Y     Right Popliteal Phasic Y     Right Popliteal Augment Y     Right Popliteal Competent Y     Right Popliteal Compress C     Right Posterior Tibial Compress C     Right Peroneal Compress C     Right GastronemiusSoleal Compress C     Right Greater Saph AK Compress C     Right Greater Saph BK Compress C     Left Common Femoral Spont Y     Left Common Femoral Phasic Y     Left Common Femoral Augment Y     Left Common Femoral Competent Y     Left Common Femoral Compress C     Left Saphenofemoral Junction Compress C     Left Profunda Femoral Compress C     Left Proximal Femoral Compress C     Left Mid Femoral Spont Y     Left Mid Femoral Phasic Y     Left Mid Femoral Augment Y     Left Mid Femoral Competent Y     Left Mid Femoral Compress C     Left Distal Femoral Compress C     Left Popliteal Spont Y     Left Popliteal Phasic Y     Left Popliteal Compress P     Left Popliteal Thrombus A     Left Posterior Tibial Compress C      Left Peroneal Compress C     Left GastronemiusSoleal Compress C     Left Greater Saph AK Compress C     Left Greater Saph BK Compress C     Left Lesser Saph Compress N     Left Lesser Saph Thrombus A    COVID-19,[facility], NP SWAB IN TRANSPORT MEDIA 8-12 HR TAT - Swab, Nasopharynx    Collection Time: 07/09/20  4:20 PM   Result Value Ref Range    COVID19 Not Detected Not Detected - Ref. Range   Adult Transthoracic Echo Complete W/ Cont if Necessary Per Protocol    Collection Time: 07/09/20  4:58 PM   Result Value Ref Range    BSA 2.0 m^2    IVSd 1.0 cm    LVIDd 3.9 cm    LVIDs 2.5 cm    LVPWd 1.1 cm    IVS/LVPW 0.91     FS 35.9 %    EDV(Teich) 65.9 ml    ESV(Teich) 22.3 ml    EF(Teich) 66.1 %    EDV(cubed) 59.3 ml    ESV(cubed) 15.6 ml    EF(cubed) 73.7 %    LV mass(C)d 131.0 grams    LV mass(C)dI 65.7 grams/m^2    SV(Teich) 43.6 ml    SI(Teich) 21.9 ml/m^2    SV(cubed) 43.7 ml    SI(cubed) 21.9 ml/m^2    LVOT diam 2.1 cm    LVOT area 3.5 cm^2    LVOT area(traced) 3.5 cm^2    LVLd ap4 7.8 cm    EDV(MOD-sp4) 58.0 ml    LVLs ap4 4.8 cm    ESV(MOD-sp4) 13.0 ml    EF(MOD-sp4) 77.6 %    SV(MOD-sp4) 45.0 ml    SI(MOD-sp4) 22.6 ml/m^2    LV Zapata Vol (BSA corrected) 29.1 ml/m^2    LV Sys Vol (BSA corrected) 6.5 ml/m^2    Ao pk chris 159.0 cm/sec    Ao max PG 10.1 mmHg    Ao max PG (full) 5.3 mmHg    Ao V2 mean 95.6 cm/sec    Ao mean PG 4.0 mmHg    Ao mean PG (full) 2.0 mmHg    Ao V2 VTI 23.5 cm    JOSE(I,A) 2.8 cm^2    JOSE(I,D) 2.8 cm^2    JOSE(V,A) 2.4 cm^2    JOSE(V,D) 2.4 cm^2    LV V1 max PG 4.8 mmHg    LV V1 mean PG 2.0 mmHg    LV V1 max 110.0 cm/sec    LV V1 mean 73.4 cm/sec    LV V1 VTI 18.9 cm    SV(LVOT) 65.5 ml    SI(LVOT) 32.8 ml/m^2    PA acc time 0.05 sec    RV V1 max PG 2.5 mmHg    RV V1 mean PG 1.0 mmHg    RV V1 max 79.0 cm/sec    RV V1 mean 50.7 cm/sec    RV V1 VTI 16.2 cm    TR max chris 358.0 cm/sec    PA pr(Accel) 56.5 mmHg    RV Base 4.0 cm    RV Length 6.3 cm    RV Mid 4.5 cm     CV ECHO CORIN - BZI_BMI  43.9 kilograms/m^2     CV ECHO CORIN - BSA(HAYCOCK) 2.2 m^2     CV ECHO CORIN - BZI_METRIC_WEIGHT 104.0 kg     CV ECHO CORIN - BZI_METRIC_HEIGHT 154.0 cm    RVSP(TR) 57 mmHg    TR max PG 49 mmHg   POC Glucose Once    Collection Time: 07/09/20  7:14 PM   Result Value Ref Range    Glucose 107 70 - 130 mg/dL         RADIOLOGY RESULTS  CT Angiogram Chest   Final Result      XR Chest 1 View   Preliminary Result   1. No active disease is seen in the chest with no change when compared   to prior chest x-ray 06/18/2019.   2. There is chronic horizontal linear atelectasis or scarring left mid   to lower lung zone, and calcified granuloma in the right upper lobe.                PROGRESS, DATA ANALYSIS, CONSULTS AND MEDICAL DECISION MAKING  All labs have been independently reviewed by me.  All radiology studies have been reviewed by me and discussed with radiologist dictating the report. Discussion below represents my analysis of pertinent findings related to patient's condition, differential diagnosis, treatment plan and final disposition.     ED Course as of Jul 09 2032   Thu Jul 09, 2020   1536 Discussed case with Dr. Mendoza with interventional cardiology, they will evaluate patient at bedside    [DC]      ED Course User Index  [DC] Jaime Terrell MD       The differential diagnosis include but are not limited to pulmonary embolism, dvt, ACS, covid-19, PNA.    Please see Dr. Terrell's note regarding CRITICAL CARE TIME.       (IMAGING INTERPRETATION) CXR was viewed and interpreted myself which showed no areas of opacities or infiltrates.    Rechecked pt multiple times. Pt has been maintaining above 90% on 2L NC after coming back from vascular lab. Informed her plan for cardiac cath. She expresses understanding and all questions addressed at this time.        Reviewed pt's history and workup with Dr. Terrell.  After a bedside evaluation, Dr. Terrell agrees with the plan of care.    (FOR ADMIT) Based on the patient's lab findings  "and presenting symptoms, the doctor and I feel it is appropriate to admit the patient for further management, evaluation, and treatment.  I have discussed this with the admitting team.  I have also discussed this with the patient/family.  They are in agreement with admission.          Disposition vitals:  /98   Pulse 106   Temp 98.7 °F (37.1 °C) (Tympanic)   Resp 18   Ht 154 cm (60.63\")   Wt 104 kg (229 lb 4.5 oz)   SpO2 92%   BMI 43.85 kg/m²       DIAGNOSIS  Final diagnoses:   Pulmonary embolism, bilateral (CMS/HCC)   Elevated troponin   Hypoxia   Acute deep vein thrombosis (DVT) of other specified vein of left lower extremity (CMS/HCC)       FOLLOW UP   No follow-up provider specified.       Shelli Moody PA-C  07/09/20 2032    "

## 2020-07-09 NOTE — CONSULTS
Patient Name: Afia Bernstein  :1955  64 y.o.    Date of Admission: 2020  Date of Consultation:  20  Encounter Provider: ESVIN Us  Place of Service: [facility] CARDIOLOGY  Referring Provider: No ref. provider found  Patient Care Team:  Jorge Abel MD as PCP - General (Family Medicine)  Moisés Christine MD as Consulting Physician (Gastroenterology)      Chief complaint: shortness of breath    History of Present Illness: Mrs. Bernstein is a 64 year old female with history of hypertension, obesity, chronic back pain with epidural injections, and fibromyalgia. Over the last few days patient has noted a pain in her left calf. This morning she had a sudden onset of chest pain and severe shortness of breath. She presented to the emergency room and was found to have large bilateral pulmonary emboli. She is tachycardic. Trop elevated. CT showed evidence of RV dilatation. She was started on a heparin drip.  Lower extremity doppler showed acute left DVT in the popliteal. She is hypoxic and on 2 liters nasal canula.    She has no recent history of travel. She has been more sedentary recently due to back pain as well as not doing normal activities during the pandemic. She has no history of malignancy. She prior history of bleeding or clotting in herself or family members.     Past Medical History:   Diagnosis Date   • Disease of thyroid gland    • Fibromyalgia    • Hypertension        Past Surgical History:   Procedure Laterality Date   • APPENDECTOMY     • CHOLECYSTECTOMY     • COLONOSCOPY     • KNEE SURGERY Right          Prior to Admission medications    Medication Sig Start Date End Date Taking? Authorizing Provider   amLODIPine (NORVASC) 10 MG tablet Take 10 mg by mouth Daily.    Ruddy Hebert MD   losartan-hydrochlorothiazide (HYZAAR) 100-25 MG per tablet  20   Ruddy Hebert MD   traMADol-acetaminophen (ULTRACET) 37.5-325 MG per  tablet  2/21/20   Provider, MD Ruddy       Allergies   Allergen Reactions   • Phenergan [Promethazine Hcl] Anxiety       Social History     Socioeconomic History   • Marital status:      Spouse name: Not on file   • Number of children: Not on file   • Years of education: Not on file   • Highest education level: Not on file   Tobacco Use   • Smoking status: Never Smoker   • Smokeless tobacco: Never Used   Substance and Sexual Activity   • Alcohol use: No   • Drug use: No   • Sexual activity: Defer       Family History   Problem Relation Age of Onset   • Cancer Neg Hx        REVIEW OF SYSTEMS:   All systems reviewed.  Pertinent positives identified in HPI.  All other systems are negative.      Objective:     Vitals:    07/09/20 1318 07/09/20 1335 07/09/20 1348 07/09/20 1620   BP: 153/97 (!) 153/102  (!) 174/102   Pulse:  91 106    Resp:       Temp:       TempSrc:       SpO2:  96% 96% 94%   Weight:       Height:         Body mass index is 43.46 kg/m².    Physical Exam:  Constitutional: She is oriented to person, place, and time. She is in mild acute distress.   HENT:   Head: Normocephalic and atraumatic. Head is without contusion.   Right Ear: Hearing normal. No drainage.   Left Ear: Hearing normal. No drainage.   Nose: No nasal deformity. No epistaxis.   Eyes: Lids are normal. Right eye exhibits no exudate. Left eye exhibits no exudate.  Neck: No JVD present. Carotid bruit is not present. No tracheal deviation present. No thyroid mass and no thyromegaly present.   Cardiovascular: Normal rate, regular rhythm and normal heart sounds.    Pulses:       Posterior tibial pulses are 2+ on the right side, and 2+ on the left side.   Pulmonary/Chest: Effort normal and breath sounds normal.   Abdominal: Soft. Normal appearance and bowel sounds are normal. There is no tenderness.   Musculoskeletal: Normal range of motion.        Right shoulder: She exhibits no deformity.        Left shoulder: She exhibits no  deformity.   Neurological: She is alert and oriented to person, place, and time. She has normal strength.   Skin: Skin is warm, dry and intact. No rash noted.   Psychiatric: she appears anxious. Her behavior is normal. Thought content normal.   Vitals reviewed      Lab Review:     Results from last 7 days   Lab Units 07/09/20  1319   SODIUM mmol/L 140   POTASSIUM mmol/L 4.1   CHLORIDE mmol/L 103   CO2 mmol/L 25.4   BUN mg/dL 33*   CREATININE mg/dL 1.51*   CALCIUM mg/dL 11.0*   BILIRUBIN mg/dL 0.5   ALK PHOS U/L 67   ALT (SGPT) U/L 17   AST (SGOT) U/L 15   GLUCOSE mg/dL 105*     Results from last 7 days   Lab Units 07/09/20  1319   TROPONIN T ng/mL 0.067*     Results from last 7 days   Lab Units 07/09/20  1319   WBC 10*3/mm3 7.81   HEMOGLOBIN g/dL 13.9   HEMATOCRIT % 42.3   PLATELETS 10*3/mm3 203     Results from last 7 days   Lab Units 07/09/20  1319   INR  0.93   APTT seconds 24.8                              Current Facility-Administered Medications:   •  heparin (porcine) 5000 UNIT/ML injection 4,200-8,300 Units, 40-80 Units/kg, Intravenous, Q6H PRN, Shelli Moody PA-C  •  heparin 06731 units/250 mL (100 units/mL) in 0.45 % NaCl infusion, 14.4 Units/kg/hr, Intravenous, Titrated, Shelli Moody PA-C, Last Rate: 14.97 mL/hr at 07/09/20 1617, 14.4 Units/kg/hr at 07/09/20 1617  •  sodium chloride 0.9 % bolus 500 mL, 500 mL, Intravenous, Once, Shelli Moody PA-C, Last Rate: 1,000 mL/hr at 07/09/20 1612, 500 mL at 07/09/20 1612    Current Outpatient Medications:   •  amLODIPine (NORVASC) 10 MG tablet, Take 10 mg by mouth Daily., Disp: , Rfl:   •  losartan-hydrochlorothiazide (HYZAAR) 100-25 MG per tablet, , Disp: , Rfl:   •  traMADol-acetaminophen (ULTRACET) 37.5-325 MG per tablet, , Disp: , Rfl:     Assessment and Plan:       1. Submassive pulmonary embolus with high risk features. She is tachycardic, hypoxic, elevated troponin, and increased RV:LV ratio. She has been placed on IV heparin. Likely due to  sedentary lifestyle and obesity. No history of malignancy, recent surgery, hormone replacement, or travel.     2. Acute left lower extremity DVT    3. Morbid obesity    5. Hypertension - continue home antihypertensives.    Dr. Mendoza and I saw the patient in the emergency room. She was getting a STAT bedside echo which showed no evidence of clot in transit. And moderate RV dilation. We will proceed with bilateral EKOS catheter placement with local TPA. She will be monitored closely in the CCU.     Sonia Wakefield, ESVIN  07/09/20  16:39

## 2020-07-09 NOTE — PROGRESS NOTES
Bilateral lower extremity venous doppler completed. Original order was for left leg only, but changed to bilateral after patient was found to have Pulmonary Emboli and the left leg was positive for DVT. Right leg was found to be negative for DVT. Cardiologist Dr. Mendoza was present and was notified. Jaz GUTIERREZ in the ED was notified.

## 2020-07-09 NOTE — ED TRIAGE NOTES
Pt c/o chest pain and soa that started approx 1.5hr ago while sitting on couch after shower.    Pt placed in mask on arrival. Staff wearing mask and goggles at triage

## 2020-07-10 NOTE — PLAN OF CARE
Pt remains in CCU. EKOS present in the right fem. TPA infusion completed, switched to NS gtt. Lungs sound diminished. Only requiring 1L NC. C/O bladder spasms/fullness and upper/lower back pain. Requested pain medication Q4H. Tolerating advance in diet. Continue to monitor.

## 2020-07-10 NOTE — PROGRESS NOTES
Discharge Planning Assessment  [facility]     Patient Name: Afia Bernstein  MRN: [id number]  Today's Date: 7/10/2020    Admit Date: 7/9/2020    Discharge Needs Assessment     Row Name 07/10/20 1139       Living Environment    Lives With  spouse    Current Living Arrangements  home/apartment/condo    Potentially Unsafe Housing Conditions  unable to assess    Primary Care Provided by  self;spouse/significant other    Provides Primary Care For  no one    Family Caregiver if Needed  spouse    Quality of Family Relationships  supportive    Able to Return to Prior Arrangements  yes       Resource/Environmental Concerns    Resource/Environmental Concerns  none    Transportation Concerns  car, none       Transition Planning    Patient/Family Anticipates Transition to  home with family    Patient/Family Anticipated Services at Transition  none    Transportation Anticipated  family or friend will provide       Discharge Needs Assessment    Concerns to be Addressed  discharge planning    Equipment Currently Used at Home  none    Anticipated Changes Related to Illness  none    Equipment Needed After Discharge  none        Discharge Plan     Row Name 07/10/20 1142       Plan    Plan  Home no needs  30 day free co pay card given for Eloquis    Plan Comments  IMM noted.  CCP spoke to patient at bedside to discuss DC planning.  CCP role explained.  Face sheet verified.  Pt PCP is Dr. Jorge Abel.  Pt emergency contact is her  Emiliano, [phone].  Pt lives in a house with her .  She uses no DME to ambulate.  She is independent with ADL's.  She has no past history of Home Health or rehab stays.  She plans to discharge home.  She obtains her medication at [facility] [place].  CCP checked pt co pay for Eloquis.  Pharmacy stated $47.00.  [email or web address] notified that pt is agreeable with co pay.  CCP following        Destination      Coordination has not been started for this encounter.       Durable Medical Equipment      Coordination has not been started for this encounter.      Dialysis/Infusion      Coordination has not been started for this encounter.      Home Medical Care      Coordination has not been started for this encounter.      Therapy      Coordination has not been started for this encounter.      Community Resources      Coordination has not been started for this encounter.          Demographic Summary    No documentation.       Functional Status    No documentation.       Psychosocial    No documentation.       Abuse/Neglect    No documentation.       Legal    No documentation.       Substance Abuse    No documentation.       Patient Forms    No documentation.           Kenna Oneal RN

## 2020-07-10 NOTE — PLAN OF CARE
Problem: Patient Care Overview  Goal: Plan of Care Review  Outcome: Ongoing (interventions implemented as appropriate)  Flowsheets (Taken 7/10/2020 1822)  Outcome Summary: Ekos removed this am and sheaths pulled. Right groin site soft, CDI. Eliquis started today. OOB without diff. No c/o CP or SOA voiced. 02 weaned off. Tele OF. Awaiting bed.

## 2020-07-10 NOTE — PROGRESS NOTES
[facility] Follow Up    Chief Complaint: Follow up bilateral pulmonary embolism    Interval History:   Breathing some better.  Complains of back pain related to laying in bed all night.  No chest pain.     Objective:     Objective:  Temp:  [98.2 °F (36.8 °C)-98.7 °F (37.1 °C)] 98.6 °F (37 °C)  Heart Rate:  [] 76  Resp:  [18-20] 18  BP: ()/() 116/70     Intake/Output Summary (Last 24 hours) at 7/10/2020 0748  Last data filed at 7/10/2020 0500  Gross per 24 hour   Intake 2493 ml   Output 1425 ml   Net 1068 ml     Body mass index is 46.17 kg/m².      07/09/20  1259 07/09/20  1659 07/10/20  0500   Weight: 104 kg (230 lb) 104 kg (229 lb 4.5 oz) 110 kg (241 lb 6.5 oz)     Weight change:       Physical Exam:   General : Alert, cooperative, in no acute distress.  Neuro: Alert,cooperative and oriented.  Lungs: CTAB. Normal respiratory effort and rate.  CV: Regular rate and rhythm, normal S1 and S2, no murmurs, gallops or rubs.  ABD: Soft, nontender, nondistended. Positive bowel sounds.  Extr: No edema or cyanosis, moves all extremities.    Lab Review:   Results from last 7 days   Lab Units 07/10/20  0427 07/09/20  1319   SODIUM mmol/L 139 140   POTASSIUM mmol/L 4.2 4.1   CHLORIDE mmol/L 107 103   CO2 mmol/L 24.2 25.4   BUN mg/dL 25* 33*   CREATININE mg/dL 1.13* 1.51*   GLUCOSE mg/dL 101* 105*   CALCIUM mg/dL 9.1 11.0*   AST (SGOT) U/L  --  15   ALT (SGPT) U/L  --  17     Results from last 7 days   Lab Units 07/09/20  1319   TROPONIN T ng/mL 0.067*     Results from last 7 days   Lab Units 07/10/20  0427 07/10/20  0030   WBC 10*3/mm3 5.59 6.19   HEMOGLOBIN g/dL 12.0 11.8*   HEMATOCRIT % 35.4 35.6   PLATELETS 10*3/mm3 151 163     Results from last 7 days   Lab Units 07/10/20  0427 07/10/20  0030   INR  1.05 1.04   APTT seconds 28.7 35.3               Invalid input(s): LDLCALC  Results from last 7 days   Lab Units 07/09/20  1319   PROBNP pg/mL 296.2         I reviewed the patient's new  clinical results.  I personally viewed and interpreted the patient's EKG  Current Medications:   Scheduled Meds:  amLODIPine 10 mg Oral Daily   losartan-HCTZ (HYZAAR) 100-25 combo dose  Oral Daily     Continuous Infusions:  alteplase (ACTIVASE) 10 mg in 0.9% NaCl 250 mL- Cathflow for Angiography 1 mg/hr Last Rate: 1 mg/hr (07/09/20 2034)   alteplase (ACTIVASE) 10 mg in 0.9% NaCl 250 mL- Cathflow for Angiography 1 mg/hr Last Rate: 1 mg/hr (07/09/20 2034)   alteplase (ACTIVASE) 10 mg in 0.9% NaCl 250 mL- Cathflow for Angiography 1 mg/hr Last Rate: Stopped (07/10/20 0437)   alteplase (ACTIVASE) 10 mg in 0.9% NaCl 250 mL- Cathflow for Angiography 1 mg/hr Last Rate: Stopped (07/10/20 0436)   heparin (porcine) 400 Units/hr Last Rate: 400 Units/hr (07/09/20 2032)   sodium chloride 30 mL/hr Last Rate: Stopped (07/09/20 2037)   sodium chloride 30 mL/hr Last Rate: Stopped (07/09/20 2036)   sodium chloride 35 mL/hr Last Rate: 35 mL/hr (07/09/20 2038)   sodium chloride 35 mL/hr Last Rate: 35 mL/hr (07/09/20 2035)       Allergies:  Allergies   Allergen Reactions   • Phenergan [Promethazine Hcl] Anxiety       Assessment/Plan:     1. Bilateral pulmonary embolism with acute cor pulmonale.  Now status post catheter directed thrombolysis with EKOS.  Catheters removed this morning.  Sheaths also to be removed.  Patient admits to being more sedentary over the months leading up to this event due to back issues.   2. Left lower extremity DVT  3. Acute hypoxic respiratory failure.  Improved.  On 1 liter nasal cannula this morning.   4. Hypertension.  Controlled on home meds.   5. Chronic back pain  6. Fibromyalgia  7. Morbid obesity  8. CKD.  Stable.     - Will resume heparin weight based protocol once venous sheaths removed  -  Will mobilize once bedrest complete following sheath removal  -  Start apixaban later today or tomorrow if she continues to do well  -  Will transfer to telemetry later today or tomorrow  -  Her thromboembolism  was likely related to combination of decreased activity and morbid obesity.  With her morbid obesity she will continue to be at high risk and I would recommend that she remain on anticoagulation indefinitely.     Alejandra Bright MD  07/10/20  07:48

## 2020-07-11 NOTE — PROGRESS NOTES
"Patient Care Team:  Jorge Abel MD as PCP - General (Family Medicine)  Moisés Christine MD as Consulting Physician (Gastroenterology)    Chief Complaint: Follow-up intermediate-high risk pulmonary embolus with acute cor pulmonale.    Interval History:   Resting comfortably.  Not short of air.  On room air.    Objective   Vital Signs  Temp:  [98.3 °F (36.8 °C)-98.5 °F (36.9 °C)] 98.4 °F (36.9 °C)  Heart Rate:  [71-99] 86  Resp:  [24] 24  BP: (122-139)/(67-98) 130/75    Intake/Output Summary (Last 24 hours) at 7/11/2020 0901  Last data filed at 7/11/2020 0407  Gross per 24 hour   Intake 600 ml   Output --   Net 600 ml     Flowsheet Rows      First Filed Value   Admission Height  154.9 cm (61\") Documented at 07/09/2020 1254   Admission Weight  104 kg (230 lb) Documented at 07/09/2020 1259          General Appearance:    Alert, cooperative, in no acute distress   Head:    Normocephalic, without obvious abnormality, atraumatic       Neck:   No adenopathy, supple, no thyromegaly, no carotid bruit, no    JVD   Lungs:     Clear to auscultation bilaterally, no wheezes, rales, or     rhonchi    Heart:    Normal rate, regular rhythm, no murmur, no rub, no gallop   Chest Wall:    No abnormalities observed   Abdomen:     Normal bowel sounds, soft, nontender, nondistended,            no rebound tenderness   Extremities:   No cyanosis, clubbing, or edema   Pulses:   Pulses palpable and equal bilaterally   Skin:   No bleeding or rash       Neurologic:   Cranial nerves 2 - 12 grossly intact, sensation intact             amLODIPine 10 mg Oral Daily   apixaban 10 mg Oral Q12H   Followed by      [START ON 7/17/2020] apixaban 5 mg Oral Q12H   losartan-HCTZ (HYZAAR) 100-25 combo dose  Oral Daily            Results Review:    Results from last 7 days   Lab Units 07/11/20  0420   SODIUM mmol/L 141   POTASSIUM mmol/L 4.0   CHLORIDE mmol/L 106   CO2 mmol/L 24.4   BUN mg/dL 23   CREATININE mg/dL 1.20*   GLUCOSE mg/dL 100* "   CALCIUM mg/dL 9.2     Results from last 7 days   Lab Units 07/09/20  1319   TROPONIN T ng/mL 0.067*     Results from last 7 days   Lab Units 07/11/20  0420   WBC 10*3/mm3 6.18   HEMOGLOBIN g/dL 12.3   HEMATOCRIT % 36.3   PLATELETS 10*3/mm3 145     Results from last 7 days   Lab Units 07/10/20  0427 07/10/20  0030 07/09/20  1319   INR  1.05 1.04 0.93   APTT seconds 28.7 35.3 24.8                 @LABNT(bnp)@  I reviewed the patient's new clinical results.  I personally viewed and interpreted the patient's EKG/Telemetry data            Assessment/Plan   1.  Intermediate-high risk pulmonary embolus with acute cor pulmonale: Status post EKOS catheter placement and localized TPA infusion.  2.  Acute left lower extremity DVT  3.  Morbid obesity  4.  Essential hypertension  5.  Chronic back pain  6.  CKD      -Eliquis has been started loading dose.  I agree.  I would recommend lifelong anticoagulant therapy unless the patient had substantial lifestyle changes including increased mobility and a significant amount of weight loss.  Her risk would continue otherwise.  -I think she looks good.  I am planning on getting her up and walking her around today.  She can transfer to the floor if there is a bed available.  -My hope is to get her home tomorrow.

## 2020-07-11 NOTE — PLAN OF CARE
Problem: Patient Care Overview  Goal: Plan of Care Review  Outcome: Ongoing (interventions implemented as appropriate)  Flowsheets (Taken 7/11/2020 0286)  Plan of Care Reviewed With: patient  Outcome Summary: Remains on roomair. OOB to bathroom independently.Vitals stable. No complaint offered. Will transfer out of critical care when bed is available.

## 2020-07-12 NOTE — DISCHARGE SUMMARY
Date of Discharge:  7/12/2020  Date of Admit: 7/9/2020    Discharge Diagnosis:  1.  Intermediate-high risk pulmonary embolus with acute cor pulmonale: Status post EKOS catheter placement and localized TPA infusion.  2.  Acute left lower extremity DVT  3.  Morbid obesity  4.  Essential hypertension  5.  Chronic back pain  6.  CKD    Hospital Course: 64-year-old female with a medical history of hypertension, obesity, chronic back pain with epidural injections, fibromyalgia and immobility who had the sudden onset of severe chest pain and shortness of breath.  She presented to the emergency room.  This was pleuritic.  She had bilateral pulmonary emboli with acute cor pulmonale.  She was taken for EKOS catheter placement and tolerated this well.  She was transitioned from heparin to Eliquis therapy.  She is appropriate for discharge home today    Procedures Performed  Procedure(s):  Ekos catheter placement  Pulmonary angiography  Thrombolytic Therapy-via EKOS  Right Heart Cath     7/9/2020  RIGHT HEART HEMODYNAMICS:    1.  Right atrial pressure: 10/3, 4  2.  Right ventricular pressure: 48/5/9  3.  Pulmonary artery pressure: 45/12, 29  4.  Pulmonary artery saturation: 56% on 4 L nasal cannula     POST-PROCEDURE DIAGNOSIS:   1.  Bilateral pulmonary embolism with cor pulmonale status post bilateral EKOS catheter placement for catheter directed thrombolysis therapy  2.  Moderate pulmonary hypertension     RECOMMENDATIONS:   Continue catheter directed thrombolytic therapy overnight.  She will receive a total infusion of 10 mg of TPA in each pulmonary artery.  Once this complete will remove catheters and sheaths tomorrow morning and resume heparin infusion subsequently.    Consults     No orders found from 6/10/2020 to 7/10/2020.          Pertinent Test Results:  · Calculated right ventricular systolic pressure from tricuspid regurgitation is 57 mmHg.  · Moderate tricuspid valve regurgitation is present.  · Left ventricular  "systolic function is hyperdynamic (EF > 70).  · Right ventricular cavity is mildly dilated.  · Moderately reduced right ventricular systolic function noted.  · The quality of the study is limited due to poor acoustic windows related to limited views obtained, patient body habitus, patient positioning and lung disease.      Discharge Physical Exam:  Temp:  [97.9 °F (36.6 °C)-99.3 °F (37.4 °C)] 98.2 °F (36.8 °C)  Heart Rate:  [] 75  Resp:  [18-20] 18  BP: (123-151)/(75-94) 145/85    Intake/Output Summary (Last 24 hours) at 7/12/2020 1052  Last data filed at 7/12/2020 1051  Gross per 24 hour   Intake 1080 ml   Output --   Net 1080 ml     Flowsheet Rows      First Filed Value   Admission Height  154.9 cm (61\") Documented at 07/09/2020 1254   Admission Weight  104 kg (230 lb) Documented at 07/09/2020 1259          General Appearance:    Alert, cooperative, in no acute distress   Head:    Normocephalic, without obvious abnormality, atraumatic       Neck:   No adenopathy, supple, no thyromegaly, no carotid bruit, no    JVD   Lungs:     Clear to auscultation bilaterally, no wheezes, rales, or     rhonchi    Heart:    Normal rate, regular rhythm, no murmur, no rub, no gallop   Chest Wall:    No abnormalities observed   Abdomen:     Normal bowel sounds, soft, nontender, nondistended,            no rebound tenderness   Extremities:   No cyanosis, clubbing, or edema   Pulses:   Pulses palpable and equal bilaterally   Skin:   No bleeding or rash       Neurologic:   Cranial nerves 2 - 12 grossly intact, sensation intact             Discharge Medications     Your medication list      START taking these medications      Instructions Last Dose Given Next Dose Due   Eliquis DVT/PE Starter Pack tablet      Take two 5 mg tablets by mouth every 12 hours for 7 days. Followed by one 5 mg tablet every 12 hours. (Dispense starter pack if available)       apixaban 5 MG tablet tablet  Commonly known as:  JEROQUVARUN      Take 2 tablets by " mouth Every 12 (Twelve) Hours for 9 doses. Indications: DVT/PE (active thrombosis)       apixaban 5 MG tablet tablet  Commonly known as:  ELIQUIS  Start taking on:  July 17, 2020      Take 1 tablet by mouth Every 12 (Twelve) Hours. Indications: DVT/PE (active thrombosis)          CHANGE how you take these medications      Instructions Last Dose Given Next Dose Due   amLODIPine 10 MG tablet  Commonly known as:  NORVASC  Start taking on:  July 13, 2020  What changed:    · medication strength  · how much to take      Take 1 tablet by mouth Daily.          CONTINUE taking these medications      Instructions Last Dose Given Next Dose Due   losartan-hydrochlorothiazide 100-25 MG per tablet  Commonly known as:  HYZAAR      Take 1 tablet by mouth Daily.       pregabalin 75 MG capsule  Commonly known as:  LYRICA      Take 75 mg by mouth 2 (Two) Times a Day.       traMADol-acetaminophen 37.5-325 MG per tablet  Commonly known as:  ULTRACET      Take 1 tablet by mouth Every 4 (Four) Hours As Needed for Moderate Pain .             Where to Get Your Medications      These medications were sent to [facility] [place] [id number]    Hours:  7:00AM-6PM Mon-Fri Phone:  [phone]   · amLODIPine 10 MG tablet  · apixaban 5 MG tablet tablet  · apixaban 5 MG tablet tablet  · Eliquis DVT/PE Starter Pack tablet         Discharge Diet: cardiac     Activity at Discharge: ad mynor    Discharge disposition: home    Condition on Discharge: good  Follow-up Appointments  [place] in 1 mo  NP in 1 week    Test Results Pending at Discharge       Waqas Alcantara MD  07/12/20  10:51    Greater than 30 min spent in reviewing records, discussion and examination of the patient and discussion with other members of the patient's medical team.     Dictated utilizing Dragon dictation

## 2020-07-13 NOTE — PROGRESS NOTES
Case Management Discharge Note      Final Note: Home with no needs. Transported via private auto         Destination      No service has been selected for the patient.      Durable Medical Equipment      No service has been selected for the patient.      Dialysis/Infusion      No service has been selected for the patient.      Home Medical Care      No service has been selected for the patient.      Therapy      No service has been selected for the patient.      Community Resources      No service has been selected for the patient.        Transportation Services  Private: Car    Final Discharge Disposition Code: 01 - home or self-care

## 2020-07-22 PROBLEM — N18.30 CKD (CHRONIC KIDNEY DISEASE) STAGE 3, GFR 30-59 ML/MIN (HCC): Status: ACTIVE | Noted: 2020-01-01

## 2020-07-22 PROBLEM — I82.4Y3 DEEP VEIN THROMBOSIS (DVT) OF PROXIMAL VEIN OF BOTH LOWER EXTREMITIES (HCC): Status: ACTIVE | Noted: 2020-01-01

## 2020-07-23 PROBLEM — I10 ESSENTIAL HYPERTENSION: Status: ACTIVE | Noted: 2020-01-01

## 2020-07-25 NOTE — OUTREACH NOTE
Prep Survey      Responses   [facility] facility patient discharged from?  [place]   Is LACE score < 7 ?  No   Eligibility  Readm Mgmt   Discharge diagnosis  DVT   COVID-19 Test Status  Not tested   Does the patient have one of the following disease processes/diagnoses(primary or secondary)?  Other   Does the patient have Home health ordered?  No   Is there a DME ordered?  No   Comments regarding appointments  Per AVS   Medication alerts for this patient  Switched from eliquis to lovenox and coumadin   Prep survey completed?  Yes          Cinthya Nation RN

## 2020-07-27 NOTE — OUTREACH NOTE
Medical Week 1 Survey      Responses   [facility] patient discharged from?  [place]   COVID-19 Test Status  Not tested   Does the patient have one of the following disease processes/diagnoses(primary or secondary)?  Other   Is there a successful TCM telephone encounter documented?  No   Week 1 attempt successful?  No   Unsuccessful attempts  Attempt 1          Lucille Gale, RN

## 2020-07-31 NOTE — OUTREACH NOTE
Medical Week 1 Survey      Responses   [facility]   COVID-19 Test Status  Not tested   Does the patient have one of the following disease processes/diagnoses(primary or secondary)?  Other   Is there a successful TCM telephone encounter documented?  No   Week 1 attempt successful?  Yes   Call start time  1341   Call end time  1345   Discharge diagnosis  DVT   Meds reviewed with patient/caregiver?  Yes   Is the patient having any side effects they believe may be caused by any medication additions or changes?  No   Does the patient have all medications ordered at discharge?  Yes   Is the patient taking all medications as directed (includes completed medication regime)?  Yes   Does the patient have a primary care provider?   Yes   Does the patient have an appointment with their PCP within 7 days of discharge?  Yes   Has the patient kept scheduled appointments due by today?  Yes   Has home health visited the patient within 72 hours of discharge?  N/A   Pulse Ox monitoring  None   Psychosocial issues?  No   Did the patient receive a copy of their discharge instructions?  Yes   Nursing interventions  Reviewed instructions with patient   What is the patient's perception of their health status since discharge?  Improving   Is the patient/caregiver able to teach back signs and symptoms related to disease process for when to call PCP?  Yes   Is the patient/caregiver able to teach back signs and symptoms related to disease process for when to call 911?  Yes   Is the patient/caregiver able to teach back the hierarchy of who to call/visit for symptoms/problems? PCP, Specialist, Home health nurse, Urgent Care, ED, 911  Yes   Additional teach back comments  Blood was high on Monday and shots were held, waiting on yesterdays results.  Encouraged smaller meals more often for more energy.   Week 1 call completed?  Yes   Wrap up additional comments  She is very tired, eating and sleeping ok.           Sowmya Ortiz, RN

## 2020-08-13 PROBLEM — I61.9 INTRAPARENCHYMAL HEMORRHAGE OF BRAIN (HCC): Status: ACTIVE | Noted: 2020-01-01

## 2020-08-13 PROBLEM — R79.1 SUPRATHERAPEUTIC INR: Status: ACTIVE | Noted: 2020-01-01

## 2020-08-13 NOTE — H&P
Group: [facility]         CRITICAL CARE ADMISSION    Patient Identification:  Afia Bernstein  64 y.o.  female  1955  [id number]                CC: Headache and left-sided weakness    History of Present Illness:  64-year-old  female who presents for sudden headache and decreased mental status.  She is currently intubated, mechanically ventilated.  I discussed the case with Dr. Heard from emergency room.  Apparently she started complaining of right-sided headache last night.  She rolled out of bed earlier this morning around 3 AM but she denied any head trauma.  She was brought to the emergency room this morning around 11 AM.  She has been having some left-sided weakness with right-sided headache.  The symptoms were present upon arrival.  Shortly thereafter her mental status decreased and Dr. Heard had to intubate her to protect her airway.  I reviewed old medical records.  Recently she was in the hospital in July and had a DVT and bilateral pulmonary embolism and was placed on Eliquis.  Apparently this was subsequently changed for presumed failure.  She was readmitted to the hospital and finally discharged on Coumadin with Lovenox bridging.  I reviewed discharge summary dictated by Dr. Laughlni on July 24, 2020.     During my assessment at the bedside in the intensive care unit the patient is eliciting some repetitive jerking movement of her head and some shoulder shrugging.    Review of Systems   Unable to perform ROS: Intubated       Past Medical History:  Past Medical History:   Diagnosis Date   • CKD (chronic kidney disease) stage 3, GFR 30-59 ml/min (CMS/HCC)    • Deep vein thrombosis (DVT) of proximal vein of both lower extremities (CMS/HCC)    • Disease of thyroid gland    • Fibromyalgia    • Hypertension    • Morbid obesity (CMS/HCC)    • Pulmonary embolism, bilateral (CMS/HCC)        Past Surgical History:  Past Surgical History:   Procedure Laterality Date   • APPENDECTOMY     •  CARDIAC CATHETERIZATION Bilateral 7/9/2020    Procedure: Pulmonary angiography;  Surgeon: Alejandra Bright MD;  Location:  [facility];  Service: Cardiovascular;  Laterality: Bilateral;   • CARDIAC CATHETERIZATION N/A 7/9/2020    Procedure: Thrombolytic Therapy-via EKOS;  Surgeon: Alejandra Bright MD;  Location:  [facility];  Service: Cardiovascular;  Laterality: N/A;   • CARDIAC CATHETERIZATION N/A 7/9/2020    Procedure: Right Heart Cath;  Surgeon: Alejandra Bright MD;  Location:  [facility];  Service: Cardiovascular;  Laterality: N/A;   • CHOLECYSTECTOMY     • COLONOSCOPY     • EKOS CATHETER PLACEMENT Bilateral 7/9/2020    Procedure: Ekos catheter placement;  Surgeon: Alejandra Bright MD;  Location:  [facility];  Service: Cardiovascular;  Laterality: Bilateral;   • KNEE SURGERY Right         Home Meds:  Medications Prior to Admission   Medication Sig Dispense Refill Last Dose   • amLODIPine (NORVASC) 10 MG tablet Take 1 tablet by mouth Daily. 30 tablet 6    • enoxaparin (LOVENOX) 120 MG/0.8ML solution syringe Inject 0.73 mL under the skin into the appropriate area as directed Every 12 (Twelve) Hours. Indications: DVT/PE (active thrombosis) 16 mL 0    • losartan-hydrochlorothiazide (HYZAAR) 100-25 MG per tablet Take 1 tablet by mouth Daily.   Taking   • traMADol-acetaminophen (ULTRACET) 37.5-325 MG per tablet Take 1 tablet by mouth Every 4 (Four) Hours As Needed for Moderate Pain .   Taking   • warfarin (COUMADIN) 5 MG tablet Take 1 tablet by mouth Every Night. Indications: DVT/PE (active thrombosis) 30 tablet 0        Allergies:  Allergies   Allergen Reactions   • Phenergan [Promethazine Hcl] Anxiety       Social History:   Social History     Socioeconomic History   • Marital status:      Spouse name: Not on file   • Number of children: Not on file   • Years of education: Not on file   • Highest education level: Not on file   Tobacco Use    • Smoking status: Never Smoker   • Smokeless tobacco: Never Used   Substance and Sexual Activity   • Alcohol use: No   • Drug use: No   • Sexual activity: Defer       Family History:  Family History   Problem Relation Age of Onset   • Cancer Neg Hx        Physical Exam:  /95   Pulse 100   Temp 97.8 °F (36.6 °C) (Oral)   Resp 22   Wt 107 kg (235 lb 14.3 oz)   SpO2 97%   BMI 44.57 kg/m²  Body mass index is 44.57 kg/m². 97% 107 kg (235 lb 14.3 oz)  Physical Exam   Constitutional:   Patient intubated   HENT:   Right Ear: External ear normal.   Left Ear: External ear normal.   Nose: Nose normal.   Oral exam shows endotracheal tube in good position   Eyes: Conjunctivae are normal. Right eye exhibits no discharge. Left eye exhibits no discharge.   Pupils seem equal but sluggishly reactive   Neck: No JVD present. No tracheal deviation present. No thyromegaly present.   Cardiovascular: Normal rate, regular rhythm and normal heart sounds.   No murmur heard.  Pulmonary/Chest: Effort normal and breath sounds normal.   No use of accessory muscles, no dullness to percussion.  No wheezing   Abdominal: Soft. She exhibits no mass. There is no tenderness. There is no rebound.   No liver or spleen enlargement, palpated   Musculoskeletal: She exhibits no deformity.   Neurological:   Patient is exhibiting jerking movement, intermittent side to side movement of her head and shrugging of her shoulders, possible seizure activity.  She does not open eyes, she does not follow commands   Skin: Skin is warm. No rash noted.   No palpable nodules   Psychiatric:   Patient is intubated, unresponsive       LABS:  COVID19   Date Value Ref Range Status   08/13/2020 Not Detected Not Detected - Ref. Range Final       Lab Results   Component Value Date    CALCIUM 9.4 08/13/2020    PHOS 3.5 07/24/2020     Results from last 7 days   Lab Units 08/13/20  1141   SODIUM mmol/L 136   POTASSIUM mmol/L 3.8   CHLORIDE mmol/L 100   CO2 mmol/L 24.8    BUN mg/dL 19   CREATININE mg/dL 1.25*   GLUCOSE mg/dL 169*   CALCIUM mg/dL 9.4   WBC 10*3/mm3 10.95*   HEMOGLOBIN g/dL 13.4   PLATELETS 10*3/mm3 260   ALT (SGPT) U/L 18   AST (SGOT) U/L 17     Lab Results   Component Value Date    TROPONINT <0.010 08/13/2020     Results from last 7 days   Lab Units 08/13/20  1141   TROPONIN T ng/mL <0.010             Results from last 7 days   Lab Units 08/13/20  1417   PH, ARTERIAL pH units 7.455*   PCO2, ARTERIAL mm Hg 36.7   PO2 ART mm Hg 108.7*   MODALITY  Adult Vent   O2 SATURATION CALC % 98.5       Results from last 7 days   Lab Units 08/13/20  1141   INR  8.27*         No results found for: TSH  Estimated Creatinine Clearance: 50.6 mL/min (A) (by C-G formula based on SCr of 1.25 mg/dL (H)).         Imaging: I personally visualized the images of CT scan of the head showing very large intracerebral hemorrhage right frontal lobe with midline shift and brain edema      Assessment:  Acute intracranial hemorrhage right frontal lobe  Brain swelling  Brain compression  Seizure-like activity  Coagulopathy, on warfarin  Recent DVT with pulmonary embolism  Hypertension      Recommendations:  Patient is seriously ill.  At high risk of dying.  She has a very large intracerebral hemorrhage.  She has received IV K-Centra for reversal of Coumadin effect.  She has been intubated due to inability to protect her airway and reduced level of consciousness.  She is now exhibiting seizure-like activity.  We will start Ativan IV.  She has been given Keppra in the emergency room.  We will continue Keppra IV.  Consult neurology and consider starting EEG monitoring.  Neurosurgery has been consulted.  Repeat head CT at this time.  She will need mannitol for brain edema and swelling.  Maintain systolic blood pressure below 160.  Start IV Cardene and IV labetalol.  Start Pepcid IV for stress ulcer prophylaxis  Very poor prognosis.  I clearly conveyed this to her  at the bedside.  I personally  believe this is nonsurvivable.  She may have extreme deficits with complete left hemiparesis and severe cognitive deficit and chronic dysphagia if she survives this.  Place feeding tube tomorrow.  Start maintenance IV fluids.  Pneumatic compression devices to the legs to prevent DVT.  Discussed with nurse and     Total critical care time 40 minutes excluding any separately billable procedure time    Elier Mckoy MD  8/13/2020  15:29      Much of this encounter note is an electronic transcription/translation of spoken language to printed text using Dragon Software.

## 2020-08-13 NOTE — ED TRIAGE NOTES
EMS states that pt complained of a headache. They were dispatched @ 1045. When they arrived pt was neurologically intact. Pt was using the restroom while EMS was there and developed a left sided facial droop and left sided weakness. Pt did fall out of bed twice last night. Pt masked at arrival and triage staff wore all appropriate PPE.

## 2020-08-13 NOTE — ED PROVIDER NOTES
EMERGENCY DEPARTMENT ENCOUNTER    Room Number:  I372/1  Date of encounter:  8/13/2020  PCP: Jorge Abel MD  Historian: Patient      HPI:  Chief Complaint: Headache and left-sided weakness    A complete HPI/ROS/PMH/PSH/SH/FH are unobtainable due to: Decreased mental status    Context: Afia Bernstein is a 64 y.o. female who presents to the ED c/o right-sided headache that began last night.  She states she rolled out of bed about 3 AM but denies striking her head.  He states she is then got up early this morning was able to move around per EMS report about 1045 the patient went to the bathroom and developed some left-sided weakness which is why she has been brought to the emergency room this morning.  The patient still complains of a right-sided headache and is vomiting upon arrival.  Of note is that the patient was admitted to the hospital in early July with DVT and placed on Eliquis which failed and was readmitted to the hospital in late July and changed to Coumadin with a Lovenox bridge.  The patient states she is no longer on Lovenox and that she believes her last INR was in the threes but not exactly sure.      PAST MEDICAL HISTORY  Active Ambulatory Problems     Diagnosis Date Noted   • Pulmonary embolism, bilateral (CMS/[facility]) 07/09/2020   • Deep vein thrombosis (DVT) of proximal vein of both lower extremities (CMS/[facility]) 07/22/2020   • CKD (chronic kidney disease) stage 3, GFR 30-59 ml/min (CMS/[facility]) 07/22/2020   • Essential hypertension 07/23/2020     Resolved Ambulatory Problems     Diagnosis Date Noted   • No Resolved Ambulatory Problems     Past Medical History:   Diagnosis Date   • Disease of thyroid gland    • Fibromyalgia    • Hypertension    • Morbid obesity (CMS/[facility])          PAST SURGICAL HISTORY  Past Surgical History:   Procedure Laterality Date   • APPENDECTOMY     • CARDIAC CATHETERIZATION Bilateral 7/9/2020    Procedure: Pulmonary angiography;  Surgeon: Alejandra Bright MD;  [facility]: [facility]  CATH INVASIVE LOCATION;  Service: Cardiovascular;  Laterality: Bilateral;   • CARDIAC CATHETERIZATION N/A 7/9/2020    Procedure: Thrombolytic Therapy-via EKOS;  Surgeon: Alejandra Bright MD;  Location:  [facility] CATH INVASIVE LOCATION;  Service: Cardiovascular;  Laterality: N/A;   • CARDIAC CATHETERIZATION N/A 7/9/2020    Procedure: Right Heart Cath;  Surgeon: Alejandra Bright MD;  Location:  [facility] CATH INVASIVE LOCATION;  Service: Cardiovascular;  Laterality: N/A;   • CHOLECYSTECTOMY     • COLONOSCOPY     • EKOS CATHETER PLACEMENT Bilateral 7/9/2020    Procedure: Ekos catheter placement;  Surgeon: Alejandra Bright MD;  Location:  [facility] CATH INVASIVE LOCATION;  Service: Cardiovascular;  Laterality: Bilateral;   • KNEE SURGERY Right          FAMILY HISTORY  Family History   Problem Relation Age of Onset   • Cancer Neg Hx          SOCIAL HISTORY  Social History     Socioeconomic History   • Marital status:      Spouse name: Not on file   • Number of children: Not on file   • Years of education: Not on file   • Highest education level: Not on file   Tobacco Use   • Smoking status: Never Smoker   • Smokeless tobacco: Never Used   Substance and Sexual Activity   • Alcohol use: No   • Drug use: No   • Sexual activity: Defer         ALLERGIES  Phenergan [promethazine hcl]        REVIEW OF SYSTEMS  Review of Systems     Limited due to patient's decreased mental status    All systems reviewed and negative except for those discussed in HPI.     PHYSICAL EXAM    I have reviewed the triage vital signs and nursing notes.    ED Triage Vitals [08/13/20 1128]   Temp Heart Rate Resp BP SpO2   -- 64 18 145/79 94 %      Temp src Heart Rate Source Patient Position BP Location FiO2 (%)   -- Monitor Lying -- --       GENERAL: 64-year-old well developed, well nourished in moderate distress actively vomiting  HENT: NCAT, neck supple, trachea midline  EYES: no scleral icterus, PERRL, normal conjunctiva  CV: regular rhythm, regular rate,  no murmur  RESPIRATORY: unlabored effort, CTAB  ABDOMEN: soft, non-tender, non-distended, bowel sounds present  MUSCULOSKELETAL: no gross deformity, no pedal edema, no calf tenderness: Bruise to left upper arm without any bony tenderness  SKIN: warm, dry, no rash  PSYCH:  Appropriate mood and affect  Neuro: Patient is somewhat lethargic but easily arousable and oriented x3.  However she does have left-sided weakness.  See NIHSS    NIHSS 6    PPE  Pt does not present with symptoms for COVID19; however, I was wearing a mask throughout all patient interaction.      Vital signs and nursing notes reviewed.      LAB RESULTS  Recent Results (from the past 24 hour(s))   POC Glucose Once    Collection Time: 08/13/20 11:35 AM   Result Value Ref Range    Glucose 161 (H) 70 - 130 mg/dL   Comprehensive Metabolic Panel    Collection Time: 08/13/20 11:41 AM   Result Value Ref Range    Glucose 169 (H) 65 - 99 mg/dL    BUN 19 8 - 23 mg/dL    Creatinine 1.25 (H) 0.57 - 1.00 mg/dL    Sodium 136 136 - 145 mmol/L    Potassium 3.8 3.5 - 5.2 mmol/L    Chloride 100 98 - 107 mmol/L    CO2 24.8 22.0 - 29.0 mmol/L    Calcium 9.4 8.6 - 10.5 mg/dL    Total Protein 7.3 6.0 - 8.5 g/dL    Albumin 3.80 3.50 - 5.20 g/dL    ALT (SGPT) 18 1 - 33 U/L    AST (SGOT) 17 1 - 32 U/L    Alkaline Phosphatase 66 39 - 117 U/L    Total Bilirubin 0.4 0.0 - 1.2 mg/dL    eGFR Non African Amer 43 (L) >60 mL/min/1.73    Globulin 3.5 gm/dL    A/G Ratio 1.1 g/dL    BUN/Creatinine Ratio 15.2 7.0 - 25.0    Anion Gap 11.2 5.0 - 15.0 mmol/L   Protime-INR    Collection Time: 08/13/20 11:41 AM   Result Value Ref Range    Protime 64.9 (C) 11.7 - 14.2 Seconds    INR 8.27 (C) 0.90 - 1.10   aPTT    Collection Time: 08/13/20 11:41 AM   Result Value Ref Range    PTT 74.1 (H) 22.7 - 35.4 seconds   Troponin    Collection Time: 08/13/20 11:41 AM   Result Value Ref Range    Troponin T <0.010 0.000 - 0.030 ng/mL   Type & Screen    Collection Time: 08/13/20 11:41 AM   Result Value Ref  Range    ABO Type A     RH type Positive     Antibody Screen Negative     T&S Expiration Date 8/16/2020 11:59:59 PM    Light Blue Top    Collection Time: 08/13/20 11:41 AM   Result Value Ref Range    Extra Tube hold for add-on    Green Top (Gel)    Collection Time: 08/13/20 11:41 AM   Result Value Ref Range    Extra Tube Hold for add-ons.    Lavender Top    Collection Time: 08/13/20 11:41 AM   Result Value Ref Range    Extra Tube hold for add-on    Gold Top - SST    Collection Time: 08/13/20 11:41 AM   Result Value Ref Range    Extra Tube Hold for add-ons.    CBC Auto Differential    Collection Time: 08/13/20 11:41 AM   Result Value Ref Range    WBC 10.95 (H) 3.40 - 10.80 10*3/mm3    RBC 4.24 3.77 - 5.28 10*6/mm3    Hemoglobin 13.4 12.0 - 15.9 g/dL    Hematocrit 40.6 34.0 - 46.6 %    MCV 95.8 79.0 - 97.0 fL    MCH 31.6 26.6 - 33.0 pg    MCHC 33.0 31.5 - 35.7 g/dL    RDW 13.1 12.3 - 15.4 %    RDW-SD 46.6 37.0 - 54.0 fl    MPV 9.8 6.0 - 12.0 fL    Platelets 260 140 - 450 10*3/mm3    Neutrophil % 89.8 (H) 42.7 - 76.0 %    Lymphocyte % 4.7 (L) 19.6 - 45.3 %    Monocyte % 4.5 (L) 5.0 - 12.0 %    Eosinophil % 0.0 (L) 0.3 - 6.2 %    Basophil % 0.4 0.0 - 1.5 %    Immature Grans % 0.6 (H) 0.0 - 0.5 %    Neutrophils, Absolute 9.84 (H) 1.70 - 7.00 10*3/mm3    Lymphocytes, Absolute 0.51 (L) 0.70 - 3.10 10*3/mm3    Monocytes, Absolute 0.49 0.10 - 0.90 10*3/mm3    Eosinophils, Absolute 0.00 0.00 - 0.40 10*3/mm3    Basophils, Absolute 0.04 0.00 - 0.20 10*3/mm3    Immature Grans, Absolute 0.07 (H) 0.00 - 0.05 10*3/mm3    nRBC 0.0 0.0 - 0.2 /100 WBC   Respiratory Panel PCR w/COVID-19(SARS-CoV-2) [facility] In-House, NP Swab in UTM/VTM, 3-4 HR TAT - Swab, Nasopharynx    Collection Time: 08/13/20 12:13 PM   Result Value Ref Range    ADENOVIRUS, PCR Not Detected Not Detected    Coronavirus 229E Not Detected Not Detected    Coronavirus HKU1 Not Detected Not Detected    Coronavirus NL63 Not Detected Not Detected    Coronavirus OC43  Not Detected Not Detected    COVID19 Not Detected Not Detected - Ref. Range    Human Metapneumovirus Not Detected Not Detected    Human Rhinovirus/Enterovirus Not Detected Not Detected    Influenza A PCR Not Detected Not Detected    Influenza A H1 Not Detected Not Detected    Influenza A H1 2009 PCR Not Detected Not Detected    Influenza A H3 Not Detected Not Detected    Influenza B PCR Not Detected Not Detected    Parainfluenza Virus 1 Not Detected Not Detected    Parainfluenza Virus 2 Not Detected Not Detected    Parainfluenza Virus 3 Not Detected Not Detected    Parainfluenza Virus 4 Not Detected Not Detected    RSV, PCR Not Detected Not Detected    Bordetella pertussis pcr Not Detected Not Detected    Bordetella parapertussis PCR Not Detected Not Detected    Chlamydophila pneumoniae PCR Not Detected Not Detected    Mycoplasma pneumo by PCR Not Detected Not Detected   POC Glucose Once    Collection Time: 08/13/20  2:16 PM   Result Value Ref Range    Glucose 177 (H) 70 - 130 mg/dL   Blood Gas, Arterial    Collection Time: 08/13/20  2:17 PM   Result Value Ref Range    Site Arterial: right radial     Jarocho's Test Positive     pH, Arterial 7.455 (H) 7.350 - 7.450 pH units    pCO2, Arterial 36.7 35.0 - 45.0 mm Hg    pO2, Arterial 108.7 (H) 80.0 - 100.0 mm Hg    HCO3, Arterial 25.8 22.0 - 28.0 mmol/L    Base Excess, Arterial 2.1 (H) 0.0 - 2.0 mmol/L    O2 Saturation Calculated 98.5 92.0 - 99.0 %    A-a Gradiant 0.3 mmHg    Barometric Pressure for Blood Gas 751.9 mmHg    Modality Adult Vent     FIO2 60 %    Ventilator Mode AC     Set Tidal Volume 450     Set Mech Resp Rate 18     Rate 19 Breaths/minute    PEEP 5        Ordered the above labs and independently reviewed the results.        RADIOLOGY  Ct Head Without Contrast    Result Date: 8/13/2020  CT HEAD WITHOUT CONTRAST  HISTORY: Stroke.  A noncontrasted CT examination of the brain was performed. No prior CT is available for comparison.  FINDINGS: There is an  intraparenchymal hemorrhage involving the right frontal lobe with surrounding edema. The hemorrhage extends to the cortical surface laterally and superolaterally. Medially it abuts the frontal horn and there may be a small component of intraventricular extension. Hemorrhage then tracks posteriorly and medially to the basal ganglia. The largest component measures approximately 3.8 x 5.1 x 4.2 cm in size. The volume is estimated to be approximately 340 mL. The component extending to the basal ganglia measures approximately 5 x 12 x 12 mm in size anteriorly. Posteriorly the hematoma measures approximately 19 mm in craniocaudal dimension. There is 10-11 mm of midline shift to the left. There is no evidence of uncal herniation.      Intraparenchymal hemorrhage involving the right frontal lobe with the largest component measuring approximately 5 cm in size. There is surrounding edema. The hemorrhage extends to the cortical surface. There is mass effect on the right lateral ventricle and the hemorrhage abuts the right frontal horn. There may be a small volume of intraventricular blood. Hemorrhage also tracks posteriorly and medially to the basal ganglia as described above. There is approximately 10-11 mm of midline shift to the left.  The above information was called to and discussed with Dr. Clement and with Dr. Heard.    Radiation dose reduction techniques were utilized, including automated exposure control and exposure modulation based on body size.       Xr Chest 1 View    Result Date: 8/13/2020  XR CHEST 1 VW-  Clinical: Intubated  COMPARISON examination 1228 hours, current examination 1259 hours  FINDINGS: There has been interval placement of an endotracheal tube, the tip superimposes the tracheal air column located 2 cm above the jose j. This position is satisfactory.  Similar to the previous examination there are somewhat low lung volumes. There is cardiac enlargement. There is an indeterminate perihilar density  on the left similar to the previous examination. Likewise no interval change in the left base opacity. Despite the low inspiratory effort, there could be some vascular congestion.  This report was finalized on 8/13/2020 1:30 PM by Dr. Remberto Whelan M.D.      Xr Chest 1 View    Result Date: 8/13/2020  XR CHEST 1 VW-  Clinical: Acute stroke protocol  Examination performed at 1228 hours with comparison 7/22/2020  FINDINGS: There is lower inspiratory effort on the current examination and the projection is apical lordotic. There is cardiac enlargement. Sizable left perihilar density seen and there is opacity at the left lung base partially obscuring the left hemidiaphragm. Perihilar density could be a mass, lymphadenopathy or consolidation/collapse. Left base process may represent infiltrate or atelectasis and not excluded. The right lung is clear. No right-sided effusion seen. There is atherosclerotic calcification of the aorta. The remainder is examination is unremarkable. PA and lateral view of the chest would be most helpful when the patient's condition permits.  This report was finalized on 8/13/2020 1:27 PM by Dr. Remberto Whelan M.D.        I ordered the above noted radiological studies. Independently reviewed by me and discussed with radiologist.  See dictation above for official radiology interpretation.      PROCEDURES    Critical Care  Performed by: Herman Heard MD  Authorized by: Herman Heard MD     Critical care provider statement:     Critical care time (minutes):  60    Critical care was necessary to treat or prevent imminent or life-threatening deterioration of the following conditions:  CNS failure or compromise, circulatory failure and respiratory failure (Coagulopathy)    Critical care was time spent personally by me on the following activities:  Development of treatment plan with patient or surrogate, discussions with consultants, discussions with primary provider, evaluation of patient's  response to treatment, examination of patient, obtaining history from patient or surrogate, ordering and performing treatments and interventions, ordering and review of radiographic studies, ordering and review of laboratory studies, pulse oximetry, re-evaluation of patient's condition and review of old charts    I assumed direction of critical care for this patient from another provider in my specialty: no    Intubation  Date/Time: 8/13/2020 3:47 PM  Performed by: Herman Heard MD  Authorized by: Herman Heard MD     Consent:     Consent obtained:  Verbal    Consent given by:  Patient and spouse  Pre-procedure details:     Patient status:  Altered mental status    Mallampati score:  III    Pretreatment meds: Etomidate.    Paralytics:  Rocuronium  Procedure details:     Preoxygenation:  Bag valve mask    CPR in progress: no      Intubation method:  Oral    Oral intubation technique:  Video-assisted    Laryngoscope blade:  Mac 4    Tube size (mm):  7.5    Tube type:  Cuffed    Number of attempts:  1    Ventilation between attempts: no      Cricoid pressure: yes      Tube visualized through cords: yes    Placement assessment:     ETT to lip:  23    Tube secured with:  ETT setven    Breath sounds:  Equal    Placement verification: chest rise, CXR verification, direct visualization, equal breath sounds, ETCO2 detector and tube exhalation      CXR findings:  ETT in proper place  Post-procedure details:     Patient tolerance of procedure:  Tolerated well, no immediate complications        EKG    EKG time: 1214  Rhythm/Rate: Normal sinus rhythm 69  No Acute Ischemia  Non-Specific ST-T changes    Unchanged compared to prior on 7/22/2020    Interpreted Contemporaneously by me.  Independently viewed by me      MEDICATIONS GIVEN IN ER    Medications   sodium chloride 0.9 % flush 10 mL (has no administration in time range)   propofol (DIPRIVAN) infusion 10 mg/mL 100 mL (10 mcg/kg/min × 107 kg Intravenous New Bag 8/13/20  1309)   levETIRAcetam in NaCl 0.75% (KEPPRA) IVPB 1,000 mg (0 mg Intravenous Stopped 8/13/20 1355)   niCARdipine (CARDENE) 25 mg in 250 mL NS (0.1 mg/mL) infusion kit (15 mg/hr Intravenous New Bag 8/13/20 1528)   mannitol 20 % infusion 50 g (50 g Intravenous New Bag 8/13/20 1522)   labetalol (NORMODYNE,TRANDATE) injection 20 mg (20 mg Intravenous Given 8/13/20 1516)   labetalol (NORMODYNE,TRANDATE) injection 20 mg (20 mg Intravenous Given 8/13/20 1538)   dextrose (GLUTOSE) oral gel 15 g (has no administration in time range)   dextrose (D50W) 25 g/ 50mL Intravenous Solution 25 g (has no administration in time range)   glucagon (human recombinant) (GLUCAGEN DIAGNOSTIC) injection 1 mg (has no administration in time range)   sodium chloride 0.9 % flush 10 mL (has no administration in time range)   sodium chloride 0.9 % flush 10 mL (has no administration in time range)   potassium chloride (MICRO-K) CR capsule 40 mEq (has no administration in time range)     Or   potassium chloride (KLOR-CON) packet 40 mEq (has no administration in time range)     Or   potassium chloride 10 mEq in 100 mL IVPB (has no administration in time range)   Magnesium Sulfate 2 gram Bolus, followed by 8 gram infusion (total Mg dose 10 grams)- Mg less than or equal to 1mg/dL (has no administration in time range)     Or   Magnesium Sulfate 2 gram / 50mL Infusion (GIVE X 3 BAGS TO EQUAL 6GM TOTAL DOSE) - Mg 1.1 - 1.5 mg/dl (has no administration in time range)     Or   Magnesium Sulfate 4 gram infusion- Mg 1.6-1.9 mg/dL (has no administration in time range)   potassium phosphate 45 mmol in sodium chloride 0.9 % 500 mL infusion (has no administration in time range)     Or   potassium phosphate 30 mmol in sodium chloride 0.9 % 250 mL infusion (has no administration in time range)     Or   potassium phosphate 15 mmol in sodium chloride 0.9 % 100 mL infusion (has no administration in time range)     Or   sodium phosphates 40 mmol in sodium chloride 0.9 %  500 mL IVPB (has no administration in time range)     Or   sodium phosphates 20 mmol in sodium chloride 0.9 % 250 mL IVPB (has no administration in time range)   insulin regular (humuLIN R,novoLIN R) injection 0-14 Units (has no administration in time range)   ondansetron (ZOFRAN) tablet 4 mg (has no administration in time range)     Or   ondansetron (ZOFRAN) injection 4 mg (has no administration in time range)   LORazepam (ATIVAN) injection 1 mg (has no administration in time range)   fentaNYL citrate (PF) (SUBLIMAZE) injection 75 mcg (has no administration in time range)   ondansetron (ZOFRAN) injection 4 mg (4 mg Intravenous Given 8/13/20 1208)   phytonadione (AQUA-MEPHYTON, VITAMIN K) 10 mg in sodium chloride 0.9 % 50 mL IVPB (0 mg Intravenous Stopped 8/13/20 1309)   prothrombin complex conc human (KCentra) IV solution 4,946 Units (4,946 Units Intravenous Given 8/13/20 1239)   labetalol (NORMODYNE,TRANDATE) injection 20 mg (20 mg Intravenous Given 8/13/20 1222)   etomidate (AMIDATE) injection 30 mg (30 mg Intravenous Given by Other 8/13/20 1254)   rocuronium (ZEMURON) injection 100 mg (100 mg Intravenous Given by Other 8/13/20 1255)   LORazepam (ATIVAN) injection 3 mg (3 mg Intravenous Given 8/13/20 1515)   HYDROmorphone (DILAUDID) injection 1 mg (1 mg Intravenous Given 8/13/20 1528)         PROGRESS, DATA ANALYSIS, CONSULTS, AND MEDICAL DECISION MAKING    All labs have been independently reviewed by me.  All radiology studies have been reviewed by me and discussed with radiologist dictating report.   EKG's independently reviewed by me.  Discussion below represents my analysis of pertinent findings related to patient's condition, differential diagnosis, treatment plan and final disposition.      ED Course as of Aug 13 1548   Thu Aug 13, 2020   1146 Patient presents with acute left sided weakness.  A team D was called upon arrival.  I am concerned the patient may actually have an intracranial hemorrhage due to  her symptoms and being on Coumadin for her DVTs.  I have discussed the case with Dr. Kwan from neurology and will proceed with a team D    [GP]   1200 I discussed the case with Dr. Hilton from radiology.  The patient has a large right frontal intraparenchymal hemorrhage with mass-effect and midline shift.  I have placed a stat call to neurosurgery and have spoken with the pharmacist and will give vitamin K while awaiting the patient's INR result.    [GP]   1213 Lab just notified us of the patient's INR is 8.  I have spoken with the pharmacist and we will give the patient IV vitamin K and IV Kcentra.  I just spoke with Gloria from neurosurgery and she is on her way to see the patient and notifying Dr. Boucher    [GP]   1218 I just spoke with the patient has been given and notified them that her INR is 8 and that we will be using reversal agents.  Her blood pressure is 150/110 and thus I will order her dose of labetalol.  Currently the patient's mental status is sleepy but arousable and she is maintaining her airway.    [GP]   1231 I discussed the case with Dr. Mckoy from the ICU who will admit her and is aware that she is receiving the vitamin K, Kcentra and that neurosurgery is at bedside.    [GP]   1237 Upon reevaluation with neurosurgery at this time.  The patient is becoming more somnolent but arousable.  I discussed with the patient, her  and neurosurgery and feel that the patient needs to be intubated for airway control and will start a Cardene drip for blood pressure control.    [GP]   1304 Patient intubated by me.  I will order a full CAPR, gown, gloves and full COVID PPE  I used etomidate and rocuronium and then used the glide scope with a 4 MAC and passed a 7.5 ETT on the first pass.  There was a positive end-tidal CO2 change positive bilateral breath sounds and sats increased from 91% to 100%.  Chest x-ray is been ordered.    [GP]      ED Course User Index  [GP] Herman Heard MD           The  differential diagnosis includes but is not limited to acute stroke, intracranial hemorrhage, coagulopathy, hypertensive urgency, head trauma        AS OF 15:48 VITALS:    BP - 180/95  HR - 100  TEMP - 97.8 °F (36.6 °C) (Oral)  02 SATS - 97%        DIAGNOSIS  Final diagnoses:   Intraparenchymal hemorrhage of brain (CMS/HCC)   Elevated INR   Hypertension, unspecified type         DISPOSITION  ADMISSION    Discussed treatment plan and reason for admission with pt/family and admitting physician.  Pt/family voiced understanding of the plan for admission for further testing/treatment as needed.        EMR Dragon/Transcription disclaimer:   Much of this encounter note is an electronic transcription/translation of spoken language to printed text. The electronic translation of spoken language may permit erroneous, or at times, nonsensical words or phrases to be inadvertently transcribed; Although I have reviewed the note for such errors, some may still exist.       No scribe was used     Herman Heard MD  08/13/20 7932

## 2020-08-13 NOTE — CONSULTS
[facility] NEUROSURGERY CONSULT NOTE    Patient name: Afia Bernstein  Referring Provider: Dr. Heard  Reason for Consultation: [facility]    Patient Care Team:  Jorge Abel MD as PCP - General (Family Medicine)  Moisés Christine MD as Consulting Physician (Gastroenterology)    Chief complaint: mental status changes    Subjective .     History of present illness:    Patient is a 64 y.o. right handed female brought in by EMS due to mental status changes and left-sided weakness.  She is able to answer some questions but is quite lethargic.  Her  states that she has had some nausea for the past few days.  She was recently hospitalized for DVT, PE.  She was on Eliquis and this was changed to Coumadin due to treatment failure.  He states that she had an INR checked yesterday that was high and she was told to hold her Coumadin.  Yesterday evening she reported a headache prior to going to bed.  Around 1 AM he awoke to her sitting on the side of the bed attempting to get her leg into the bed and telling him she could not get herself back into the bed.  He assisted her and they went back to sleep.  Around 330 or so, he awoke again and found her out of bed on the floor.  He was able to help her up and she walked to the bathroom.  Sometime late in the morning he was out of the bedroom and heard her beating on something to get his attention.  He found her again on the floor and one is unable to help her up.  He called his son who is a  as well as EMS.  She had an episode of nausea and vomiting and was somewhat sleepy for him.  ER physician reports persistent progressive somnolence with complaint of headache.  She was found to have intraparenchymal hemorrhage on CT scan with supratherapeutic INR of 8.2.  She is receiving vitamin K and Kcentra has been ordered.  We have been consulted to evaluate.    Review of Systems  Review of Systems   Unable to perform ROS: Acuity of condition (Difficult to obtain any  further review of systems from the patient due to somnolence)   Constitutional: Negative for fever.   Gastrointestinal: Positive for nausea and vomiting.   Musculoskeletal: Positive for gait problem.   Neurological: Positive for speech difficulty, weakness and headaches.       History  PAST MEDICAL HISTORY  Past Medical History:   Diagnosis Date   • CKD (chronic kidney disease) stage 3, GFR 30-59 ml/min (CMS/[facility])    • Deep vein thrombosis (DVT) of proximal vein of both lower extremities (CMS/[facility])    • Disease of thyroid gland    • Fibromyalgia    • Hypertension    • Morbid obesity (CMS/[facility])    • Pulmonary embolism, bilateral (CMS/[facility])        PAST SURGICAL HISTORY  Past Surgical History:   Procedure Laterality Date   • APPENDECTOMY     • CARDIAC CATHETERIZATION Bilateral 7/9/2020    Procedure: Pulmonary angiography;  Surgeon: Alejandra Bright MD;  Location:  [facility];  Service: Cardiovascular;  Laterality: Bilateral;   • CARDIAC CATHETERIZATION N/A 7/9/2020    Procedure: Thrombolytic Therapy-via EKOS;  Surgeon: Alejandra Bright MD;  Location:  [facility];  Service: Cardiovascular;  Laterality: N/A;   • CARDIAC CATHETERIZATION N/A 7/9/2020    Procedure: Right Heart Cath;  Surgeon: Alejandra Bright MD;  Location:  [facility];  Service: Cardiovascular;  Laterality: N/A;   • CHOLECYSTECTOMY     • COLONOSCOPY     • EKOS CATHETER PLACEMENT Bilateral 7/9/2020    Procedure: Ekos catheter placement;  Surgeon: Alejandra Bright MD;  Location:  [facility];  Service: Cardiovascular;  Laterality: Bilateral;   • KNEE SURGERY Right        FAMILY HISTORY  Family History   Problem Relation Age of Onset   • Cancer Neg Hx        SOCIAL HISTORY  Social History     Tobacco Use   • Smoking status: Never Smoker   • Smokeless tobacco: Never Used   Substance Use Topics   • Alcohol use: No   • Drug use: No       retired  Lives at home with her      Allergies:  Phenergan [promethazine hcl]    MEDICATIONS:    Current Facility-Administered Medications:   •  levETIRAcetam in NaCl 0.75% (KEPPRA) IVPB 1,000 mg, 1,000 mg, Intravenous, Q12H, Bia Nevarez APRN, Stopped at 08/13/20 1355  •  mannitol 20 % infusion 50 g, 50 g, Intravenous, Q6H, Bia Nevarez APRN  •  niCARdipine (CARDENE) 25 mg in 250 mL NS (0.1 mg/mL) infusion kit, 5-15 mg/hr, Intravenous, Titrated, Herman Heard MD, Last Rate: 150 mL/hr at 08/13/20 1416, 15 mg/hr at 08/13/20 1416  •  propofol (DIPRIVAN) infusion 10 mg/mL 100 mL, 5-50 mcg/kg/min, Intravenous, Titrated, Herman Heard MD, Last Rate: 6.42 mL/hr at 08/13/20 1309, 10 mcg/kg/min at 08/13/20 1309  •  sodium chloride 0.9 % flush 10 mL, 10 mL, Intravenous, PRN, Herman Heard MD    Current Outpatient Medications:   •  amLODIPine (NORVASC) 10 MG tablet, Take 1 tablet by mouth Daily., Disp: 30 tablet, Rfl: 6  •  enoxaparin (LOVENOX) 120 MG/0.8ML solution syringe, Inject 0.73 mL under the skin into the appropriate area as directed Every 12 (Twelve) Hours. Indications: DVT/PE (active thrombosis), Disp: 16 mL, Rfl: 0  •  losartan-hydrochlorothiazide (HYZAAR) 100-25 MG per tablet, Take 1 tablet by mouth Daily., Disp: , Rfl:   •  traMADol-acetaminophen (ULTRACET) 37.5-325 MG per tablet, Take 1 tablet by mouth Every 4 (Four) Hours As Needed for Moderate Pain ., Disp: , Rfl:   •  warfarin (COUMADIN) 5 MG tablet, Take 1 tablet by mouth Every Night. Indications: DVT/PE (active thrombosis), Disp: 30 tablet, Rfl: 0      Objective     Results Review:  LABS:  Results from last 7 days   Lab Units 08/13/20  1141   WBC 10*3/mm3 10.95*   HEMOGLOBIN g/dL 13.4   HEMATOCRIT % 40.6   PLATELETS 10*3/mm3 260     Results from last 7 days   Lab Units 08/13/20  1141   SODIUM mmol/L 136   POTASSIUM mmol/L 3.8   CHLORIDE mmol/L 100   CO2 mmol/L 24.8   BUN mg/dL 19   CREATININE mg/dL 1.25*   CALCIUM mg/dL 9.4   BILIRUBIN mg/dL 0.4   ALK PHOS U/L  66   ALT (SGPT) U/L 18   AST (SGOT) U/L 17   GLUCOSE mg/dL 169*     Results from last 7 days   Lab Units 08/13/20  1141   INR  8.27*     Resp PCR- negative     DIAGNOSTICS:  CTH-large acute right frontal intraparenchymal hemorrhage with mass-effect and midline shift    Results Review:   I reviewed the patient's new clinical results.  I personally viewed and interpreted the patient's CT head.  Also reviewed by    Vital Signs   Temp:  [97.8 °F (36.6 °C)] 97.8 °F (36.6 °C)  Heart Rate:  [58-80] 80  Resp:  [18-19] 19  BP: (145-187)/() 180/95  FiO2 (%):  [60 %] 60 %    Physical Exam:  Physical Exam   Constitutional: She appears well-developed and well-nourished. She appears lethargic. She has a sickly appearance.   Obese female   Eyes: Pupils are equal, round, and reactive to light. EOM are normal.   Neck: Neck supple. Carotid bruit is not present.   Cardiovascular: Normal rate, regular rhythm and normal heart sounds.   No murmur heard.  Pulmonary/Chest: Effort normal and breath sounds normal.   Abdominal: Soft. Bowel sounds are normal. She exhibits no mass. There is no tenderness.   Neurological: She appears lethargic. She has a normal Finger-Nose-Finger Test (On right, untestable left due to weakness).   Psychiatric: Her affect is blunt. Her speech is delayed and slurred. She is withdrawn.   Vitals reviewed.    Neurologic Exam     Mental Status   Follows 2 step commands.   Attention: decreased. Concentration: decreased.   Speech: slurred   Level of consciousness: drowsy  General knowledge: Limited.   Normal comprehension.     Cranial Nerves     CN II   Right visual field deficit: upper nasal and lower nasal quadrant(s)  Left visual field deficit: upper temporal and lower temporal quadrant(s)    CN III, IV, VI   Pupils are equal, round, and reactive to light.  Extraocular motions are normal.   Right pupil: Size: 3 mm. Shape: regular. Reactivity: brisk.   Left pupil: Size: 3 mm. Shape: regular. Reactivity:  sluggish.   CN III: no CN III palsy  CN VI: no CN VI palsy  Nystagmus: none     CN V   Facial sensation intact.     CN VII   Left facial weakness: central (Mild lower facial)    CN VIII   CN VIII normal.     CN IX, X   CN IX normal.   CN X normal.     CN XI   Right sternocleidomastoid strength: normal  Left sternocleidomastoid strength: weak  Right trapezius strength: normal  Left trapezius strength: weak    CN XII   Tongue: not atrophic  Fasciculations: absent  Tongue deviation: left    Motor Exam   Right arm pronator drift: absent  Left arm pronator drift: present (Arm drift greater than leg)  Right side normal strength, left leg 4/5, left arm proximal 3/5 distal 1/5     Sensory Exam   Right arm light touch: normal  Right leg light touch: normal    Diminished but intact to light touch left side     Gait, Coordination, and Reflexes     Gait  Gait: (Patient critical-not safe)    Coordination   Finger to nose coordination: normal (On right, untestable left due to weakness)    Reflexes   Right plantar: upgoing  Left plantar: upgoing      Assessment/Plan       Intraparenchymal hemorrhage of brain (CMS/HCC)    Supratherapeutic INR      PLAN: Patient with headache last night and multiple falls at home throughout the night.  Progressive weakness and somnolence.  Large right frontal intraparenchymal hemorrhage in setting of supratherapeutic Coumadin for recent DVT and PE.  She had an extensive PE which required EKOS.  Able to move left side but much weaker than right.  Seems to have a left homonymous hemianopsia as well.  Does report headache and speech is slurred.  She is able to converse minimally.  ER doctor states that she has become more somnolent over the time of her stay.  Planning to intubate for airway protection.  Receiving vitamin K and Kcentra has just arrived and is getting started.  Systolic blood pressure in 180s.  Asked for systolic to be maintained less than 160.  Cardene initiated.  Family apprised that  "there is nothing surgical to offer at this time due to the elevated INR.  Discussed with  that this could be a mortal injury.  We are hopeful to stop the bleeding with reversal of her anticoagulant, but it does increase her risk of PE.  We will consult her hematologist.  Her  states that she would want to be a full code at this point.  Repeat INR after completion of Kcentra.  Repeat CT head in 6 hours or sooner if status changes.  Patient evaluated in ER following intubation by Dr. Boucher as well.    I discussed the patient's findings and my recommendations with patient, family, nursing staff and Dr. Boucher and Dr. Orquidea Nevarez, APRN  08/13/20  14:39    \"Dictated utilizing Dragon dictation\".      "

## 2020-08-14 NOTE — PLAN OF CARE
Pt remains neurologically unchanged- she does follow commands and squeezed my hand on the left a few times- she is not able to hold it up off the bed. At 0400 she wiggled toes on the left and pushed/pulled against my hand. Cardene is currently off and SBP is 120's. Pt has been quite restless- she c/o a headache a few times- fentanyl given. Propofol restarted at a low dose to help pt comply with ventilator. AM head CT remains the same. PICC nurse not able to come this shift- plans for dayshift to come in and place PICC.

## 2020-08-14 NOTE — PROGRESS NOTES
Dr. TRACE Mckoy    [facility] INTENSIVE CARE    8/14/2020    Patient ID:  Name:  Afia Bernstein  MRN:  [id number]  1955  64 y.o.  female            CC/Reason for visit: Intraparenchymal hemorrhage of the brain    Interval hx: Patient is intubated, occasionally she will move spontaneously.  She will occasionally follow some simple commands.  She has not open her eyes yet.    ROS: Unobtainable, intubated    Mechanical ventilator settings were reviewed and adjusted by me    Vitals:  Vitals:    08/14/20 1045 08/14/20 1100 08/14/20 1103 08/14/20 1115   BP: 135/76 130/70  127/75   Pulse: 80 85 84 87   Resp:   20    Temp:       TempSrc:       SpO2: 95% 91% 93% 93%   Weight:         FiO2 (%): 41 %     Body mass index is 45.15 kg/m².    Intake/Output Summary (Last 24 hours) at 8/14/2020 1251  Last data filed at 8/14/2020 0531  Gross per 24 hour   Intake 2500.9 ml   Output 1250 ml   Net 1250.9 ml       Exam:  GEN:  Patient intubated  Intubated, mechanically ventilated.  Oral exam shows endotracheal tube in good position  She follows a few simple commands.  LUNGS: Clear breath sounds bilat, no use of accessory muscles  CV:  Normal S1S2, without murmur, no edema  ABD:  Non tender, no enlarged liver or masses      Scheduled meds:    chlorhexidine 15 mL Mouth/Throat Q12H   famotidine 20 mg Intravenous Q12H   insulin regular 0-14 Units Subcutaneous Q6H   levETIRAcetam 1,000 mg Intravenous Q6H   sodium chloride 10 mL Intravenous Q12H     IV meds:                        niCARdipine 5-15 mg/hr Last Rate: Stopped (08/14/20 0500)   propofol 5-50 mcg/kg/min Last Rate: 25 mcg/kg/min (08/14/20 1114)       Data Review:   I reviewed the patient's medications and new clinical results.    COVID19   Date Value Ref Range Status   08/13/2020 Not Detected Not Detected - Ref. Range Final         Lab Results   Component Value Date    CALCIUM 8.2 (L) 08/14/2020    PHOS 3.5 07/24/2020    MG 1.8 07/23/2020     Results from last 7 days    Lab Units 08/14/20  0815 08/14/20  0155 08/13/20  1846 08/13/20  1525 08/13/20  1141   SODIUM mmol/L 134* 133*  --   --  136   POTASSIUM mmol/L 3.9 3.7  --   --  3.8   CHLORIDE mmol/L 104 101  --   --  100   CO2 mmol/L 19.8* 24.4  --   --  24.8   BUN mg/dL 13 15  --   --  19   CREATININE mg/dL 1.12* 1.05*  --   --  1.25*   CALCIUM mg/dL 8.2* 8.6  --   --  9.4   BILIRUBIN mg/dL 0.8  --   --   --  0.4   ALK PHOS U/L 57  --   --   --  66   ALT (SGPT) U/L 18  --   --   --  18   AST (SGOT) U/L 20  --   --   --  17   GLUCOSE mg/dL 132* 128*  --   --  169*   WBC 10*3/mm3 9.00  --   --   --  10.95*   HEMOGLOBIN g/dL 11.2*  --   --   --  13.4   PLATELETS 10*3/mm3 218  --   --   --  260   INR   --   --  0.99 0.97 8.27*         ASSESSMENT:   Acute intracranial hemorrhage right frontal lobe  Brain swelling  Mild GABRIELLA  Brain compression  Seizure-like activity  Coagulopathy, on warfarin  Recent DVT with pulmonary embolism  Hypertension    PLAN:  Patient is critically ill.  She has a very large intracranial hemorrhage.  She is currently intubated, mechanically ventilated due to inability to protect her airway yesterday.  Her mental status decreased and ER physician had to intubate her rapidly.  Neurosurgery is following.  Repeat CT does not show any worsening of the bleed.  After transfusion of activated clotting factors her INR has normalized.  Her creatinine is mildly elevated today.  She has mild GABRIELLA.  We will increase IV fluids and monitor urine output.  We will try spontaneous awakening trial today, but we will not try weaning from the ventilator until tomorrow.  She did have significant brain compression with midline shift.  She will receive some mannitol every 6 hours.  Check serum osmolality.  Continue IV Cardene to keep systolic blood pressure below 160 mmHg.  Start pharmacologic stress ulcer prophylaxis.  Compression devices to the legs for DVT prophylaxis.  Avoid antiplatelets or anticoagulants.    Total critical care  time 32 minutes excluding any separately billable procedure time    Elier Mckoy MD  8/14/2020

## 2020-08-14 NOTE — CONSULTS
Patient Identification:  NAME:  Afia Bernstein  Age:  64 y.o.   Sex:  female   :  1955   MRN:  [id number]       Chief complaint: She does not have one /reason for consult seizure    History of present illness: Patient is 64-year-old right-handed white female whose history comes from the chart and from the  at bedside she has a history of the DVTs for which she has to take Coumadin fibromyalgia morbid obesity hypertension and comes to the hospital after she went to bed complaining of a headache and then had left-sided weakness.  She has had an intracranial hemorrhage on the right side in location duration is now been over 24 hours associated symptoms possibly a seizure, duration not known quality not described modifying factor she is on Keppra and has PRN Ativan which I called in emergently.  Follow-up CTs by my independent eyeball review shows a right hemisphere hemorrhage as noted.  Should be noted the patient despite being intubated at this time is following commands well with her right side.      Past medical history:  Past Medical History:   Diagnosis Date   • CKD (chronic kidney disease) stage 3, GFR 30-59 ml/min (CMS/HCC)    • Deep vein thrombosis (DVT) of proximal vein of both lower extremities (CMS/HCC)    • Disease of thyroid gland    • Fibromyalgia    • Hypertension    • Morbid obesity (CMS/HCC)    • Pulmonary embolism, bilateral (CMS/HCC)        Past surgical history:  Past Surgical History:   Procedure Laterality Date   • APPENDECTOMY     • CARDIAC CATHETERIZATION Bilateral 2020    Procedure: Pulmonary angiography;  Surgeon: Alejandra Bright MD;  Location:  [facility];  Service: Cardiovascular;  Laterality: Bilateral;   • CARDIAC CATHETERIZATION N/A 2020    Procedure: Thrombolytic Therapy-via EKOS;  Surgeon: Alejandra Bright MD;  Location:  [facility];  Service: Cardiovascular;  Laterality: N/A;   • CARDIAC CATHETERIZATION N/A 2020     Procedure: Right Heart Cath;  Surgeon: Alejandra Bright MD;  Location:  [facility];  Service: Cardiovascular;  Laterality: N/A;   • CHOLECYSTECTOMY     • COLONOSCOPY     • EKOS CATHETER PLACEMENT Bilateral 7/9/2020    Procedure: Ekos catheter placement;  Surgeon: Alejandra Bright MD;  Location:  [facility];  Service: Cardiovascular;  Laterality: Bilateral;   • KNEE SURGERY Right        Allergies:  Phenergan [promethazine hcl]    Home medications:  Medications Prior to Admission   Medication Sig Dispense Refill Last Dose   • amLODIPine (NORVASC) 10 MG tablet Take 1 tablet by mouth Daily. 30 tablet 6    • enoxaparin (LOVENOX) 120 MG/0.8ML solution syringe Inject 0.73 mL under the skin into the appropriate area as directed Every 12 (Twelve) Hours. Indications: DVT/PE (active thrombosis) 16 mL 0    • losartan-hydrochlorothiazide (HYZAAR) 100-25 MG per tablet Take 1 tablet by mouth Daily.   Taking   • traMADol-acetaminophen (ULTRACET) 37.5-325 MG per tablet Take 1 tablet by mouth Every 4 (Four) Hours As Needed for Moderate Pain .   Taking   • warfarin (COUMADIN) 5 MG tablet Take 1 tablet by mouth Every Night. Indications: DVT/PE (active thrombosis) 30 tablet 0         Hospital medications:    insulin regular 0-14 Units Subcutaneous Q6H   levETIRAcetam 1,000 mg Intravenous Q6H   sodium chloride 10 mL Intravenous Q12H       niCARdipine 5-15 mg/hr Last Rate: Stopped (08/14/20 0500)   propofol 5-50 mcg/kg/min Last Rate: 25 mcg/kg/min (08/14/20 1114)     •  acetaminophen  •  dextrose  •  dextrose  •  fentaNYL citrate (PF)  •  glucagon (human recombinant)  •  labetalol  •  labetalol  •  LORazepam  •  LORazepam  •  magnesium sulfate **OR** magnesium sulfate **OR** magnesium sulfate  •  ondansetron **OR** ondansetron  •  potassium chloride **OR** potassium chloride **OR** potassium chloride  •  potassium phosphate infusion greater than 15 mMoles **OR** potassium phosphate infusion greater than  15 mMoles **OR** potassium phosphate **OR** sodium phosphate IVPB **OR** sodium phosphate IVPB  •  sodium chloride  •  sodium chloride    Family history:  Family History   Problem Relation Age of Onset   • Cancer Neg Hx        Social history:  Social History     Tobacco Use   • Smoking status: Never Smoker   • Smokeless tobacco: Never Used   Substance Use Topics   • Alcohol use: No   • Drug use: No       Review of systems:    Cannot be performed and the  is not really able to give me any pertinent review of systems she was doing well before this.  He is never had a known stroke in her life is never had seizures in her life the review of systems is taken fully from the chart and noted    Objective:  Vitals Ranges:   Temp:  [97.8 °F (36.6 °C)-101.5 °F (38.6 °C)] 101.5 °F (38.6 °C)  Heart Rate:  [] 87  Resp:  [19-22] 20  BP: ()/() 127/75  FiO2 (%):  [40 %-100 %] 41 %      Physical Exam: Patient is on a ventilator eyes are closed but she is following commands well shakes her head yes no moves the right side very well to commands so she is not a phasic language cannot be tested she is moderately alert although she has her eyes closed.  Fund of knowledge could not be tested recent remote memory cannot be tested her attention span is fair she is following commands and I asked her to wave bye and she waves bye with her right hand.  Pupils 2 constricting to one 1/2 eyes are conjugate decreased left nasolabial fold she would not follow other cranial nerve testing I did get her to open her eyes and close her eyes for me.  Motor no movement on the left side left lower extremity is externally rotated good strength and movement on the right side normal tone no atrophy fasciculations rigidity or resting tremor  good good toe wiggle on the right side to commands reflexes trace throughout right toe downgoing left toes mute but not definitely upgoing sensation coordination station and gait obviously  cannot be tested heart is regular without murmur neck supple without bruits extremities no clubbing cyanosis or significant edema visual acuity she did blink to threat    Results review:   I reviewed the patient's new clinical results.    Data review:  Lab Results (last 24 hours)     Procedure Component Value Units Date/Time    POC Glucose Once [[id number]]  (Normal) Collected:  08/14/20 1128    Specimen:  Blood Updated:  08/14/20 1139     Glucose 126 mg/dL     Osmolality, Serum [[id number]]  (Abnormal) Collected:  08/14/20 0814    Specimen:  Blood Updated:  08/14/20 0925     Osmolality 304 mOsm/kg     Comprehensive Metabolic Panel [[id number]]  (Abnormal) Collected:  08/14/20 0815    Specimen:  Blood Updated:  08/14/20 0913     Glucose 132 mg/dL      BUN 13 mg/dL      Creatinine 1.12 mg/dL      Sodium 134 mmol/L      Potassium 3.9 mmol/L      Chloride 104 mmol/L      CO2 19.8 mmol/L      Calcium 8.2 mg/dL      Total Protein 6.2 g/dL      Albumin 2.90 g/dL      ALT (SGPT) 18 U/L      AST (SGOT) 20 U/L      Alkaline Phosphatase 57 U/L      Total Bilirubin 0.8 mg/dL      eGFR Non African Amer 49 mL/min/1.73      Globulin 3.3 gm/dL      A/G Ratio 0.9 g/dL      BUN/Creatinine Ratio 11.6     Anion Gap 10.2 mmol/L     Narrative:       GFR Normal >60  Chronic Kidney Disease <60  Kidney Failure <15      CBC (No Diff) [[id number]]  (Abnormal) Collected:  08/14/20 0815    Specimen:  Blood Updated:  08/14/20 0906     WBC 9.00 10*3/mm3      RBC 3.52 10*6/mm3      Hemoglobin 11.2 g/dL      Hematocrit 33.6 %      MCV 95.5 fL      MCH 31.8 pg      MCHC 33.3 g/dL      RDW 13.1 %      RDW-SD 45.8 fl      MPV 10.1 fL      Platelets 218 10*3/mm3     POC Glucose Once [[id number]]  (Abnormal) Collected:  08/14/20 0535    Specimen:  Blood Updated:  08/14/20 0537     Glucose 141 mg/dL     Osmolality, Serum [[id number]]  (Normal) Collected:  08/14/20 0155    Specimen:  Blood Updated:  08/14/20 0237     Osmolality 301 mOsm/kg     Basic Metabolic  Panel [[id number]]  (Abnormal) Collected:  08/14/20 0155    Specimen:  Blood Updated:  08/14/20 0232     Glucose 128 mg/dL      BUN 15 mg/dL      Creatinine 1.05 mg/dL      Sodium 133 mmol/L      Potassium 3.7 mmol/L      Chloride 101 mmol/L      CO2 24.4 mmol/L      Calcium 8.6 mg/dL      eGFR Non African Amer 53 mL/min/1.73      BUN/Creatinine Ratio 14.3     Anion Gap 7.6 mmol/L     Narrative:       GFR Normal >60  Chronic Kidney Disease <60  Kidney Failure <15      POC Glucose Once [[id number]]  (Normal) Collected:  08/13/20 2254    Specimen:  Blood Updated:  08/13/20 2256     Glucose 109 mg/dL     Osmolality, Serum [[id number]]  (Abnormal) Collected:  08/13/20 2138    Specimen:  Blood Updated:  08/13/20 2205     Osmolality 307 mOsm/kg     Protime-INR [[id number]]  (Normal) Collected:  08/13/20 1846    Specimen:  Blood Updated:  08/13/20 1946     Protime 13.0 Seconds      INR 0.99    POC Glucose Once [[id number]]  (Abnormal) Collected:  08/13/20 1754    Specimen:  Blood Updated:  08/13/20 1755     Glucose 140 mg/dL     Osmolality, Serum [[id number]]  (Normal) Collected:  08/13/20 1525    Specimen:  Blood Updated:  08/13/20 1638     Osmolality 300 mOsm/kg     Protime-INR [[id number]]  (Normal) Collected:  08/13/20 1525    Specimen:  Blood Updated:  08/13/20 1627     Protime 12.8 Seconds      INR 0.97    Osmolality, Serum [[id number]]  (Abnormal) Collected:  08/13/20 1525    Specimen:  Blood Updated:  08/13/20 1626     Osmolality 303 mOsm/kg     POC Glucose Once [[id number]]  (Abnormal) Collected:  08/13/20 1416    Specimen:  Blood Updated:  08/13/20 1428     Glucose 177 mg/dL     Blood Gas, Arterial [[id number]]  (Abnormal) Collected:  08/13/20 1417    Specimen:  Arterial Blood Updated:  08/13/20 1420     Site Arterial: right radial     Jarocho's Test Positive     pH, Arterial 7.455 pH units      pCO2, Arterial 36.7 mm Hg      pO2, Arterial 108.7 mm Hg      HCO3, Arterial 25.8 mmol/L      Base Excess, Arterial 2.1 mmol/L       O2 Saturation Calculated 98.5 %      A-a Gradiant 0.3 mmHg      Barometric Pressure for Blood Gas 751.9 mmHg      Modality Adult Vent     FIO2 60 %      Ventilator Mode AC     Set Tidal Volume 450     Set Mech Resp Rate 18     Rate 19 Breaths/minute      PEEP 5    COVID PRE-OP / PRE-PROCEDURE SCREENING ORDER (NO ISOLATION) - Swab, Nasopharynx [[id number]] Collected:  08/13/20 1213    Specimen:  Swab from Nasopharynx Updated:  08/13/20 1313    Narrative:       The following orders were created for panel order COVID PRE-OP / PRE-PROCEDURE SCREENING ORDER (NO ISOLATION) - Swab, Nasopharynx.  Procedure                               Abnormality         Status                     ---------                               -----------         ------                     Respiratory Panel PCR w/...[[id number]]  Normal              Final result                 Please view results for these tests on the individual orders.    Respiratory Panel PCR w/COVID-19(SARS-CoV-2) [facility] In-House, NP Swab in UTM/VTM, 3-4 HR TAT - Swab, Nasopharynx [[id number]]  (Normal) Collected:  08/13/20 1213    Specimen:  Swab from Nasopharynx Updated:  08/13/20 1313     ADENOVIRUS, PCR Not Detected     Coronavirus 229E Not Detected     Coronavirus HKU1 Not Detected     Coronavirus NL63 Not Detected     Coronavirus OC43 Not Detected     COVID19 Not Detected     Human Metapneumovirus Not Detected     Human Rhinovirus/Enterovirus Not Detected     Influenza A PCR Not Detected     Influenza A H1 Not Detected     Influenza A H1 2009 PCR Not Detected     Influenza A H3 Not Detected     Influenza B PCR Not Detected     Parainfluenza Virus 1 Not Detected     Parainfluenza Virus 2 Not Detected     Parainfluenza Virus 3 Not Detected     Parainfluenza Virus 4 Not Detected     RSV, PCR Not Detected     Bordetella pertussis pcr Not Detected     Bordetella parapertussis PCR Not Detected     Chlamydophila pneumoniae PCR Not Detected     Mycoplasma pneumo by PCR  Not Detected    Narrative:       Fact sheet for providers: https://docs.dooyoo/wp-content/uploads/NWP3165-5846-KO0.1-EUA-Provider-Fact-Sheet-3.pdf    Fact sheet for patients: https://[employer]dooyoo/wp-content/uploads/IWS1899-9781-GK6.1-EUA-Patient-Fact-Sheet-1.pdf  Fact sheet for providers: https://docs.dooyoo/wp-content/uploads/SPL1846-4026-IY0.1-EUA-Provider-Fact-Sheet-3.pdf    Fact sheet for patients: https://[employer].dooyoo/wp-content/uploads/GEL3014-5352-YY6.1-EUA-Patient-Fact-Sheet-1.pdf    [place] Draw [[id number]] Collected:  08/13/20 1141    Specimen:  Blood Updated:  08/13/20 1246    Narrative:       The following orders were created for panel order [place] Draw.  Procedure                               Abnormality         Status                     ---------                               -----------         ------                     Light Blue Top[[id number]]                                   Final result               Green Top (Gel)[[id number]]                                  Final result               Lavender Top[[id number]]                                     Final result               Gold Top - SST[[id number]]                                   Final result                 Please view results for these tests on the individual orders.    Light Blue Top [[id number]] Collected:  08/13/20 1141    Specimen:  Blood Updated:  08/13/20 1246     Extra Tube hold for add-on     Comment: Auto resulted       Green Top (Gel) [[id number]] Collected:  08/13/20 1141    Specimen:  Blood Updated:  08/13/20 1246     Extra Tube Hold for add-ons.     Comment: Auto resulted.       Lavender Top [[id number]] Collected:  08/13/20 1141    Specimen:  Blood Updated:  08/13/20 1246     Extra Tube hold for add-on     Comment: Auto resulted       Gold Top - SST [[id number]] Collected:  08/13/20 1141    Specimen:  Blood Updated:  08/13/20 1246     Extra Tube Hold for add-ons.     Comment: Auto resulted.        Protime-INR [[id number]]  (Abnormal) Collected:  08/13/20 1141    Specimen:  Blood Updated:  08/13/20 1214     Protime 64.9 Seconds      INR 8.27    aPTT [[id number]]  (Abnormal) Collected:  08/13/20 1141    Specimen:  Blood Updated:  08/13/20 1214     PTT 74.1 seconds     Comprehensive Metabolic Panel [[id number]]  (Abnormal) Collected:  08/13/20 1141    Specimen:  Blood Updated:  08/13/20 1210     Glucose 169 mg/dL      BUN 19 mg/dL      Creatinine 1.25 mg/dL      Sodium 136 mmol/L      Potassium 3.8 mmol/L      Chloride 100 mmol/L      CO2 24.8 mmol/L      Calcium 9.4 mg/dL      Total Protein 7.3 g/dL      Albumin 3.80 g/dL      ALT (SGPT) 18 U/L      AST (SGOT) 17 U/L      Alkaline Phosphatase 66 U/L      Total Bilirubin 0.4 mg/dL      eGFR Non African Amer 43 mL/min/1.73      Globulin 3.5 gm/dL      A/G Ratio 1.1 g/dL      BUN/Creatinine Ratio 15.2     Anion Gap 11.2 mmol/L     Narrative:       GFR Normal >60  Chronic Kidney Disease <60  Kidney Failure <15      Troponin [[id number]]  (Normal) Collected:  08/13/20 1141    Specimen:  Blood Updated:  08/13/20 1210     Troponin T <0.010 ng/mL     Narrative:       Troponin T Reference Range:  <= 0.03 ng/mL-   Negative for AMI  >0.03 ng/mL-     Abnormal for myocardial necrosis.  Clinicians would have to utilize clinical acumen, EKG, Troponin and serial changes to determine if it is an Acute Myocardial Infarction or myocardial injury due to an underlying chronic condition.       Results may be falsely decreased if patient taking Biotin.      CBC & Differential [[id number]] Collected:  08/13/20 1141    Specimen:  Blood Updated:  08/13/20 1155    Narrative:       The following orders were created for panel order CBC & Differential.  Procedure                               Abnormality         Status                     ---------                               -----------         ------                     CBC Auto Differential[[id number]]        Abnormal            Final result                  Please view results for these tests on the individual orders.    CBC Auto Differential [[id number]]  (Abnormal) Collected:  08/13/20 1141    Specimen:  Blood Updated:  08/13/20 1155     WBC 10.95 10*3/mm3      RBC 4.24 10*6/mm3      Hemoglobin 13.4 g/dL      Hematocrit 40.6 %      MCV 95.8 fL      MCH 31.6 pg      MCHC 33.0 g/dL      RDW 13.1 %      RDW-SD 46.6 fl      MPV 9.8 fL      Platelets 260 10*3/mm3      Neutrophil % 89.8 %      Lymphocyte % 4.7 %      Monocyte % 4.5 %      Eosinophil % 0.0 %      Basophil % 0.4 %      Immature Grans % 0.6 %      Neutrophils, Absolute 9.84 10*3/mm3      Lymphocytes, Absolute 0.51 10*3/mm3      Monocytes, Absolute 0.49 10*3/mm3      Eosinophils, Absolute 0.00 10*3/mm3      Basophils, Absolute 0.04 10*3/mm3      Immature Grans, Absolute 0.07 10*3/mm3      nRBC 0.0 /100 WBC            Imaging:  Imaging Results (Last 24 Hours)     Procedure Component Value Units Date/Time    CT Head Without Contrast [[id number]] Collected:  08/14/20 0457     Updated:  08/14/20 0506    Narrative:       CT HEAD WITHOUT CONTRAST     HISTORY: Intraparenchymal hematoma     COMPARISON: 08/13/2020     TECHNIQUE: Axial CT imaging was obtained through the brain. No IV  contrast was administered.     FINDINGS:  Large right frontal intraparenchymal hematoma is again seen. It is  stable in size at 5.3 x 4.3 cm. There is surrounding vasogenic edema.  There is significant mass effect on the right lateral ventricle, with  near complete effacement of the frontal horn. Midline shift of at least  4 to 5 mm mm is unchanged. There is stable intraventricular hemorrhage  noted as well. No new areas of hemorrhage are seen. There is  periventricular and deep white matter microangiopathic change.       Impression:       Stable appearance to large right frontal intraparenchymal hematoma, as  well as intraventricular hemorrhage.     Radiation dose reduction techniques were utilized, including  automated  exposure control and exposure modulation based on body size.     This report was finalized on 8/14/2020 5:02 AM by Dr. Zoie Granados M.D.       CT Head Without Contrast [[id number]] Collected:  08/13/20 2008     Updated:  08/13/20 2024    Narrative:       CT HEAD WO CONTRAST-     INDICATIONS: Intracranial hemorrhage, follow-up     TECHNIQUE: Radiation dose reduction techniques were utilized, including  automated exposure control and exposure modulation based on body size.  Noncontrast head CT     COMPARISON: 08/13/2020     FINDINGS:     Intraparenchymal hemorrhage in the right frontal lobe is redemonstrated,  does not appear significant changed at similar level, and taking into  account differences in positioning, the largest component measured at  4.9 x 4.8 cm on axial image 29. Posterior to this area of hemorrhage, an  area of intraparenchymal hemorrhage measures 3.7 x 1.7 cm on axial image  27, not significantly changed at similar level on the prior exam.  Surrounding edema is present, with mass effect, partial effacement of  the lateral ventricles. Leftward midline shift measures about 9 mm on  axial image 30, previously measured at 10-11 mm.     Slightly increased intraventricular blood is seen, layering in the  posterior horns of the lateral ventricles. The ventricles otherwise  appear stable.           Increased prominence of the superior ophthalmic veins (right more than  left) may reflect increased intracranial pressure.     Minimal mucosal thickening in right maxillary sinus. The visualized  paranasal sinuses, orbits, mastoid air cells are otherwise unremarkable.                   Impression:          The size of intraparenchymal hemorrhage does not appear significantly  changed. Slightly increased intraventricular blood. Leftward midline  shift appears similar to slightly decreased from the prior exam.  Increased prominence of the superior ophthalmic veins may reflect  increased  intracranial pressure.     This report was finalized on 8/13/2020 8:21 PM by Dr. Devin Esteves M.D.       CT Head Without Contrast [[id number]] Collected:  08/13/20 1229     Updated:  08/13/20 1715    Narrative:       CT HEAD WITHOUT CONTRAST     HISTORY: Stroke.     A noncontrasted CT examination of the brain was performed. No prior CT  is available for comparison.     FINDINGS: There is an intraparenchymal hemorrhage involving the right  frontal lobe with surrounding edema. The hemorrhage extends to the  cortical surface laterally and superolaterally. Medially it abuts the  frontal horn and there may be a small component of intraventricular  extension. Hemorrhage then tracks posteriorly and medially to the basal  ganglia. The largest component measures approximately 3.8 x 5.1 x 4.2 cm  in size. The volume is estimated to be approximately 340 mL. The  component extending to the basal ganglia measures approximately 5 x 12 x  12 mm in size anteriorly. Posteriorly the hematoma measures  approximately 19 mm in craniocaudal dimension. There is 10-11 mm of  midline shift to the left. There is no evidence of uncal herniation.       Impression:       Intraparenchymal hemorrhage involving the right frontal lobe  with the largest component measuring approximately 5 cm in size. There  is surrounding edema. The hemorrhage extends to the cortical surface.  There is mass effect on the right lateral ventricle and the hemorrhage  abuts the right frontal horn. There may be a small volume of  intraventricular blood. Hemorrhage also tracks posteriorly and medially  to the basal ganglia as described above. There is approximately 10-11 mm  of midline shift to the left.     The above information was called to and discussed with Dr. Clement and  with Dr. Heard.           Radiation dose reduction techniques were utilized, including automated  exposure control and exposure modulation based on body size.     This report was finalized  on 8/13/2020 5:12 PM by Dr. Murray Hilton M.D.       XR Chest 1 View [[id number]] Collected:  08/13/20 1327     Updated:  08/13/20 1333    Narrative:       XR CHEST 1 VW-     Clinical: Intubated     COMPARISON examination 1228 hours, current examination 1259 hours     FINDINGS: There has been interval placement of an endotracheal tube, the  tip superimposes the tracheal air column located 2 cm above the jose j.  This position is satisfactory.     Similar to the previous examination there are somewhat low lung volumes.  There is cardiac enlargement. There is an indeterminate perihilar  density on the left similar to the previous examination. Likewise no  interval change in the left base opacity. Despite the low inspiratory  effort, there could be some vascular congestion.     This report was finalized on 8/13/2020 1:30 PM by Dr. Remberto Whelan M.D.       XR Chest 1 View [[id number]] Collected:  08/13/20 1324     Updated:  08/13/20 1330    Narrative:       XR CHEST 1 VW-     Clinical: Acute stroke protocol     Examination performed at 1228 hours with comparison 7/22/2020     FINDINGS: There is lower inspiratory effort on the current examination  and the projection is apical lordotic. There is cardiac enlargement.  Sizable left perihilar density seen and there is opacity at the left  lung base partially obscuring the left hemidiaphragm. Perihilar density  could be a mass, lymphadenopathy or consolidation/collapse. Left base  process may represent infiltrate or atelectasis and not excluded. The  right lung is clear. No right-sided effusion seen. There is  atherosclerotic calcification of the aorta. The remainder is examination  is unremarkable. PA and lateral view of the chest would be most helpful  when the patient's condition permits.     This report was finalized on 8/13/2020 1:27 PM by Dr. Remberto Whelan M.D.                Assessment and Plan:       Intraparenchymal hemorrhage of brain (CMS/HCC)     Supratherapeutic INR  New onset seizure.  I like the Keppra that is being used and today would note that she is following commands very well on the right side.  We will continue to follow.  Thanks      Lopez Nicholson MD  08/14/20  11:48

## 2020-08-14 NOTE — PROGRESS NOTES
Discharge Planning Assessment  [facility]     Patient Name: Afia Bernstein  MRN: [id number]  Today's Date: 8/14/2020    Admit Date: 8/13/2020    Discharge Needs Assessment     Row Name 08/14/20 1558       Living Environment    Lives With  spouse    Current Living Arrangements  home/apartment/condo    Potentially Unsafe Housing Conditions  unable to assess    Primary Care Provided by  self;spouse/significant other    Provides Primary Care For  no one    Family Caregiver if Needed  spouse    Quality of Family Relationships  supportive    Able to Return to Prior Arrangements  yes       Resource/Environmental Concerns    Resource/Environmental Concerns  none    Transportation Concerns  car, none       Transition Planning    Patient/Family Anticipates Transition to  home with family    Patient/Family Anticipated Services at Transition  none    Transportation Anticipated  family or friend will provide       Discharge Needs Assessment    Concerns to be Addressed  no discharge needs identified    Equipment Currently Used at Home  none    Anticipated Changes Related to Illness  none    Equipment Needed After Discharge  none        Discharge Plan     Row Name 08/14/20 1558       Plan    Plan  undetermined    Plan Comments  IMM noted CCP spoke to patient's  Emiliano [phone]  at bedside to discuss discharge planning.  Face sheet verified.  CCP role explained   Pt emergency contact is her .  Pt PCP is Dr. Jorge Abel.  Pt lives in a house with her husbandd.  She uses a walker to ambulate.  She is independent with ADL's.  She has no past history of Home Health.  She has not been to rehab in the past.  Pt obtains his medications from [facility].  Plan is undetermined  R2R given for rehab choices    CCP following           Destination      Coordination has not been started for this encounter.      Durable Medical Equipment      Coordination has not been started for this encounter.       Dialysis/Infusion      Coordination has not been started for this encounter.      Home Medical Care      Coordination has not been started for this encounter.      Therapy      Coordination has not been started for this encounter.      Community Resources      Coordination has not been started for this encounter.          Demographic Summary    No documentation.       Functional Status    No documentation.       Psychosocial    No documentation.       Abuse/Neglect    No documentation.       Legal    No documentation.       Substance Abuse    No documentation.       Patient Forms    No documentation.           Kenna Oneal RN

## 2020-08-14 NOTE — PROGRESS NOTES
NEUROSURGERY PROGRESS NOTE     LOS: 1 day   Patient Care Team:  Jorge Abel MD as PCP - General (Family Medicine)  Moisés Christine MD as Consulting Physician (Gastroenterology)    Chief Complaint: Headache    Subjective     Interval History:     Patient's remained stable overnight following commands and complaining of headache on the ventilator.  She remains weak on the left compared to the right.  She is requiring sedation for comfort on the ventilator.  2 additional CT scans have been done since that admission CAT scan in the emergency room.    While in the room and during my examination of the patient I wore a mask and eye protection.  I washed my hands before and after this patient encounter.  The patient was also wearing a mask.     History taken from: RN    Objective      Vital Signs  Temp:  [97.8 °F (36.6 °C)-99 °F (37.2 °C)] 99 °F (37.2 °C)  Heart Rate:  [] 90  Resp:  [18-22] 20  BP: ()/() 129/70  FiO2 (%):  [40 %-60 %] 44 %  Body mass index is 45.15 kg/m².    Intake/Output last 3 shifts:  I/O last 3 completed shifts:  In: 2500.9 [I.V.:1950.9; IV Piggyback:550]  Out: 1250 [Urine:1250]    Intake/Output this shift:  No intake/output data recorded.    Physical    Patient sedated on the ventilator but the nurse had just given the sedation and said she followed commands with both upper extremities.  Her pupils are small at 3 mm and reactive.    Results Review:     I reviewed the patient's new clinical results.    Labs:    Lab Results (last 24 hours)     Procedure Component Value Units Date/Time    POC Glucose Once [[id number]]  (Abnormal) Collected:  08/13/20 1135    Specimen:  Blood Updated:  08/13/20 1140     Glucose 161 mg/dL     CBC & Differential [[id number]] Collected:  08/13/20 1141    Specimen:  Blood Updated:  08/13/20 1155    Narrative:       The following orders were created for panel order CBC & Differential.  Procedure                               Abnormality          Status                     ---------                               -----------         ------                     CBC Auto Differential[[id number]]        Abnormal            Final result                 Please view results for these tests on the individual orders.    Comprehensive Metabolic Panel [[id number]]  (Abnormal) Collected:  08/13/20 1141    Specimen:  Blood Updated:  08/13/20 1210     Glucose 169 mg/dL      BUN 19 mg/dL      Creatinine 1.25 mg/dL      Sodium 136 mmol/L      Potassium 3.8 mmol/L      Chloride 100 mmol/L      CO2 24.8 mmol/L      Calcium 9.4 mg/dL      Total Protein 7.3 g/dL      Albumin 3.80 g/dL      ALT (SGPT) 18 U/L      AST (SGOT) 17 U/L      Alkaline Phosphatase 66 U/L      Total Bilirubin 0.4 mg/dL      eGFR Non African Amer 43 mL/min/1.73      Globulin 3.5 gm/dL      A/G Ratio 1.1 g/dL      BUN/Creatinine Ratio 15.2     Anion Gap 11.2 mmol/L     Narrative:       GFR Normal >60  Chronic Kidney Disease <60  Kidney Failure <15      Protime-INR [[id number]]  (Abnormal) Collected:  08/13/20 1141    Specimen:  Blood Updated:  08/13/20 1214     Protime 64.9 Seconds      INR 8.27    aPTT [[id number]]  (Abnormal) Collected:  08/13/20 1141    Specimen:  Blood Updated:  08/13/20 1214     PTT 74.1 seconds     Troponin [[id number]]  (Normal) Collected:  08/13/20 1141    Specimen:  Blood Updated:  08/13/20 1210     Troponin T <0.010 ng/mL     Narrative:       Troponin T Reference Range:  <= 0.03 ng/mL-   Negative for AMI  >0.03 ng/mL-     Abnormal for myocardial necrosis.  Clinicians would have to utilize clinical acumen, EKG, Troponin and serial changes to determine if it is an Acute Myocardial Infarction or myocardial injury due to an underlying chronic condition.       Results may be falsely decreased if patient taking Biotin.      CBC Auto Differential [[id number]]  (Abnormal) Collected:  08/13/20 1141    Specimen:  Blood Updated:  08/13/20 1155     WBC 10.95 10*3/mm3      RBC 4.24 10*6/mm3       Hemoglobin 13.4 g/dL      Hematocrit 40.6 %      MCV 95.8 fL      MCH 31.6 pg      MCHC 33.0 g/dL      RDW 13.1 %      RDW-SD 46.6 fl      MPV 9.8 fL      Platelets 260 10*3/mm3      Neutrophil % 89.8 %      Lymphocyte % 4.7 %      Monocyte % 4.5 %      Eosinophil % 0.0 %      Basophil % 0.4 %      Immature Grans % 0.6 %      Neutrophils, Absolute 9.84 10*3/mm3      Lymphocytes, Absolute 0.51 10*3/mm3      Monocytes, Absolute 0.49 10*3/mm3      Eosinophils, Absolute 0.00 10*3/mm3      Basophils, Absolute 0.04 10*3/mm3      Immature Grans, Absolute 0.07 10*3/mm3      nRBC 0.0 /100 WBC     COVID PRE-OP / PRE-PROCEDURE SCREENING ORDER (NO ISOLATION) - Swab, Nasopharynx [[id number]] Collected:  08/13/20 1213    Specimen:  Swab from Nasopharynx Updated:  08/13/20 1313    Narrative:       The following orders were created for panel order COVID PRE-OP / PRE-PROCEDURE SCREENING ORDER (NO ISOLATION) - Swab, Nasopharynx.  Procedure                               Abnormality         Status                     ---------                               -----------         ------                     Respiratory Panel PCR w/...[[id number]]  Normal              Final result                 Please view results for these tests on the individual orders.    Respiratory Panel PCR w/COVID-19(SARS-CoV-2) [facility] In-House, NP Swab in UTM/VTM, 3-4 HR TAT - Swab, Nasopharynx [[id number]]  (Normal) Collected:  08/13/20 1213    Specimen:  Swab from Nasopharynx Updated:  08/13/20 1313     ADENOVIRUS, PCR Not Detected     Coronavirus 229E Not Detected     Coronavirus HKU1 Not Detected     Coronavirus NL63 Not Detected     Coronavirus OC43 Not Detected     COVID19 Not Detected     Human Metapneumovirus Not Detected     Human Rhinovirus/Enterovirus Not Detected     Influenza A PCR Not Detected     Influenza A H1 Not Detected     Influenza A H1 2009 PCR Not Detected     Influenza A H3 Not Detected     Influenza B PCR Not Detected      Parainfluenza Virus 1 Not Detected     Parainfluenza Virus 2 Not Detected     Parainfluenza Virus 3 Not Detected     Parainfluenza Virus 4 Not Detected     RSV, PCR Not Detected     Bordetella pertussis pcr Not Detected     Bordetella parapertussis PCR Not Detected     Chlamydophila pneumoniae PCR Not Detected     Mycoplasma pneumo by PCR Not Detected    Narrative:       Fact sheet for providers: https://Globial.[employer]/wp-content/uploads/GXL5792-6081-OP6.1-EUA-Provider-Fact-Sheet-3.pdf    Fact sheet for patients: https://Globial[employer]/wp-content/uploads/LOX6476-5799-BZ1.1-EUA-Patient-Fact-Sheet-1.pdf  Fact sheet for providers: https://Dealflicks/wp-content/uploads/UQU4169-6734-JX3.1-EUA-Provider-Fact-Sheet-3.pdf    Fact sheet for patients: https://Dealflicks/wp-content/uploads/FWK1351-8134-CV9.1-EUA-Patient-Fact-Sheet-1.pdf    POC Glucose Once [[id number]]  (Abnormal) Collected:  08/13/20 1416    Specimen:  Blood Updated:  08/13/20 1428     Glucose 177 mg/dL     Blood Gas, Arterial [[id number]]  (Abnormal) Collected:  08/13/20 1417    Specimen:  Arterial Blood Updated:  08/13/20 1420     Site Arterial: right radial     Jarocho's Test Positive     pH, Arterial 7.455 pH units      pCO2, Arterial 36.7 mm Hg      pO2, Arterial 108.7 mm Hg      HCO3, Arterial 25.8 mmol/L      Base Excess, Arterial 2.1 mmol/L      O2 Saturation Calculated 98.5 %      A-a Gradiant 0.3 mmHg      Barometric Pressure for Blood Gas 751.9 mmHg      Modality Adult Vent     FIO2 60 %      Ventilator Mode AC     Set Tidal Volume 450     Set Mech Resp Rate 18     Rate 19 Breaths/minute      PEEP 5    Osmolality, Serum [[id number]]  (Normal) Collected:  08/13/20 1525    Specimen:  Blood Updated:  08/13/20 1638     Osmolality 300 mOsm/kg     Protime-INR [[id number]]  (Normal) Collected:  08/13/20 1525    Specimen:  Blood Updated:  08/13/20 1627     Protime 12.8 Seconds      INR 0.97    Osmolality, Serum [[id number]]  (Abnormal)  Collected:  08/13/20 1525    Specimen:  Blood Updated:  08/13/20 1626     Osmolality 303 mOsm/kg     POC Glucose Once [[id number]]  (Abnormal) Collected:  08/13/20 1754    Specimen:  Blood Updated:  08/13/20 1755     Glucose 140 mg/dL     Protime-INR [[id number]]  (Normal) Collected:  08/13/20 1846    Specimen:  Blood Updated:  08/13/20 1946     Protime 13.0 Seconds      INR 0.99    Osmolality, Serum [[id number]]  (Abnormal) Collected:  08/13/20 2138    Specimen:  Blood Updated:  08/13/20 2205     Osmolality 307 mOsm/kg     POC Glucose Once [[id number]]  (Normal) Collected:  08/13/20 2254    Specimen:  Blood Updated:  08/13/20 2256     Glucose 109 mg/dL     Osmolality, Serum [[id number]]  (Normal) Collected:  08/14/20 0155    Specimen:  Blood Updated:  08/14/20 0237     Osmolality 301 mOsm/kg     Basic Metabolic Panel [[id number]]  (Abnormal) Collected:  08/14/20 0155    Specimen:  Blood Updated:  08/14/20 0232     Glucose 128 mg/dL      BUN 15 mg/dL      Creatinine 1.05 mg/dL      Sodium 133 mmol/L      Potassium 3.7 mmol/L      Chloride 101 mmol/L      CO2 24.4 mmol/L      Calcium 8.6 mg/dL      eGFR Non African Amer 53 mL/min/1.73      BUN/Creatinine Ratio 14.3     Anion Gap 7.6 mmol/L     Narrative:       GFR Normal >60  Chronic Kidney Disease <60  Kidney Failure <15      POC Glucose Once [[id number]]  (Abnormal) Collected:  08/14/20 0535    Specimen:  Blood Updated:  08/14/20 0537     Glucose 141 mg/dL           Imaging:    I personally reviewed the images from the following radiographic studies.    CT scan done last night and this morning in comparison to the admission CT scan yielded a stable result with a large right frontal intraparenchymal hematoma.  There is a slight amount of interventricular hemorrhage and some shift of the midline from right to left unchanged.    Current Medications:   Scheduled Meds:    insulin regular 0-14 Units Subcutaneous Q6H   levETIRAcetam 1,000 mg Intravenous Q6H   mannitol 50 g  Intravenous Q6H   sodium chloride 10 mL Intravenous Q12H     Continuous Infusions:    niCARdipine 5-15 mg/hr Last Rate: Stopped (08/14/20 0500)   propofol 5-50 mcg/kg/min Last Rate: 10 mcg/kg/min (08/14/20 0632)       Assessment/Plan       Intraparenchymal hemorrhage of brain (CMS/HCC)    Supratherapeutic INR      Plan: Patient's coagulopathy has been adequately corrected.  2 CT scans since admission have remained stable and the patient's clinical exam has remained stable with her following commands with weakness on the left upper extremity.  It appears we will be able to avoid an operative intervention at least in the short-term I will repeat a CT scan of the head again tomorrow for further follow-up.  We will see how she does after the first 48 hours and then consider weaning the ventilator if her mental status remains stable and she continues to prove to be able to protect her airway.      Lon Boucher MD  08/14/20  07:02

## 2020-08-14 NOTE — CONSULTS
Subjective     REASON FOR CONSULTATION: Intracranial hemorrhage in a patient anticoagulated due to recent PE  Provide an opinion on any further workup or treatment                             REQUESTING PHYSICIAN: Elier Mckoy MD    RECORDS OBTAINED:  Records of the patients history including those obtained from the referring provider were reviewed and summarized in detail.    HISTORY OF PRESENT ILLNESS:  The patient is a 64 y.o. year old female who is here for an opinion about the above issue.  She currently is admitted with intraparenchymal hemorrhage in the brain associated with Coumadin anticoagulation and supratherapeutic INR.  She had recently been hospitalized in late July due to DVT and pulmonary embolus in spite of Eliquis.  On that admission she was transitioned to Coumadin.  Her INR in the emergency room on 8/13/2020 was 8.27 with a pro time of 64.9 seconds.  She has received vitamin K and a dose of Kcentra.  Her INR is now normal.    Reviewing her labs from the prior admission her hypercoagulable work-up was unrevealing.    She currently is intubated and on a ventilator in the ICU    History of Present Illness     Past Medical History:   Diagnosis Date   • CKD (chronic kidney disease) stage 3, GFR 30-59 ml/min (CMS/[facility])    • Deep vein thrombosis (DVT) of proximal vein of both lower extremities (CMS/[facility])    • Disease of thyroid gland    • Fibromyalgia    • Hypertension    • Morbid obesity (CMS/[facility])    • Pulmonary embolism, bilateral (CMS/[facility])         Past Surgical History:   Procedure Laterality Date   • APPENDECTOMY     • CARDIAC CATHETERIZATION Bilateral 7/9/2020    Procedure: Pulmonary angiography;  Surgeon: Alejandra Bright MD;  Location:  [facility];  Service: Cardiovascular;  Laterality: Bilateral;   • CARDIAC CATHETERIZATION N/A 7/9/2020    Procedure: Thrombolytic Therapy-via EKOS;  Surgeon: Alejandra Bright MD;  Location:  [facility];  Service: Cardiovascular;   Laterality: N/A;   • CARDIAC CATHETERIZATION N/A 7/9/2020    Procedure: Right Heart Cath;  Surgeon: Alejandra Bright MD;  Location:  [facility];  Service: Cardiovascular;  Laterality: N/A;   • CHOLECYSTECTOMY     • COLONOSCOPY     • EKOS CATHETER PLACEMENT Bilateral 7/9/2020    Procedure: Ekos catheter placement;  Surgeon: Alejandra Bright MD;  Location:  [facility];  Service: Cardiovascular;  Laterality: Bilateral;   • KNEE SURGERY Right         No current facility-administered medications on file prior to encounter.      Current Outpatient Medications on File Prior to Encounter   Medication Sig Dispense Refill   • amLODIPine (NORVASC) 10 MG tablet Take 1 tablet by mouth Daily. 30 tablet 6   • enoxaparin (LOVENOX) 120 MG/0.8ML solution syringe Inject 0.73 mL under the skin into the appropriate area as directed Every 12 (Twelve) Hours. Indications: DVT/PE (active thrombosis) 16 mL 0   • losartan-hydrochlorothiazide (HYZAAR) 100-25 MG per tablet Take 1 tablet by mouth Daily.     • traMADol-acetaminophen (ULTRACET) 37.5-325 MG per tablet Take 1 tablet by mouth Every 4 (Four) Hours As Needed for Moderate Pain .     • warfarin (COUMADIN) 5 MG tablet Take 1 tablet by mouth Every Night. Indications: DVT/PE (active thrombosis) 30 tablet 0        ALLERGIES:    Allergies   Allergen Reactions   • Phenergan [Promethazine Hcl] Anxiety        Social History     Socioeconomic History   • Marital status:      Spouse name: Not on file   • Number of children: Not on file   • Years of education: Not on file   • Highest education level: Not on file   Tobacco Use   • Smoking status: Never Smoker   • Smokeless tobacco: Never Used   Substance and Sexual Activity   • Alcohol use: No   • Drug use: No   • Sexual activity: Defer        Family History   Problem Relation Age of Onset   • Cancer Neg Hx         Review of Systems     Objective     Vitals:    08/14/20 1300 08/14/20 1315 08/14/20 1330  08/14/20 1345   BP: 133/71 139/80 134/73 130/68   Pulse: 71 117 73 73   Resp:       Temp:       TempSrc:       SpO2: 95% 93% 94% 94%   Weight:         No flowsheet data found.    Physical Exam      RECENT LABS:  Hematology WBC   Date Value Ref Range Status   08/14/2020 9.00 3.40 - 10.80 10*3/mm3 Final     RBC   Date Value Ref Range Status   08/14/2020 3.52 (L) 3.77 - 5.28 10*6/mm3 Final     Hemoglobin   Date Value Ref Range Status   08/14/2020 11.2 (L) 12.0 - 15.9 g/dL Final     Hematocrit   Date Value Ref Range Status   08/14/2020 33.6 (L) 34.0 - 46.6 % Final     Platelets   Date Value Ref Range Status   08/14/2020 218 140 - 450 10*3/mm3 Final        Lab Results   Component Value Date    INR 0.99 08/13/2020    INR 0.97 08/13/2020    INR 8.27 (C) 08/13/2020    PROTIME 13.0 08/13/2020    PROTIME 12.8 08/13/2020    PROTIME 64.9 (C) 08/13/2020     Lab Results   Component Value Date    GLUCOSE 132 (H) 08/14/2020    BUN 13 08/14/2020    CREATININE 1.12 (H) 08/14/2020    EGFRIFNONA 49 (L) 08/14/2020    BCR 11.6 08/14/2020    K 3.9 08/14/2020    CO2 19.8 (L) 08/14/2020    CALCIUM 8.2 (L) 08/14/2020    ALBUMIN 2.90 (L) 08/14/2020    AST 20 08/14/2020    ALT 18 08/14/2020     CT HEAD WITHOUT CONTRAST  8/14/2020   HISTORY: Intraparenchymal hematoma     COMPARISON: 08/13/2020     TECHNIQUE: Axial CT imaging was obtained through the brain. No IV  contrast was administered.     FINDINGS:  Large right frontal intraparenchymal hematoma is again seen. It is  stable in size at 5.3 x 4.3 cm. There is surrounding vasogenic edema.  There is significant mass effect on the right lateral ventricle, with  near complete effacement of the frontal horn. Midline shift of at least  4 to 5 mm mm is unchanged. There is stable intraventricular hemorrhage  noted as well. No new areas of hemorrhage are seen. There is  periventricular and deep white matter microangiopathic change.     IMPRESSION:  Stable appearance to large right frontal  intraparenchymal hematoma, as  well as intraventricular hemorrhage.       Assessment/Plan   1.  Acute intraparenchymal brain bleed on Coumadin with supratherapeutic INR.  Patient's coagulopathy has been corrected with vitamin K and Kcentra therapy.  2.  Recent admission with DVT and bilateral pulmonary emboli in late July.  Obviously, patient is no longer a candidate for anticoagulation.  She had been switched to Coumadin from Eliquis due to Eliquis failure.  Her hypercoagulable work-up in the hospital was unrevealing.    Recommendations  1.  Obviously this a very difficult situation.  Patient has had a catastrophic bleed and is no longer a candidate for any anticoagulation.  2.  We will follow along peripherally and monitor coags and blood counts but a little else for us to offer at this time.  3.  If she survives the acute insult she could be considered for IVC filter placement in the future since she is no longer a candidate for anticoagulation.

## 2020-08-15 NOTE — PROGRESS NOTES
"  Subjective   REASON FOR CONSULTATION: Intracranial hemorrhage in a patient anticoagulated due to recent PE    HISTORY OF PRESENT ILLNESS:   The patient is a 64 y.o. female who is currently admitted with intraparenchymal hemorrhage in the brain associated with Coumadin anticoagulation and supratherapeutic INR.  She had recently been hospitalized in late July due to DVT and pulmonary embolus in spite of Eliquis.  On that admission she was transitioned to Coumadin.  Her INR in the emergency room on 8/13/2020 was 8.27 with a pro time of 64.9 seconds.  She has received vitamin K and a dose of Kcentra. Her INR is now normal.     Reviewing her labs from the prior admission her hypercoagulable work-up was unrevealing.     She currently is intubated and on a ventilator in the ICU    8/15/2020  She has been started on antibiotic therapy due to concern of developing pneumonia.  Neurosurgery feels that she could potentially be weaned from the ventilator.  Her INR remains normal currently.  History of Present Illness      Past Medical History, Past Surgical History, Social History, Family History have been reviewed and are without significant changes except as mentioned.    Review of Systems   Unobtainable as patient is intubated in the ICU  A comprehensive 14 point review of systems was performed and was negative except as mentioned.    Medications:  The current medication list was reviewed in the EMR    ALLERGIES:    Allergies   Allergen Reactions   • Phenergan [Promethazine Hcl] Anxiety       Objective      Vitals:    08/15/20 1040 08/15/20 1100 08/15/20 1200 08/15/20 1211   BP:  140/76 143/74    Pulse:  86 96    Resp:       Temp:    98.7 °F (37.1 °C)   TempSrc:    Oral   SpO2:  93% 91%    Weight:       Height: 154.9 cm (60.98\")        No flowsheet data found.    Physical Exam    64-year-old female intubated and on a ventilator in the ICU.  She is sedated.    RECENT LABS:  Hematology WBC   Date Value Ref Range Status "   08/15/2020 9.84 3.40 - 10.80 10*3/mm3 Final     RBC   Date Value Ref Range Status   08/15/2020 3.95 3.77 - 5.28 10*6/mm3 Final     Hemoglobin   Date Value Ref Range Status   08/15/2020 12.4 12.0 - 15.9 g/dL Final     Hematocrit   Date Value Ref Range Status   08/15/2020 37.0 34.0 - 46.6 % Final     Platelets   Date Value Ref Range Status   08/15/2020 234 140 - 450 10*3/mm3 Final            Lab Results   Component Value Date    INR 1.05 08/15/2020    INR 0.99 08/13/2020    INR 0.97 08/13/2020    PROTIME 13.6 08/15/2020    PROTIME 13.0 08/13/2020    PROTIME 12.8 08/13/2020       CT HEAD WITHOUT CONTRAST  8/14/2020   HISTORY: Intraparenchymal hematoma     COMPARISON: 08/13/2020     TECHNIQUE: Axial CT imaging was obtained through the brain. No IV  contrast was administered.     FINDINGS:  Large right frontal intraparenchymal hematoma is again seen. It is  stable in size at 5.3 x 4.3 cm. There is surrounding vasogenic edema.  There is significant mass effect on the right lateral ventricle, with  near complete effacement of the frontal horn. Midline shift of at least  4 to 5 mm mm is unchanged. There is stable intraventricular hemorrhage  noted as well. No new areas of hemorrhage are seen. There is  periventricular and deep white matter microangiopathic change.     IMPRESSION:  Stable appearance to large right frontal intraparenchymal hematoma, as  well as intraventricular hemorrhage.    Assessment/Plan   1.  Acute intraparenchymal brain bleed on Coumadin with supratherapeutic INR.  Patient's coagulopathy has been corrected with vitamin K and Kcentra therapy.  2.  Recent admission with DVT and bilateral pulmonary emboli in late July.  Obviously, patient is no longer a candidate for anticoagulation.  She had been switched to Coumadin from Eliquis due to Eliquis failure.  Her hypercoagulable work-up in the hospital was unrevealing.  3.  Intensivists are concerned she may be developing pneumonia.  Respiratory culture from  her ET tube was sent to the lab and she has been started on antibiotic therapy.     Recommendations  1.  Obviously this a very difficult situation.  Patient has had a catastrophic bleed and is no longer a candidate for any anticoagulation.  2.  We will follow along peripherally and monitor coags and blood counts but a little else for us to offer at this time.  3.  If she survives the acute insult she could be considered for IVC filter placement in the future since she is no longer a candidate for anticoagulation.                  8/15/2020      CC:

## 2020-08-15 NOTE — PROGRESS NOTES
Toño Montejo MD                          [phone]      Patient ID:    Name:  Afia Bernstein    MRN:  [id number]    1955   64 y.o.  female            Patient Care Team:  Jorge Abel MD as PCP - General (Family Medicine)  Moisés Christine MD as Consulting Physician (Gastroenterology)    CC/ Reason for visit: Acute left-sided weakness, large right intraparenchymal hemorrhage, acute respiratory failure    Subjective: Pt seen and examined this AM.  Patient remains on mechanical ventilator unfortunately still with left hemiplegia.  Repeat CT head shows persistent with mild worsening right intraparenchymal hemorrhage and midline shift.  Awaiting neurosurgery input this AM.  Ventilator settings have been adjusted.  Requiring significant support now.  Copious secretions noted to the ET tube.  Suspect pneumonia.  Will add antibiotics and send respiratory cultures and procalcitonin.    ROS: Unable to obtain due to respiratory failure    Objective     Vital Signs past 24hrs    BP range: BP: (121-163)/(65-93) 141/90  Pulse range: Heart Rate:  [] 103  Resp rate range: Resp:  [20-27] 24  Temp range: Temp (24hrs), Av.1 °F (37.3 °C), Min:98.5 °F (36.9 °C), Max:99.9 °F (37.7 °C)      Ventilator/Non-Invasive Ventilation Settings (From admission, onward)     Start     Ordered    20 1712  Ventilator - AC/VC; (18); 40; 5; 450  Continuous     Question Answer Comment   Vent Mode AC/VC    Breath rate  18   FiO2 40    PEEP 5    Tidal Volume 450        20 1712    20 1544  Ventilator - AC/VC  Continuous,   Status:  Canceled     Question:  Vent Mode  Answer:  AC/VC    20 1543                Device (Oxygen Therapy): ventilator FiO2 (%): 41 %     110 kg (241 lb 13.5 oz); Body mass index is 45.7 kg/m².      Intake/Output Summary (Last 24 hours) at 8/15/2020 0815  Last data filed at 8/15/2020 0625  Gross per 24 hour   Intake 2037 ml   Output 2100 ml   Net  -63 ml       PHYSICAL EXAM   Constitutional: Middle aged pt in bed, awake and following commands on the right while on a mechanical ventilator, anxious  Head: - NCAT  Eyes: No pallor.  Anicteric sclerae, EOMI.  ENMT:   Endotracheal tube in place with copious thick no oral thrush. Dry MM.   NECK: Trachea midline, No thyromegaly, no palpable cervical lymphadenopathy  Heart: RRR, no murmur. No pedal edema   Lungs: MARY +, decreased breath sounds in the left base along with some rhonchi, no wheezes/ crackles heard    Abdomen: Soft.  Obese, no tenderness, guarding or rigidity. No palpable masses  Extremities: Extremities warm and well perfused. No cyanosis/ clubbing  Neuro: Conscious, following commands on the right, dense left hemiplegia  Psych: Mood and affect unable to obtain     Scheduled meds:    chlorhexidine 15 mL Mouth/Throat Q12H   famotidine 20 mg Intravenous Q12H   insulin regular 0-14 Units Subcutaneous Q6H   levETIRAcetam 1,000 mg Intravenous Q6H   sodium chloride 10 mL Intravenous Q12H       IV meds:                        niCARdipine 5-15 mg/hr Last Rate: 12.5 mg/hr (08/15/20 0814)   propofol 5-50 mcg/kg/min Last Rate: Stopped (08/15/20 0755)       Data Review:      Results from last 7 days   Lab Units 08/15/20  0540 08/14/20  0815 08/14/20  0155 08/13/20  1846 08/13/20  1525 08/13/20  1141   SODIUM mmol/L 139 134* 133*  --   --  136   POTASSIUM mmol/L 3.2* 3.9 3.7  --   --  3.8   CHLORIDE mmol/L 106 104 101  --   --  100   CO2 mmol/L 22.0 19.8* 24.4  --   --  24.8   BUN mg/dL 13 13 15  --   --  19   CREATININE mg/dL 1.15* 1.12* 1.05*  --   --  1.25*   CALCIUM mg/dL 8.8 8.2* 8.6  --   --  9.4   BILIRUBIN mg/dL  --  0.8  --   --   --  0.4   ALK PHOS U/L  --  57  --   --   --  66   ALT (SGPT) U/L  --  18  --   --   --  18   AST (SGOT) U/L  --  20  --   --   --  17   GLUCOSE mg/dL 132* 132* 128*  --   --  169*   WBC 10*3/mm3 9.84 9.00  --   --   --  10.95*   HEMOGLOBIN g/dL 12.4 11.2*  --   --   --  13.4      PLATELETS 10*3/mm3 234 218  --   --   --  260   INR  1.05  --   --  0.99 0.97 8.27*   PROBNP pg/mL 356.7  --   --   --   --   --        Lab Results   Component Value Date    CALCIUM 8.8 08/15/2020    PHOS 3.5 07/24/2020             Results from last 7 days   Lab Units 08/13/20  1417   PH, ARTERIAL pH units 7.455*   PO2 ART mm Hg 108.7*   PCO2, ARTERIAL mm Hg 36.7   HCO3 ART mmol/L 25.8        Results Review:    I have reviewed the relevant laboratory results and independently reviewed the chest imaging from this hospitalization including the available echocardiogram reports personally and summarized it if/ when appropriate below    Assessment    Acute left-sided weakness  Acute right frontal intracerebral hemorrhage  Brain compression with midline shift  Acute encephalopathy from above  Acute respiratory failure s/p mechanical ventilation  New fever  LLL atelectasis - concern for pna   Acute renal failure  Seizures  Coumadin coagulopathy s/p reversal  Hyponatremia-improved  Hypokalemia  Recent DVT/PE on anticoagulation 7/20 s/p EKOS   Hypertension  Morbid obesity    PLAN:  Patient remains on mechanical ventilator due to encephalopathy from severe right intraparenchymal hemorrhage with midline shift.  Mechanical ventilator settings have been adjusted.  ABG done previously reviewed.  Will need to await neurological improvement before we consider extubation  Repeat CT head noted and awaiting neurosurgery input regarding need for surgical intervention.  Will defer hypertonic saline versus mannitol to them.  Patient did have a new fever yesterday morning and this is of unclear etiology.  Chest x-ray did show left basilar atelectasis/pneumonia.  Will get procalcitonin and start Unasyn for possible aspiration pneumonia.  Unfortunately patient did have a submassive PE requiring [place] last month and currently not able to get anticoagulation.  We will get lower extremity venous duplex and consider IVC filter given that  she likely is not a candidate for anticoagulation anytime soon.  Renal failure has improved with volume resuscitation.  Appreciate oncology input and agree with recommendations  Appreciate neurology input and agree with Keppra for seizures  Blood pressure control per recommendation  Mechanical DVT prophylaxis/GI prophylaxis    Full code     Guarded prognosis     CC - 40 mins    I have discussed my findings and recommendations with patient and nursing staff.     Toño Montejo MD  8/15/2020

## 2020-08-15 NOTE — PROGRESS NOTES
NEUROSURGERY PROGRESS NOTE     LOS: 2 days   Patient Care Team:  Jorge Abel MD as PCP - General (Family Medicine)  Moisés Christine MD as Consulting Physician (Gastroenterology)    Chief Complaint: Headache    Subjective     Interval History:     Patient is hospital day 3 following the diagnosis of a large right intracerebral hemorrhage that occurred spontaneously due to supratherapeutic INR.  Patient is remained stable neurologically and radiographically with mechanical ventilation over the last 2 days.  She is on the ventilator and cannot communicate well but does follow commands briskly.    While in the room and during my examination of the patient I wore a mask and eye protection.  I washed my hands before and after this patient encounter.  The patient was also wearing a mask.     History taken from: patient RN    Objective      Vital Signs  Temp:  [98.5 °F (36.9 °C)-99.9 °F (37.7 °C)] 98.7 °F (37.1 °C)  Heart Rate:  [] 103  Resp:  [20-27] 24  BP: (125-163)/(66-93) 141/90  FiO2 (%):  [40 %-51 %] 51 %  Body mass index is 45.7 kg/m².    Intake/Output last 3 shifts:  I/O last 3 completed shifts:  In: 3924 [I.V.:3424; IV Piggyback:500]  Out: 3350 [Urine:3350]    Intake/Output this shift:  No intake/output data recorded.    Physical    Patient is less sedated on the ventilator this morning and opened her eyes to voice and follow commands on the right greater than left.  Pupils are equal and reactive at 3 mm  She has roving eye movements and does focus on who is speaking to her    Results Review:     I reviewed the patient's new clinical results.    Labs:    Lab Results (last 24 hours)     Procedure Component Value Units Date/Time    POC Glucose Once [[id number]]  (Normal) Collected:  08/14/20 1128    Specimen:  Blood Updated:  08/14/20 1139     Glucose 126 mg/dL     POC Glucose Once [[id number]]  (Normal) Collected:  08/14/20 1758    Specimen:  Blood Updated:  08/14/20 1759     Glucose 123 mg/dL      POC Glucose Once [[id number]]  (Normal) Collected:  08/14/20 2307    Specimen:  Blood Updated:  08/14/20 2308     Glucose 123 mg/dL     POC Glucose Once [[id number]]  (Normal) Collected:  08/15/20 0520    Specimen:  Blood Updated:  08/15/20 0521     Glucose 130 mg/dL     Basic Metabolic Panel [[id number]]  (Abnormal) Collected:  08/15/20 0540    Specimen:  Blood from Hand, Left Updated:  08/15/20 0644     Glucose 132 mg/dL      BUN 13 mg/dL      Creatinine 1.15 mg/dL      Sodium 139 mmol/L      Potassium 3.2 mmol/L      Chloride 106 mmol/L      CO2 22.0 mmol/L      Calcium 8.8 mg/dL      eGFR Non African Amer 47 mL/min/1.73      BUN/Creatinine Ratio 11.3     Anion Gap 11.0 mmol/L     Narrative:       GFR Normal >60  Chronic Kidney Disease <60  Kidney Failure <15      CBC & Differential [[id number]] Collected:  08/15/20 0540    Specimen:  Blood from Hand, Left Updated:  08/15/20 0638    Narrative:       The following orders were created for panel order CBC & Differential.  Procedure                               Abnormality         Status                     ---------                               -----------         ------                     CBC Auto Differential[[id number]]        Abnormal            Final result                 Please view results for these tests on the individual orders.    CBC Auto Differential [[id number]]  (Abnormal) Collected:  08/15/20 0540    Specimen:  Blood from Hand, Left Updated:  08/15/20 0638     WBC 9.84 10*3/mm3      RBC 3.95 10*6/mm3      Hemoglobin 12.4 g/dL      Hematocrit 37.0 %      MCV 93.7 fL      MCH 31.4 pg      MCHC 33.5 g/dL      RDW 12.9 %      RDW-SD 44.0 fl      MPV 10.1 fL      Platelets 234 10*3/mm3      Neutrophil % 86.4 %      Lymphocyte % 6.0 %      Monocyte % 6.4 %      Eosinophil % 0.0 %      Basophil % 0.2 %      Immature Grans % 1.0 %      Neutrophils, Absolute 8.50 10*3/mm3      Lymphocytes, Absolute 0.59 10*3/mm3      Monocytes, Absolute 0.63 10*3/mm3       "Eosinophils, Absolute 0.00 10*3/mm3      Basophils, Absolute 0.02 10*3/mm3      Immature Grans, Absolute 0.10 10*3/mm3      nRBC 0.0 /100 WBC     BNP [[id number]]  (Normal) Collected:  08/15/20 0540    Specimen:  Blood from Hand, Left Updated:  08/15/20 0644     proBNP 356.7 pg/mL     Narrative:       Among patients with dyspnea, NT-proBNP is highly sensitive for the detection of acute congestive heart failure. In addition NT-proBNP of <300 pg/ml effectively rules out acute congestive heart failure with 99% negative predictive value.    Results may be falsely decreased if patient taking Biotin.      Protime-INR [[id number]]  (Normal) Collected:  08/15/20 0540    Specimen:  Blood from Hand, Left Updated:  08/15/20 0632     Protime 13.6 Seconds      INR 1.05    Procalcitonin [[id number]]  (Abnormal) Collected:  08/15/20 0540    Specimen:  Blood from Hand, Left Updated:  08/15/20 0907     Procalcitonin 0.28 ng/mL     Narrative:       As a Marker for Sepsis (Non-Neonates):   1. <0.5 ng/mL represents a low risk of severe sepsis and/or septic shock.  1. >2 ng/mL represents a high risk of severe sepsis and/or septic shock.    As a Marker for Lower Respiratory Tract Infections that require antibiotic therapy:  PCT on Admission     Antibiotic Therapy             6-12 Hrs later  > 0.5                Strongly Recommended            >0.25 - <0.5         Recommended  0.1 - 0.25           Discouraged                   Remeasure/reassess PCT  <0.1                 Strongly Discouraged          Remeasure/reassess PCT      As 28 day mortality risk marker: \"Change in Procalcitonin Result\" (> 80 % or <=80 %) if Day 0 (or Day 1) and Day 4 values are available. Refer to http://www.[employer]-pct-calculator.com/   Change in PCT <=80 %   A decrease of PCT levels below or equal to 80 % defines a positive change in PCT test result representing a higher risk for 28-day all-cause mortality of patients diagnosed with severe sepsis or septic " shock.  Change in PCT > 80 %   A decrease of PCT levels of more than 80 % defines a negative change in PCT result representing a lower risk for 28-day all-cause mortality of patients diagnosed with severe sepsis or septic shock.                Results may be falsely decreased if patient taking Biotin.           Imaging:    I personally reviewed the images from the following radiographic studies.    CT scan of the head done early this morning reveals stable appearance of the right frontal intraparenchymal hematoma.  There remains edema around the hematoma which may have slightly increased the ventricular effacement.    Current Medications:   Scheduled Meds:  ampicillin-sulbactam 3 g Intravenous Q6H   chlorhexidine 15 mL Mouth/Throat Q12H   famotidine 20 mg Intravenous Q12H   insulin regular 0-14 Units Subcutaneous Q6H   levETIRAcetam 1,000 mg Intravenous Q6H   potassium chloride 40 mEq Oral Once   sodium chloride 10 mL Intravenous Q12H     Continuous Infusions:  niCARdipine 5-15 mg/hr Last Rate: 15 mg/hr (08/15/20 0914)   propofol 5-50 mcg/kg/min Last Rate: Stopped (08/15/20 0755)   sodium chloride 100 mL/hr        Assessment/Plan       Intraparenchymal hemorrhage of brain (CMS/HCC)    Supratherapeutic INR      Plan: Patient's hematoma has remained stable off of Coumadin for 2 days now.  She still maintains the ability to follow commands and I think it is appropriate to attempt a ventilator wean at this time.  Hopefully she will tolerate a ventilator wean and be able to maintain her airway.  She was intubated electively due to the size of the hematoma and the supratherapeutic INR in the emergency room.  I will repeat a CT scan of the head in the morning for continued radiographic follow-up      Lon Boucher MD  08/15/20  10:27

## 2020-08-15 NOTE — PROGRESS NOTES
Patient Identification:  NAME:  Afia Bernstein  Age:  64 y.o.   Sex:  female   :  1955   MRN:  [id number]       Chief complaint: Intraparenchymal hemorrhage, cerebral edema seizure    History of present illness: A little bit more lethargic today not following commands quite as well but still moving the right side very well CT today shows increasing edema and slight shift which is increased although the size of the bleed looks the same    Review of systems no fever chills rash no constipation no diarrhea no seizure activity no new focal weakness no complaint of pain or appearance of pain  Past medical history:  Past Medical History:   Diagnosis Date   • CKD (chronic kidney disease) stage 3, GFR 30-59 ml/min (CMS/[facility])    • Deep vein thrombosis (DVT) of proximal vein of both lower extremities (CMS/[facility])    • Disease of thyroid gland    • Fibromyalgia    • Hypertension    • Morbid obesity (CMS/[facility])    • Pulmonary embolism, bilateral (CMS/[facility])        Allergies:  Phenergan [promethazine hcl]    Home medications:  Medications Prior to Admission   Medication Sig Dispense Refill Last Dose   • amLODIPine (NORVASC) 10 MG tablet Take 1 tablet by mouth Daily. 30 tablet 6    • enoxaparin (LOVENOX) 120 MG/0.8ML solution syringe Inject 0.73 mL under the skin into the appropriate area as directed Every 12 (Twelve) Hours. Indications: DVT/PE (active thrombosis) 16 mL 0    • losartan-hydrochlorothiazide (HYZAAR) 100-25 MG per tablet Take 1 tablet by mouth Daily.   Taking   • traMADol-acetaminophen (ULTRACET) 37.5-325 MG per tablet Take 1 tablet by mouth Every 4 (Four) Hours As Needed for Moderate Pain .   Taking   • warfarin (COUMADIN) 5 MG tablet Take 1 tablet by mouth Every Night. Indications: DVT/PE (active thrombosis) 30 tablet 0         Hospital medications:    ampicillin-sulbactam 3 g Intravenous Q6H   chlorhexidine 15 mL Mouth/Throat Q12H   famotidine 20 mg Intravenous Q12H   insulin regular 0-14 Units Subcutaneous Q6H    levETIRAcetam 1,000 mg Intravenous Q6H   sodium chloride 10 mL Intravenous Q12H   sodium chloride 10 mL Intravenous Q12H   sodium chloride 10 mL Intravenous Q12H   sodium chloride 10 mL Intravenous Q12H       niCARdipine 5-15 mg/hr Last Rate: 15 mg/hr (08/15/20 1314)   propofol 5-50 mcg/kg/min Last Rate: 15 mcg/kg/min (08/15/20 0815)   sodium chloride 100 mL/hr Last Rate: 100 mL/hr (08/15/20 1301)     •  acetaminophen  •  dextrose  •  dextrose  •  fentaNYL citrate (PF)  •  glucagon (human recombinant)  •  labetalol  •  labetalol  •  LORazepam  •  LORazepam  •  magnesium sulfate **OR** magnesium sulfate **OR** magnesium sulfate  •  ondansetron **OR** ondansetron  •  potassium chloride **OR** potassium chloride **OR** potassium chloride  •  potassium phosphate infusion greater than 15 mMoles **OR** potassium phosphate infusion greater than 15 mMoles **OR** potassium phosphate **OR** sodium phosphate IVPB **OR** sodium phosphate IVPB  •  sodium chloride  •  sodium chloride  •  sodium chloride  •  sodium chloride      Objective:  Vitals Ranges:   Temp:  [98.5 °F (36.9 °C)-99.9 °F (37.7 °C)] 98.7 °F (37.1 °C)  Heart Rate:  [] 102  Resp:  [21-27] 27  BP: (122-163)/(66-93) 142/78  FiO2 (%):  [40 %-100 %] 46 %      Physical Exam:  Awake lethargic not following my commands consistently.  Pupils 2 constricting to 1-1/2 eyes are conjugate face decreased left nasolabial fold still dense left hemiparesis but moves the right side spontaneously reflexes trace throughout left toe mute right toe downgoing    Results review:   I reviewed the patient's new clinical results.    Data review:  Lab Results (last 24 hours)     Procedure Component Value Units Date/Time    Respiratory Culture - Sputum, Bronchus [[id number]] Collected:  08/15/20 1229    Specimen:  Sputum from Bronchus Updated:  08/15/20 3647     Gram Stain Rare (1+) Epithelial cells per low power field      Many (4+) WBCs seen      Moderate (3+) Gram positive cocci     "POC Glucose Once [[id number]]  (Normal) Collected:  08/15/20 1210    Specimen:  Blood Updated:  08/15/20 1212     Glucose 117 mg/dL     Procalcitonin [[id number]]  (Abnormal) Collected:  08/15/20 0540    Specimen:  Blood from Hand, Left Updated:  08/15/20 0907     Procalcitonin 0.28 ng/mL     Narrative:       As a Marker for Sepsis (Non-Neonates):   1. <0.5 ng/mL represents a low risk of severe sepsis and/or septic shock.  1. >2 ng/mL represents a high risk of severe sepsis and/or septic shock.    As a Marker for Lower Respiratory Tract Infections that require antibiotic therapy:  PCT on Admission     Antibiotic Therapy             6-12 Hrs later  > 0.5                Strongly Recommended            >0.25 - <0.5         Recommended  0.1 - 0.25           Discouraged                   Remeasure/reassess PCT  <0.1                 Strongly Discouraged          Remeasure/reassess PCT      As 28 day mortality risk marker: \"Change in Procalcitonin Result\" (> 80 % or <=80 %) if Day 0 (or Day 1) and Day 4 values are available. Refer to http://www.tabulates-pct-calculator.com/   Change in PCT <=80 %   A decrease of PCT levels below or equal to 80 % defines a positive change in PCT test result representing a higher risk for 28-day all-cause mortality of patients diagnosed with severe sepsis or septic shock.  Change in PCT > 80 %   A decrease of PCT levels of more than 80 % defines a negative change in PCT result representing a lower risk for 28-day all-cause mortality of patients diagnosed with severe sepsis or septic shock.                Results may be falsely decreased if patient taking Biotin.     Basic Metabolic Panel [[id number]]  (Abnormal) Collected:  08/15/20 0540    Specimen:  Blood from Hand, Left Updated:  08/15/20 0644     Glucose 132 mg/dL      BUN 13 mg/dL      Creatinine 1.15 mg/dL      Sodium 139 mmol/L      Potassium 3.2 mmol/L      Chloride 106 mmol/L      CO2 22.0 mmol/L      Calcium 8.8 mg/dL      eGFR Non "  Amer 47 mL/min/1.73      BUN/Creatinine Ratio 11.3     Anion Gap 11.0 mmol/L     Narrative:       GFR Normal >60  Chronic Kidney Disease <60  Kidney Failure <15      BNP [[id number]]  (Normal) Collected:  08/15/20 0540    Specimen:  Blood from Hand, Left Updated:  08/15/20 0644     proBNP 356.7 pg/mL     Narrative:       Among patients with dyspnea, NT-proBNP is highly sensitive for the detection of acute congestive heart failure. In addition NT-proBNP of <300 pg/ml effectively rules out acute congestive heart failure with 99% negative predictive value.    Results may be falsely decreased if patient taking Biotin.      CBC & Differential [[id number]] Collected:  08/15/20 0540    Specimen:  Blood from Hand, Left Updated:  08/15/20 0638    Narrative:       The following orders were created for panel order CBC & Differential.  Procedure                               Abnormality         Status                     ---------                               -----------         ------                     CBC Auto Differential[[id number]]        Abnormal            Final result                 Please view results for these tests on the individual orders.    CBC Auto Differential [[id number]]  (Abnormal) Collected:  08/15/20 0540    Specimen:  Blood from Hand, Left Updated:  08/15/20 0638     WBC 9.84 10*3/mm3      RBC 3.95 10*6/mm3      Hemoglobin 12.4 g/dL      Hematocrit 37.0 %      MCV 93.7 fL      MCH 31.4 pg      MCHC 33.5 g/dL      RDW 12.9 %      RDW-SD 44.0 fl      MPV 10.1 fL      Platelets 234 10*3/mm3      Neutrophil % 86.4 %      Lymphocyte % 6.0 %      Monocyte % 6.4 %      Eosinophil % 0.0 %      Basophil % 0.2 %      Immature Grans % 1.0 %      Neutrophils, Absolute 8.50 10*3/mm3      Lymphocytes, Absolute 0.59 10*3/mm3      Monocytes, Absolute 0.63 10*3/mm3      Eosinophils, Absolute 0.00 10*3/mm3      Basophils, Absolute 0.02 10*3/mm3      Immature Grans, Absolute 0.10 10*3/mm3      nRBC 0.0 /100 WBC      Protime-INR [[id number]]  (Normal) Collected:  08/15/20 0540    Specimen:  Blood from Hand, Left Updated:  08/15/20 0632     Protime 13.6 Seconds      INR 1.05    POC Glucose Once [[id number]]  (Normal) Collected:  08/15/20 0520    Specimen:  Blood Updated:  08/15/20 0521     Glucose 130 mg/dL     POC Glucose Once [[id number]]  (Normal) Collected:  08/14/20 2307    Specimen:  Blood Updated:  08/14/20 2308     Glucose 123 mg/dL     POC Glucose Once [[id number]]  (Normal) Collected:  08/14/20 1758    Specimen:  Blood Updated:  08/14/20 1759     Glucose 123 mg/dL            Imaging:  Imaging Results (Last 24 Hours)     Procedure Component Value Units Date/Time    XR Abdomen KUB [[id number]] Collected:  08/15/20 1202     Updated:  08/15/20 1213    Narrative:       XR ABDOMEN KUB-     INDICATIONS: NG tube placement     TECHNIQUE: Supine views of the abdomen     COMPARISON: None available     FINDINGS:     The NG tube extends to the gastroduodenal junction.      The bowel gas pattern is nonobstructive.     Follow-up as clinically indicated.             Impression:          As described.     This report was finalized on 8/15/2020 12:10 PM by Dr. Devin Esteves M.D.       XR Chest 1 View [[id number]] Collected:  08/15/20 1105     Updated:  08/15/20 1110    Narrative:       XR CHEST 1 VW-     HISTORY: Female who is 64 years-old,  left lower lobe  infiltrate/atelectasis     TECHNIQUE: Frontal view of the chest     COMPARISON: 08/13/2020     FINDINGS: Stable appearing endotracheal tube. The heart size appears  borderline enlarged. Persistent appearance of left basilar opacity. Some  opacity is also apparent medially at the right base. These could be  areas of atelectasis or pneumonia, continued follow-up suggested. No  pneumothorax. No acute osseous process.       Impression:       Basilar opacities, continued follow-up suggested.     This report was finalized on 8/15/2020 11:07 AM by Dr. Devin Esteves M.D.       CT Head  Without Contrast [[id number]] Collected:  08/15/20 0701     Updated:  08/15/20 0713    Narrative:       CT HEAD WO CONTRAST-     INDICATIONS: Intracranial hemorrhage, follow-up     TECHNIQUE: Radiation dose reduction techniques were utilized, including  automated exposure control and exposure modulation based on body size.  Noncontrast head CT     COMPARISON: 08/14/2020     FINDINGS:     Right frontal intraparenchymal hematoma is redemonstrated, and appears  stable in size but with increased surrounding edema and local mass  effect, increased leftward midline shift (about 8 mm on axial image 31,  previously 5 mm), and increased partial ventricular effacement. The  largest component of of the hematoma again measures about 4.3 x 5.3 cm  on axial image 25. Amount of intraventricular hemorrhage does not appear  significantly changed.     The visualized paranasal sinuses, orbits, mastoid air cells are  unremarkable.                   Impression:          The size of the right frontal intraparenchymal hematoma does not appear  significant changed, but some increased edema around the hematoma is  apparent, with increased mass effect, increased leftward midline shift  of 8 mm, previously 5 mm, increased partial ventricular effacement.     Discussed by telephone with the patient's nurse, Clarisa, at time of  interpretation, 0706, 08/15/20.     This report was finalized on 8/15/2020 7:10 AM by Dr. Devin Esteves M.D.            PPE was warned by me at all times I was not within 6 feet of her for more than 10 minutes no aerosols used I washed out before put it on washed up after I took it off disposed of everything appropriately    Assessment and Plan:       Intraparenchymal hemorrhage of brain (CMS/HCC)    Supratherapeutic INR  A little restless but still following commands on her right side intermittently she is tired today and not as cooperative as yesterday but neurologically she is stable no evidence of seizures  certainly.  The repeat CT by my independent eyeball review shows the area of blood about the same but there is some increased cerebral edema edema and shift I would like to give her a couple doses of IV mannitol and check a serum osmolality tomorrow and I agree with plans to repeat the CT tomorrow as well.      Lopez Nicholson MD  08/15/20  15:14

## 2020-08-15 NOTE — CONSULTS
"Adult Nutrition  Assessment/PES    Patient Name:  Afia Bernstein  YOB: 1955  MRN: [id number]  Admit Date:  8/13/2020    Assessment Date:  8/15/2020  Nutrition assessment triggered by TF consult.  History from patient's chart. Intraparenchymal hemorrhage of brain.  NPO. Intubated. TF order per MD and recommend to continue: Diabetisource @ 50 cc/hr.  RD to continue to follow closely.  Reason for Assessment     Row Name 08/15/20 1039          Reason for Assessment    Reason For Assessment  physician consult;TF/PN     Diagnosis   Intraparenchymal hemorrhage of brain; GABRIELLA, seizures, HTN, morbid obesity           Anthropometrics     Row Name 08/15/20 1040          Anthropometrics    Height  154.9 cm (60.98\")        Admit Weight    Admit Weight  110 kg (242 lb 8.1 oz)        Ideal Body Weight (IBW)    Ideal Body Weight (IBW) (kg)  48.11     % of Ideal Body Weight Assessment  -- 228%        Body Mass Index (BMI)    BMI Assessment  BMI 40 or greater: obesity grade III BMI-45.7         Labs/Tests/Procedures/Meds     Row Name 08/15/20 1040          Labs/Procedures/Meds    Lab Results Reviewed  reviewed, pertinent     Lab Results Comments  K, Creat        Diagnostic Tests/Procedures    Diagnostic Test/Procedure Reviewed  reviewed, pertinent        Medications    Pertinent Medications Reviewed  reviewed, pertinent     Pertinent Medications Comments  insulin, KCl, NaCl         Physical Findings     Row Name 08/15/20 1040          Physical Findings    Overall Physical Appearance  on ventilator support;obese B=13         Estimated/Assessed Needs     Row Name 08/15/20 1041 08/15/20 1040       Calculation Measurements    Weight Used For Calculations  110 kg (242 lb 8.1 oz)      Height    154.9 cm (60.98\")       Estimated/Assessed Needs    Additional Documentation  KCAL/KG (Group);Protein Requirements (Group);Fluid Requirements (Group)         KCAL/KG    KCAL/KG  18 Kcal/Kg (kcal);20 Kcal/Kg (kcal)      18 Kcal/Kg " (kcal)  1980      20 Kcal/Kg (kcal)  2200         Protein Requirements    Weight Used For Protein Calculations  110 kg (242 lb 8.1 oz)      Est Protein Requirement Amount (gms/kg)  0.8 gm protein      Estimated Protein Requirements (gms/day)  88         Fluid Requirements    Estimated Fluid Requirements (mL/day)  2200      RDA Method (mL)  2200          Nutrition Prescription Ordered     Row Name 08/15/20 1041          Nutrition Prescription PO    Current PO Diet  NPO                 Problem/Interventions:  Problem 1     Row Name 08/15/20 1041          Nutrition Diagnoses Problem 1    Problem 1  Needs Alternate Route     Etiology (related to)  Medical Diagnosis     Neurological  -- Intraparenchymal hemorrhage of brain      Signs/Symptoms (evidenced by)  NPO               Intervention Goal     Row Name 08/15/20 1042          Intervention Goal    General  Maintain nutrition;Meet nutritional needs for age/condition;Nutrition support treatment     TF/PN  Inititiate TF/PN;Maintain TF/PN;Tolerate TF at goal     Weight  Appropriate weight loss         Nutrition Intervention     Row Name 08/15/20 1042          Nutrition Intervention    RD/Tech Action  Follow Tx progress;Care plan reviewd;Recommend/ordered     Recommended/Ordered  EN         Nutrition Prescription     Row Name 08/15/20 1043          Nutrition Prescription EN    Enteral Prescription  Enteral begin/change;Enteral to supply     Enteral Route  NG     Product  Diabetisource     TF Delivery Method  Continuous     Continuous TF Goal Rate (mL/hr)  50 mL/hr     Water flush (mL)   25 mL     Water Flush Frequency  Per hour     New EN Prescription Ordered?  Yes        EN to Supply    Kcal/Day  1440 Kcal/Day     Kcal/Kg  13 Kcal/Kg     Protein (gm/day)  72 gm/day     TF Free H2O (mL)  984 mL     Total Free H2O (mL/day)  1584 mL/day         Education/Evaluation     Row Name 08/15/20 104          Education    Education  Will Instruct as appropriate         Monitor/Evaluation    Monitor  Per protocol           Electronically signed by:  Brigid Patricio RD  08/15/20 10:45

## 2020-08-16 NOTE — PROGRESS NOTES
Patient Identification:  NAME:  Afia Bernstein  Age:  64 y.o.   Sex:  female   :  1955   MRN:  [id number]       Chief complaint: Right hemisphere intracerebral hemorrhage cerebral edema    History of present illness: She is needing sedation for her own comfort still moves the right side well to command repeat CT by my independent eyeball review does not show any significant change.  Serum osmolality 301  Review of systems no fever chills rash no nausea vomiting no diarrhea or constipation no seizure activity    Past medical history:  Past Medical History:   Diagnosis Date   • CKD (chronic kidney disease) stage 3, GFR 30-59 ml/min (CMS/[facility])    • Deep vein thrombosis (DVT) of proximal vein of both lower extremities (CMS/[facility])    • Disease of thyroid gland    • Fibromyalgia    • Hypertension    • Morbid obesity (CMS/[facility])    • Pulmonary embolism, bilateral (CMS/[facility])        Allergies:  Phenergan [promethazine hcl]    Home medications:  Medications Prior to Admission   Medication Sig Dispense Refill Last Dose   • amLODIPine (NORVASC) 10 MG tablet Take 1 tablet by mouth Daily. 30 tablet 6    • enoxaparin (LOVENOX) 120 MG/0.8ML solution syringe Inject 0.73 mL under the skin into the appropriate area as directed Every 12 (Twelve) Hours. Indications: DVT/PE (active thrombosis) 16 mL 0    • losartan-hydrochlorothiazide (HYZAAR) 100-25 MG per tablet Take 1 tablet by mouth Daily.   Taking   • traMADol-acetaminophen (ULTRACET) 37.5-325 MG per tablet Take 1 tablet by mouth Every 4 (Four) Hours As Needed for Moderate Pain .   Taking   • warfarin (COUMADIN) 5 MG tablet Take 1 tablet by mouth Every Night. Indications: DVT/PE (active thrombosis) 30 tablet 0         Hospital medications:    albuterol sulfate HFA 6 puff Inhalation 4x Daily - RT   ampicillin-sulbactam 3 g Intravenous Q6H   chlorhexidine 15 mL Mouth/Throat Q12H   famotidine 20 mg Intravenous Q12H   insulin regular 0-14 Units Subcutaneous Q6H   levETIRAcetam 1,000  mg Intravenous Q6H   sodium chloride 10 mL Intravenous Q12H   sodium chloride 10 mL Intravenous Q12H   sodium chloride 10 mL Intravenous Q12H   sodium chloride 10 mL Intravenous Q12H       niCARdipine 5-15 mg/hr Last Rate: 12.5 mg/hr (08/16/20 1238)   propofol 5-50 mcg/kg/min Last Rate: 20 mcg/kg/min (08/16/20 1259)     •  acetaminophen  •  dextrose  •  dextrose  •  fentaNYL citrate (PF)  •  glucagon (human recombinant)  •  labetalol  •  labetalol  •  LORazepam  •  LORazepam  •  magnesium sulfate **OR** magnesium sulfate **OR** magnesium sulfate  •  ondansetron **OR** ondansetron  •  potassium chloride **OR** potassium chloride **OR** potassium chloride  •  potassium phosphate infusion greater than 15 mMoles **OR** potassium phosphate infusion greater than 15 mMoles **OR** potassium phosphate **OR** sodium phosphate IVPB **OR** sodium phosphate IVPB  •  sodium chloride  •  sodium chloride  •  sodium chloride  •  sodium chloride      Objective:  Vitals Ranges:   Temp:  [98.8 °F (37.1 °C)-99.5 °F (37.5 °C)] 99.1 °F (37.3 °C)  Heart Rate:  [] 89  Resp:  [26-34] 33  BP: (112-165)/(68-96) 151/77  FiO2 (%):  [45 %-46 %] 45 %      Physical Exam:  Patient is still needing sedation pupils 2 mm bilaterally eyes conjugate she does move her right foot to command spontaneously grasp with her right hand still not moving the left side reflexes depressed throughout right toe downgoing left toe mute    Results review:   I reviewed the patient's new clinical results.    Data review:  Lab Results (last 24 hours)     Procedure Component Value Units Date/Time    POC Glucose Once [[id number]]  (Abnormal) Collected:  08/16/20 1123    Specimen:  Blood Updated:  08/16/20 1124     Glucose 154 mg/dL     MRSA Screen, PCR (Inpatient) - Swab, Nares [[id number]]  (Normal) Collected:  08/16/20 0854    Specimen:  Swab from Nares Updated:  08/16/20 1106     MRSA PCR No MRSA Detected    Respiratory Culture - Sputum, Bronchus [[id number]]  Collected:  08/15/20 1229    Specimen:  Sputum from Bronchus Updated:  08/16/20 0927     Respiratory Culture Culture in progress     Gram Stain Rare (1+) Epithelial cells per low power field      Many (4+) WBCs seen      Moderate (3+) Gram positive cocci    Osmolality, Serum [[id number]]  (Normal) Collected:  08/16/20 0337    Specimen:  Blood Updated:  08/16/20 0559     Osmolality 301 mOsm/kg     Basic Metabolic Panel [[id number]]  (Abnormal) Collected:  08/16/20 0337    Specimen:  Blood Updated:  08/16/20 0508     Glucose 132 mg/dL      BUN 16 mg/dL      Creatinine 1.01 mg/dL      Sodium 140 mmol/L      Potassium 3.3 mmol/L      Chloride 109 mmol/L      CO2 19.0 mmol/L      Calcium 7.9 mg/dL      eGFR Non African Amer 55 mL/min/1.73      BUN/Creatinine Ratio 15.8     Anion Gap 12.0 mmol/L     Narrative:       GFR Normal >60  Chronic Kidney Disease <60  Kidney Failure <15      Magnesium [[id number]]  (Normal) Collected:  08/16/20 0337    Specimen:  Blood Updated:  08/16/20 0507     Magnesium 1.9 mg/dL     CBC (No Diff) [[id number]]  (Abnormal) Collected:  08/16/20 0337    Specimen:  Blood Updated:  08/16/20 0454     WBC 12.36 10*3/mm3      RBC 3.52 10*6/mm3      Hemoglobin 11.2 g/dL      Hematocrit 32.6 %      MCV 92.6 fL      MCH 31.8 pg      MCHC 34.4 g/dL      RDW 12.8 %      RDW-SD 43.9 fl      MPV 10.1 fL      Platelets 229 10*3/mm3     POC Glucose Once [[id number]]  (Normal) Collected:  08/15/20 2325    Specimen:  Blood Updated:  08/15/20 2326     Glucose 107 mg/dL     POC Glucose Once [[id number]]  (Normal) Collected:  08/15/20 1801    Specimen:  Blood Updated:  08/15/20 1821     Glucose 111 mg/dL            Imaging:  Imaging Results (Last 24 Hours)     Procedure Component Value Units Date/Time    CT Head Without Contrast [[id number]] Collected:  08/16/20 0635     Updated:  08/16/20 0645    Narrative:       CT HEAD WO CONTRAST-     INDICATIONS: Intracranial hemorrhage, follow-up     TECHNIQUE: Radiation dose  reduction techniques were utilized, including  automated exposure control and exposure modulation based on body size.  Noncontrast head CT     COMPARISON: 08/15/2020     FINDINGS:        Right frontal intraparenchymal hematoma is redemonstrated, and does not  appear significantly changed in size, with similar degree of surrounding  edema and local mass effect, leftward midline shift (about 8 mm on axial  image 30), and partial ventricular effacement. The largest component of  of the hematoma is measured at 4.3 x 5.1 cm on axial image 25,  previously measured at 4.3 x 5.3 cm. Amount of intraventricular  hemorrhage does not appear significantly changed.     Minimal mucosal thickening in right maxillary sinus. The visualized  paranasal sinuses, orbits, mastoid air cells are otherwise unremarkable.                Impression:        IMPRESSION:         No significant change.     This report was finalized on 8/16/2020 6:42 AM by Dr. Devin Esteves M.D.            PPE was warned by me the patient is intubated I washed up for putting on disposed of it afterwards washed up afterwards widened within 6 feet of her for more than 10 minutes during my exam no aerosols used    Assessment and Plan:       Intraparenchymal hemorrhage of brain (CMS/HCC)    Supratherapeutic INR    Sedated with appropriate metabolic encephalopathy still moves the right side to command.  Not having any seizures.  I reviewed the CT today with an independent eyeball review and there continues some edema.  Serum osmolality 301.  She got a couple doses of mannitol and I will hold that at this point.  Agree with repeat CT tomorrow  Lopez Nicholson MD  08/16/20  13:29

## 2020-08-16 NOTE — PROGRESS NOTES
NEUROSURGERY PROGRESS NOTE     LOS: 3 days   Patient Care Team:  Jorge Abel MD as PCP - General (Family Medicine)  Moisés Christine MD as Consulting Physician (Gastroenterology)    Chief Complaint: Headache    Subjective     Interval History:     Hospital day 4 following the diagnosis of a large right intracerebral hemorrhage due to supratherapeutic INR.  Patient continues to follow commands with nonsedated but is requiring heavier sedation for mechanical ventilation.  She was just given fentanyl and remains on a propofol drip so I could not get an adequate exam today.    While in the room and during my examination of the patient I wore a mask and eye protection.  I washed my hands before and after this patient encounter.  The patient was also wearing a mask.     History taken from: RN    Objective      Vital Signs  Temp:  [98.7 °F (37.1 °C)-99.5 °F (37.5 °C)] 99.3 °F (37.4 °C)  Heart Rate:  [] 80  Resp:  [26-34] 34  BP: (112-165)/(68-96) 142/77  FiO2 (%):  [45 %-46 %] 45 %  Body mass index is 46.22 kg/m².    Intake/Output last 3 shifts:  I/O last 3 completed shifts:  In: 6870 [I.V.:6456; Other:126; NG/GT:288]  Out: 2150 [Urine:2150]    Intake/Output this shift:  I/O this shift:  In: -   Out: 175 [Urine:175]    Physical    Although nursing reports there is a slight difference between the pupils I measure them as 3 mm and reactive bilaterally  She has roving eye movements but tends toward a left gaze, unchanged  She does withdraw her right arm and leg despite the sedation but will not follow commands due to sedation, with the last neuro check she did follow commands on the left for the RN at bedside  She has weakness of the left arm greater than leg    Results Review:     I reviewed the patient's new clinical results.    Labs:    Lab Results (last 24 hours)     Procedure Component Value Units Date/Time    POC Glucose Once [[id number]]  (Normal) Collected:  08/15/20 1210    Specimen:  Blood Updated:   08/15/20 1212     Glucose 117 mg/dL     Respiratory Culture - Sputum, Bronchus [[id number]] Collected:  08/15/20 1229    Specimen:  Sputum from Bronchus Updated:  08/16/20 0927     Respiratory Culture Culture in progress     Gram Stain Rare (1+) Epithelial cells per low power field      Many (4+) WBCs seen      Moderate (3+) Gram positive cocci    POC Glucose Once [[id number]]  (Normal) Collected:  08/15/20 1801    Specimen:  Blood Updated:  08/15/20 1821     Glucose 111 mg/dL     POC Glucose Once [[id number]]  (Normal) Collected:  08/15/20 2325    Specimen:  Blood Updated:  08/15/20 2326     Glucose 107 mg/dL     Osmolality, Serum [[id number]]  (Normal) Collected:  08/16/20 0337    Specimen:  Blood Updated:  08/16/20 0559     Osmolality 301 mOsm/kg     CBC (No Diff) [[id number]]  (Abnormal) Collected:  08/16/20 0337    Specimen:  Blood Updated:  08/16/20 0454     WBC 12.36 10*3/mm3      RBC 3.52 10*6/mm3      Hemoglobin 11.2 g/dL      Hematocrit 32.6 %      MCV 92.6 fL      MCH 31.8 pg      MCHC 34.4 g/dL      RDW 12.8 %      RDW-SD 43.9 fl      MPV 10.1 fL      Platelets 229 10*3/mm3     Basic Metabolic Panel [[id number]]  (Abnormal) Collected:  08/16/20 0337    Specimen:  Blood Updated:  08/16/20 0508     Glucose 132 mg/dL      BUN 16 mg/dL      Creatinine 1.01 mg/dL      Sodium 140 mmol/L      Potassium 3.3 mmol/L      Chloride 109 mmol/L      CO2 19.0 mmol/L      Calcium 7.9 mg/dL      eGFR Non African Amer 55 mL/min/1.73      BUN/Creatinine Ratio 15.8     Anion Gap 12.0 mmol/L     Narrative:       GFR Normal >60  Chronic Kidney Disease <60  Kidney Failure <15      Magnesium [[id number]]  (Normal) Collected:  08/16/20 0337    Specimen:  Blood Updated:  08/16/20 0507     Magnesium 1.9 mg/dL     MRSA Screen, PCR (Inpatient) - Swab, Nares [[id number]] Collected:  08/16/20 0854    Specimen:  Swab from Nares Updated:  08/16/20 0915          Imaging:    I personally reviewed the images from the following radiographic  studies.    CT scan of the head reveals no change from yesterday's study.  There remains a stable right intraparenchymal hemorrhage with surrounding edema and roughly 8 mm of shift.  The cisterns are open without contact of the brainstem.  There is some slight interventricular hemorrhage at both occipital horns but no hydrocephalus has developed.    Current Medications:   Scheduled Meds:  albuterol sulfate HFA 6 puff Inhalation 4x Daily - RT   ampicillin-sulbactam 3 g Intravenous Q6H   chlorhexidine 15 mL Mouth/Throat Q12H   famotidine 20 mg Intravenous Q12H   insulin regular 0-14 Units Subcutaneous Q6H   levETIRAcetam 1,000 mg Intravenous Q6H   sodium chloride 10 mL Intravenous Q12H   sodium chloride 10 mL Intravenous Q12H   sodium chloride 10 mL Intravenous Q12H   sodium chloride 10 mL Intravenous Q12H     Continuous Infusions:  niCARdipine 5-15 mg/hr Last Rate: 10 mg/hr (08/16/20 0957)   propofol 5-50 mcg/kg/min Last Rate: 20 mcg/kg/min (08/16/20 0749)       Assessment/Plan       Intraparenchymal hemorrhage of brain (CMS/HCC)    Supratherapeutic INR      Plan: Patient's radiographic study is remained stable.  She did develop a little increased edema when we came off of the mannitol on yesterday's scan but that is stabilized now.  At hospital day 4, today, the edema should start resolving spontaneously and the hematoma should start to evolve.  She is requiring significant amount of sedation to keep her calm on the mechanical ventilator.  Hopefully over the next few days we can make an attempt at ventilator weaning otherwise we may need to consider early trach.  I will repeat on more daily CT tomorrow morning and then if stable reduce the frequency of scans.    Lon Boucher MD  08/16/20  10:37

## 2020-08-16 NOTE — PROGRESS NOTES
Toño Montejo MD                          [phone]      Patient ID:    Name:  Afia Bernstein    MRN:  [id number]    1955   64 y.o.  female            Patient Care Team:  Jorge Abel MD as PCP - General (Family Medicine)  Moisés Christine MD as Consulting Physician (Gastroenterology)    CC/ Reason for visit: Acute left-sided weakness, large right intraparenchymal hemorrhage, acute respiratory failure    Subjective: Pt seen and examined this AM.  Patient remains on mechanical ventilator unfortunately still with left hemiplegia.  Repeat CT head this a.m. is pending.  Copious secretions noted from the ET tube.  No new fevers noted.  Chest x-ray shows persistent infiltrate in the left.  Lower extremity venous duplex confirms acute and chronic DVT    ROS: Unable to obtain due to respiratory failure    Objective     Vital Signs past 24hrs    BP range: BP: (112-165)/(68-96) 146/81  Pulse range: Heart Rate:  [] 81  Resp rate range: Resp:  [26-34] 34  Temp range: Temp (24hrs), Av.1 °F (37.3 °C), Min:98.7 °F (37.1 °C), Max:99.5 °F (37.5 °C)      Ventilator/Non-Invasive Ventilation Settings (From admission, onward)     Start     Ordered    20 1712  Ventilator - AC/VC; (18); 40; 5; 450  Continuous     Question Answer Comment   Vent Mode AC/VC    Breath rate  18   FiO2 40    PEEP 5    Tidal Volume 450        20 1712    20 1544  Ventilator - AC/VC  Continuous,   Status:  Canceled     Question:  Vent Mode  Answer:  AC/VC    20 1543                Device (Oxygen Therapy): ventilator FiO2 (%): 45 %     111 kg (244 lb 7.8 oz); Body mass index is 46.22 kg/m².      Intake/Output Summary (Last 24 hours) at 2020 0806  Last data filed at 2020 0749  Gross per 24 hour   Intake 5899 ml   Output 1475 ml   Net 4424 ml       PHYSICAL EXAM   Constitutional: Middle aged pt in bed, awake and following commands on the right while on a mechanical  ventilator.  Head: - NCAT  Eyes: No pallor.  Anicteric sclerae, EOMI.  ENMT:   Endotracheal tube in place with copious thick no oral thrush. Dry MM.   NECK: Trachea midline, No thyromegaly, no palpable cervical lymphadenopathy  Heart: RRR, no murmur. No pedal edema   Lungs: MARY +, decreased breath sounds in the left base along with some rhonchi, no wheezes/ crackles heard    Abdomen: Soft.  Obese, no tenderness, guarding or rigidity. No palpable masses  Extremities: Extremities warm and well perfused. No cyanosis/ clubbing  Neuro: Conscious, following commands on the right, dense left hemiplegia  Psych: Mood and affect unable to obtain     Scheduled meds:      ampicillin-sulbactam 3 g Intravenous Q6H   chlorhexidine 15 mL Mouth/Throat Q12H   famotidine 20 mg Intravenous Q12H   insulin regular 0-14 Units Subcutaneous Q6H   levETIRAcetam 1,000 mg Intravenous Q6H   sodium chloride 10 mL Intravenous Q12H   sodium chloride 10 mL Intravenous Q12H   sodium chloride 10 mL Intravenous Q12H   sodium chloride 10 mL Intravenous Q12H       IV meds:                          niCARdipine 5-15 mg/hr Last Rate: 10 mg/hr (08/16/20 0729)   propofol 5-50 mcg/kg/min Last Rate: 20 mcg/kg/min (08/16/20 0749)   sodium chloride 100 mL/hr Last Rate: 100 mL/hr (08/16/20 0749)       Data Review:      Results from last 7 days   Lab Units 08/16/20  0337 08/15/20  0540 08/14/20  0815  08/13/20  1846 08/13/20  1525 08/13/20  1141   SODIUM mmol/L 140 139 134*   < >  --   --  136   POTASSIUM mmol/L 3.3* 3.2* 3.9   < >  --   --  3.8   CHLORIDE mmol/L 109* 106 104   < >  --   --  100   CO2 mmol/L 19.0* 22.0 19.8*   < >  --   --  24.8   BUN mg/dL 16 13 13   < >  --   --  19   CREATININE mg/dL 1.01* 1.15* 1.12*   < >  --   --  1.25*   CALCIUM mg/dL 7.9* 8.8 8.2*   < >  --   --  9.4   BILIRUBIN mg/dL  --   --  0.8  --   --   --  0.4   ALK PHOS U/L  --   --  57  --   --   --  66   ALT (SGPT) U/L  --   --  18  --   --   --  18   AST (SGOT) U/L  --   --  20   --   --   --  17   GLUCOSE mg/dL 132* 132* 132*   < >  --   --  169*   WBC 10*3/mm3 12.36* 9.84 9.00  --   --   --  10.95*   HEMOGLOBIN g/dL 11.2* 12.4 11.2*  --   --   --  13.4   PLATELETS 10*3/mm3 229 234 218  --   --   --  260   INR   --  1.05  --   --  0.99 0.97 8.27*   PROBNP pg/mL  --  356.7  --   --   --   --   --    PROCALCITONIN ng/mL  --  0.28*  --   --   --   --   --     < > = values in this interval not displayed.       Lab Results   Component Value Date    CALCIUM 7.9 (L) 08/16/2020    PHOS 3.5 07/24/2020             Results from last 7 days   Lab Units 08/13/20  1417   PH, ARTERIAL pH units 7.455*   PO2 ART mm Hg 108.7*   PCO2, ARTERIAL mm Hg 36.7   HCO3 ART mmol/L 25.8        Results Review:    I have reviewed the relevant laboratory results and independently reviewed the chest imaging from this hospitalization including the available echocardiogram reports personally and summarized it if/ when appropriate below    Assessment    Acute left-sided weakness  Acute right frontal intracerebral hemorrhage  Brain compression with midline shift  Acute encephalopathy from above  Acute respiratory failure s/p mechanical ventilation  New fever  LLL atelectasis - concern for pna   Acute renal failure  Seizures  Coumadin coagulopathy s/p reversal  Hyponatremia-improved  Hypokalemia  Persistent lower extremity DVT/PE off anticoagulation now   Recent submassive PE - 7/20 s/p EKOS   Hypertension  Morbid obesity    PLAN:  Patient remains on mechanical ventilator due to encephalopathy from intracranial bleed with brain compression along with concern for pneumonia now suspected aspiration.  Copious secretions still noted.  Mechanical ventilator settings have been adjusted.  ABG done previously reviewed.  Not a good candidate for extubation given copious respiratory secretions and encephalopathy still.  Repeat CT head this a.m. is pending.  Await neurosurgery and neurology recommendations regarding further plan.  Defer  hypertonic saline versus mannitol to them.  On antiepileptic for seizures.  Patient with current concern for pneumonia suspected aspiration.  MRSA screen will be sent given respiratory culture showing gram-positive cocci.  We will add vancomycin if positive.  Repeat lower extremity venous duplex confirms acute DVT on chronic DVT and superficial venous thrombus.  Would consider vascular surgery evaluation based on prognostic recommendations from neurology/neurosurgery.  Appreciate oncology input and agree with recommendations  Blood pressure control per recommendation  Mechanical DVT prophylaxis/GI prophylaxis    Full code     Guarded prognosis.  Discussed extensively with her  yesterday about overall plan and told him about the current situation being very tricky and difficult to be assessing overall prognosis.  Also discussed with neurology/ neurosurgery regarding his concerns about her neuro prognosis.    CC - 40 mins    I have discussed my findings and recommendations with patient and nursing staff.     Toño Montejo MD  8/16/2020

## 2020-08-16 NOTE — PROGRESS NOTES
Subjective   REASON FOR CONSULTATION: Intracranial hemorrhage in a patient anticoagulated due to recent PE    HISTORY OF PRESENT ILLNESS:   The patient is a 64 y.o. female who is currently admitted with intraparenchymal hemorrhage in the brain associated with Coumadin anticoagulation and supratherapeutic INR.  She had recently been hospitalized in late July due to DVT and pulmonary embolus in spite of Eliquis.  On that admission she was transitioned to Coumadin.  Her INR in the emergency room on 8/13/2020 was 8.27 with a pro time of 64.9 seconds.  She has received vitamin K and a dose of Kcentra. Her INR is now normal.     Reviewing her labs from the prior admission her hypercoagulable work-up was unrevealing.     She currently is intubated and on a ventilator in the ICU    8/15/2020  She has been started on antibiotic therapy due to concern of developing pneumonia.  Neurosurgery feels that she could potentially be weaned from the ventilator.  Her INR remains normal currently.    8/16/2020  Blood counts are stable.  She remains on the ventilator.  She obviously is not a candidate for any further anticoagulation.  Little else for us to add at this time.  History of Present Illness      Past Medical History, Past Surgical History, Social History, Family History have been reviewed and are without significant changes except as mentioned.    Review of Systems   Unobtainable as patient is intubated in the ICU  A comprehensive 14 point review of systems was performed and was negative except as mentioned.    Medications:  The current medication list was reviewed in the EMR    ALLERGIES:    Allergies   Allergen Reactions   • Phenergan [Promethazine Hcl] Anxiety       Objective      Vitals:    08/16/20 0900 08/16/20 1000 08/16/20 1002 08/16/20 1120   BP: 141/76  142/77    Pulse: 75 79 80    Resp:    (!) 33   Temp:    99.1 °F (37.3 °C)   TempSrc:    Oral   SpO2: 92% 93% 93%    Weight:       Height:         No flowsheet data  found.    Physical Exam    64-year-old female intubated and on a ventilator in the ICU.  She is sedated.    RECENT LABS:  Hematology WBC   Date Value Ref Range Status   08/16/2020 12.36 (H) 3.40 - 10.80 10*3/mm3 Final     RBC   Date Value Ref Range Status   08/16/2020 3.52 (L) 3.77 - 5.28 10*6/mm3 Final     Hemoglobin   Date Value Ref Range Status   08/16/2020 11.2 (L) 12.0 - 15.9 g/dL Final     Hematocrit   Date Value Ref Range Status   08/16/2020 32.6 (L) 34.0 - 46.6 % Final     Platelets   Date Value Ref Range Status   08/16/2020 229 140 - 450 10*3/mm3 Final            Lab Results   Component Value Date    INR 1.05 08/15/2020    INR 0.99 08/13/2020    INR 0.97 08/13/2020    PROTIME 13.6 08/15/2020    PROTIME 13.0 08/13/2020    PROTIME 12.8 08/13/2020       CT HEAD WITHOUT CONTRAST  8/14/2020   HISTORY: Intraparenchymal hematoma     COMPARISON: 08/13/2020     TECHNIQUE: Axial CT imaging was obtained through the brain. No IV  contrast was administered.     FINDINGS:  Large right frontal intraparenchymal hematoma is again seen. It is  stable in size at 5.3 x 4.3 cm. There is surrounding vasogenic edema.  There is significant mass effect on the right lateral ventricle, with  near complete effacement of the frontal horn. Midline shift of at least  4 to 5 mm mm is unchanged. There is stable intraventricular hemorrhage  noted as well. No new areas of hemorrhage are seen. There is  periventricular and deep white matter microangiopathic change.     IMPRESSION:  Stable appearance to large right frontal intraparenchymal hematoma, as  well as intraventricular hemorrhage.    Assessment/Plan   1.  Acute intraparenchymal brain bleed on Coumadin with supratherapeutic INR.  Patient's coagulopathy has been corrected with vitamin K and Kcentra therapy.  2.  Recent admission with DVT and bilateral pulmonary emboli in late July.  Obviously, patient is no longer a candidate for anticoagulation.  She had been switched to Coumadin from  Eliquis due to Eliquis failure.  Her hypercoagulable work-up in the hospital was unrevealing.  3.  Intensivists are concerned she may be developing pneumonia.  Respiratory culture from her ET tube was sent to the lab and she has been started on antibiotic therapy.     Recommendations  1.  Obviously this a very difficult situation.  Patient has had a catastrophic bleed and is no longer a candidate for any anticoagulation.  2.  If she survives the acute insult she could be considered for IVC filter placement in the future since she is no longer a candidate for anticoagulation.    Nothing else for us to add at this time.  We will sign off for now.  Please call us again if needed.                  8/16/2020      CC:

## 2020-08-17 NOTE — PROGRESS NOTES
Continued Stay Note  [facility]     Patient Name: Afia Bernstein  MRN: [id number]  Today's Date: 8/17/2020    Admit Date: 8/13/2020    Discharge Plan     Row Name 08/17/20 1342       Plan    Plan  Currently on vent; follow for hospital course     Patient/Family in Agreement with Plan  yes    Plan Comments  CCP reviewed chart notes; patient currently on vent with left hemiplegia. CCP will follow for patient's progress and assist as needed. Jaimie SMITH         Discharge Codes    No documentation.             LUIS Bear

## 2020-08-17 NOTE — PROGRESS NOTES
"DOS: 2020  NAME: Afia Bernstein   : 1955  PCP: Jorge Abel MD  Chief Complaint   Patient presents with   • Neuro Deficit(s)         Subjective:   -Stable overnight, no issue to address by the caring nurse.  -Massive right frontal ICH, with edema and midline shift.  -Evaluated by neurosurgery today not for  intervention at this moment.    Vital signs: /67   Pulse 96   Temp 98.6 °F (37 °C) (Oral)   Resp (!) 32   Ht 154.9 cm (60.98\")   Wt 115 kg (253 lb 1.4 oz)   SpO2 93%   BMI 47.85 kg/m²      Gen: Intubated, sedated and ventilated not in pain.    MS: Patient on sedation, she is intubated.  CN: Grossly intact.  Vision: Be checked due to the sedation and lack of cooperation..  Pupils: Normal in size equal round and reactive.  Motor: Dense left hemiparesis, patient with withdrawal on the right.  Sensory: Grimaces on her 4 extremities to painful stimuli  Coordination: Cannot be checked due to the sedation and intubation.  Gait: Not be checked due to her condition.          Laboratory results:  Lab Results   Component Value Date    GLUCOSE 107 (H) 2020    CALCIUM 6.6 (L) 2020     2020    K 3.6 2020    CO2 16.5 (L) 2020     (H) 2020    BUN 20 2020    CREATININE 0.97 2020    EGFRIFNONA 58 (L) 2020    BCR 20.6 2020    ANIONGAP 6.5 2020     Lab Results   Component Value Date    WBC 9.95 2020    HGB 10.6 (L) 2020    HCT 31.8 (L) 2020    MCV 94.9 2020     2020     No results found for: CHOL  No results found for: HDL  No results found for: LDL  Lab Results   Component Value Date    TRIG 71 2020     @[email or web address]@     Review of labs: No significant abnormality.    Review and interpretation of imaging: No new radiological test.  I checked her CT scan from today compared to the previous 1.  She has a large right ICH bleeding with surrounding edema and midline " shift.      Assessment:  1.  Large ICH with surrounding edema and midline shift.   2.  Currently she is sedated, intubated and ventilated.    Plan:  1.  Maintain systolic blood pressure less than 140 and diastolic less than 90.  2.  Brain swelling and midline shift need to be addressed more aggressively.  3.  Reduce Keppra to 750 twice daily.

## 2020-08-17 NOTE — PROGRESS NOTES
Adult Nutrition  Assessment/PES    Patient Name:  Afia Bernstein  YOB: 1955  MRN: [id number]  Admit Date:  8/13/2020    Assessment Date:  8/17/2020    Comments:  Nutrition follow up. Tolerating TF with Diabetisource AC at goal of 50cc/hr. + BM's. Propofol has increased significantly,  maxed at 50 mcg/min overnight. Currently at 30 (22.5/hr).  Currently we are overfeeding her. Would favor changing her feeds to Peptamen Intense VHP at 45cc/hr to meet protein needs and not over feed in calories. She is quite edematous (3-4+). Would lower free water to 30cc q 4 hrs. Will follow clinical course, nutrition support needs.    Reason for Assessment     Row Name 08/17/20 0900          Reason for Assessment    Reason For Assessment  follow-up protocol;TF/PN         Nutrition/Diet History     Row Name 08/17/20 0900          Nutrition/Diet History    Factors Affecting Nutritional Intake  compromised airway;impaired cognitive status/motor control         Anthropometrics     Row Name 08/17/20 0325          Anthropometrics    Weight  115 kg (253 lb 1.4 oz)         Labs/Tests/Procedures/Meds     Row Name 08/17/20 0900          Labs/Procedures/Meds    Lab Results Reviewed  reviewed, pertinent     Lab Results Comments  Glu, ast, alt, h/h, Ca        Diagnostic Tests/Procedures    Diagnostic Test/Procedure Reviewed  reviewed, pertinent     Diagnostic Test/Procedures Comments  head CT, KUB        Medications    Pertinent Medications Reviewed  reviewed, pertinent     Pertinent Medications Comments  Abx, pepcid, insulin, keppra, propofol         Physical Findings     Row Name 08/17/20 0902          Physical Findings    Overall Physical Appearance  on ventilator support;obese;edematous 3-4+ edema     Gastrointestinal  feeding tube     Tubes  nasogastric tube     Oral/Mouth Cavity  inflammation;poor dentition     Skin  other (see comments) b=10, bruising         Estimated/Assessed Needs     Row Name 08/17/20 0904           Calculation Measurements    Weight Used For Calculations  107 kg (235 lb 14.3 oz)        KCAL/KG    KCAL/KG  14 Kcal/Kg (kcal)     14 Kcal/Kg (kcal)  1498        Protein Requirements    Weight Used For Protein Calculations  47.8 kg (105 lb 6.1 oz)     Est Protein Requirement Amount (gms/kg)  2.0 gm protein     Estimated Protein Requirements (gms/day)  95.6        Fluid Requirements    Estimated Fluid Requirements (mL/day)  1800               Nutrition Prescription Ordered     Row Name 08/17/20 0905          Nutrition Prescription PO    Current PO Diet  NPO        Nutrition Prescription EN    Enteral Route  NG     Product  Diabetisource AC (Glucerna 1.2)     TF Delivery Method  Continuous     Continuous TF Goal Rate (mL/hr)  50 mL/hr     Continuous TF Current Rate (mL/hr)  50 mL/hr     Water flush (mL)   25 mL     Water Flush Frequency  Per hour        Propofol Considerations    Propofol (mL/hr)  22.5 mL/hr     Propofol (Kcal/day)  594 Kcal/day         Evaluation of Received Nutrient/Fluid Intake     Row Name 08/17/20 0909          Calories Evaluation    Enteral Calories (kcal)  1440     Other Calories (kcal)  594     Total Calories (kcal)  2034     % of Kcal Needs  135        Protein Evaluation    Enteral Protein (gm)  72     % of Protein Needs  76        Intake Assessment    Energy/Calorie Requirement Assessment  exceeds needs     Protein Requirement Assessment  not meeting needs     Fluid Requirement Assessment  meeting needs     Tolerance  tolerating        Fluid Intake Evaluation    Enteral (Free Water) Fluid (mL)  972     Free Water Flush Fluid (mL)  600     Total Free Water Intake (mL)  1572 mL        EN Evaluation    TF Residual  0     TF Tolerance  Other (comment) BM x 3 8/16, 8/17     HOB  Greater than or equal to 30 degress         Problem/Interventions:    Intervention Goal     Row Name 08/17/20 0912          Intervention Goal    General  Maintain nutrition;Reduce/improve symptoms;Meet nutritional needs  for age/condition;Nutrition support treatment;Disease management/therapy;Improved nutrition related lab(s)     TF/PN  Tolerate TF at goal;Adjust TF/PN     Transition  TF to PO     Weight  Appropriate weight loss         Nutrition Intervention     Row Name 08/17/20 0915          Nutrition Intervention    RD/Tech Action  Follow Tx progress;Care plan reviewd;Recommend/ordered     Recommended/Ordered  EN         Nutrition Prescription     Row Name 08/17/20 0915          Nutrition Prescription EN    Enteral Prescription  Enteral begin/change;Enteral to supply     Enteral Route  NG     Product  Peptamen Intense VHP     TF Delivery Method  Continuous     Continuous TF Goal Rate (mL/hr)  54 mL/hr     Water flush (mL)   30 mL     Water Flush Frequency  Every 4 hours     New EN Prescription Ordered?  Yes        EN to Supply    Kcal/Day  1080 Kcal/Day     Kcal/day with Propofol   1674 Kcal/day with Propofol     Protein (gm/day)  99.36 gm/day     Meet Estimated Kcal Need (%)  105 %     Meet Estimated Protein Need (%)  104 %     TF Free H2O (mL)  907.2 mL     Total Free H2O (mL/day)  1087 mL/day         Education/Evaluation     Row Name 08/17/20 0916          Education    Education  Will Instruct as appropriate;Education not appropriate at this time     Please explain  Patient sedation status;Patient intubated        Monitor/Evaluation    Monitor  Weight;Per protocol;I&O;Skin status;Symptoms;Pertinent labs;TF delivery/tolerance           Electronically signed by:  Cinthya Polanco RD  08/17/20 09:18

## 2020-08-17 NOTE — PROGRESS NOTES
Clinical Pharmacy Services: Medication History    Afia Bernstein is a 64 y.o. female presenting to [facility] for   Chief Complaint   Patient presents with   • Neuro Deficit(s)       She  has a past medical history of CKD (chronic kidney disease) stage 3, GFR 30-59 ml/min (CMS/[facility]), Deep vein thrombosis (DVT) of proximal vein of both lower extremities (CMS/HCC), Disease of thyroid gland, Fibromyalgia, Hypertension, Morbid obesity (CMS/[facility]), and Pulmonary embolism, bilateral (CMS/[facility]).    Allergies as of 08/13/2020 - Reviewed 08/13/2020   Allergen Reaction Noted   • Phenergan [promethazine hcl] Anxiety 07/08/2018       Medication information was obtained from: [facility]  Pharmacy and Phone Number: [email or web address] ([phone])    Prior to Admission Medications     Prescriptions Last Dose Informant Patient Reported? Taking?    amitriptyline (ELAVIL) 50 MG tablet  Pharmacy Yes Yes    Take 50 mg by mouth Every Night.    amLODIPine (NORVASC) 10 MG tablet  Pharmacy No Yes    Take 1 tablet by mouth Daily.    Patient taking differently:  Take 5 mg by mouth Daily.    losartan-hydrochlorothiazide (HYZAAR) 100-25 MG per tablet  Pharmacy Yes Yes    Take 1 tablet by mouth Daily.    pregabalin (LYRICA) 75 MG capsule  Pharmacy Yes Yes    Take 75 mg by mouth 2 (Two) Times a Day. Last filled 6/23, however per [facility]UNTER was filling regularly every month since at least March    traMADol-acetaminophen (ULTRACET) 37.5-325 MG per tablet  Pharmacy Yes Yes    Take 1 tablet by mouth Every 4 (Four) Hours As Needed for Moderate Pain .    warfarin (COUMADIN) 5 MG tablet  Pharmacy No Yes    Take 1 tablet by mouth Every Night. Indications: DVT/PE (active thrombosis)    Patient taking differently:  Take 3 mg by mouth Every Night. Indications: DVT/PE (active thrombosis)    enoxaparin (LOVENOX) 120 MG/0.8ML solution syringe   No No    Inject 0.73 mL under the skin into the appropriate area as directed Every 12 (Twelve) Hours. Indications:  DVT/PE (active thrombosis)            Medication notes: Pharmacy Student completed medication rec.   I did not interview the patient but have reviewed medication list. Agree with medication history and documentation as performed by Renay Peralta, Pharmacy Student.    Patient had script for warfarin 5mg daily filled with [facility] pharmacy on 7/24 with enoxaparin for bridge last admission. However, [facility] has new script that patient had filled for 3 mg daily on 8/12.     This medication list is complete to the best of my knowledge as of 8/17/2020    Please call if questions.    Michelle Monroy, PharmD  8/17/2020 13:24

## 2020-08-17 NOTE — PLAN OF CARE
Patient on vent has to remain sedated max on propofol and fent 75mcg given to keep calm and keep resp less than 40, patient does not follow simple commands, ct repeat this am but no results at this time, cardene drip off at 0545 VVS cont tube feeds

## 2020-08-17 NOTE — PROGRESS NOTES
LOS: 4 days   Patient Care Team:  Jorge Abel MD as PCP - General (Family Medicine)  Moisés Christine MD as Consulting Physician (Gastroenterology)    Chief Complaint:  ICH/Headache    Subjective     Sedated on vent    Interval History:   Failed weaning trial  History taken from: chart RN    Objective      Intubated  Opens eyes to persistent physical stimulation  Withdraws to painful stimuli left lower extremity greater than right  Moves right upper and lower extremity spontaneously, attempts to localize with right upper extremity  Pupils equal and reactive  Some bobbing eye movements with left downward gaze preference  Minimal blink to threat  Positive gag reflex    Vital Signs  Temp:  [99 °F (37.2 °C)-99.6 °F (37.6 °C)] 99.1 °F (37.3 °C)  Heart Rate:  [] 77  Resp:  [23-42] 23  BP: (113-169)/(61-90) 130/65  FiO2 (%):  [45 %] 45 %       Results Review:     I reviewed the patient's new clinical results.  I reviewed the patient's new imaging results and agree with the interpretation.    .  Results from last 7 days   Lab Units 08/17/20  0427 08/16/20  0337 08/15/20  0540   WBC 10*3/mm3 9.95 12.36* 9.84   HEMOGLOBIN g/dL 10.6* 11.2* 12.4   HEMATOCRIT % 31.8* 32.6* 37.0   PLATELETS 10*3/mm3 239 229 234     .  Results from last 7 days   Lab Units 08/17/20  0427 08/16/20  1716 08/16/20  0337 08/15/20  0540 08/14/20  0815  08/13/20  1141   SODIUM mmol/L 143  --  140 139 134*   < > 136   POTASSIUM mmol/L 3.6 4.0 3.3* 3.2* 3.9   < > 3.8   CHLORIDE mmol/L 120*  --  109* 106 104   < > 100   CO2 mmol/L 16.5*  --  19.0* 22.0 19.8*   < > 24.8   BUN mg/dL 20  --  16 13 13   < > 19   CREATININE mg/dL 0.97  --  1.01* 1.15* 1.12*   < > 1.25*   CALCIUM mg/dL 6.6*  --  7.9* 8.8 8.2*   < > 9.4   BILIRUBIN mg/dL 0.5  --   --   --  0.8  --  0.4   ALK PHOS U/L 72  --   --   --  57  --  66   ALT (SGPT) U/L 52*  --   --   --  18  --  18   AST (SGOT) U/L 51*  --   --   --  20  --  17   GLUCOSE mg/dL 107*  --  132* 132*  132*   < > 169*    < > = values in this interval not displayed.     Results from last 7 days   Lab Units 08/15/20  0540  08/13/20  1141   INR  1.05   < > 8.27*   APTT seconds  --   --  74.1*    < > = values in this interval not displayed.     Ct Head Without Contrast    Result Date: 8/17/2020  1. Right frontal intraparenchymal hemorrhage with midline shift to the left. Please see full discussion above. 2. Findings appear similar to yesterday's study.  Radiation dose reduction techniques were utilized, including automated exposure control and exposure modulation based on body size.       Ct Head Without Contrast    Result Date: 8/16/2020   IMPRESSION:   No significant change.  This report was finalized on 8/16/2020 6:42 AM by Dr. Devin Esteves M.D.      Xr Chest 1 View    Result Date: 8/15/2020  Basilar opacities, continued follow-up suggested.  This report was finalized on 8/15/2020 11:07 AM by Dr. Devin Esteves M.D.      Xr Abdomen Kub    Result Date: 8/15/2020   As described.  This report was finalized on 8/15/2020 12:10 PM by Dr. Devin Esteves M.D.        Assessment/Plan       Intraparenchymal hemorrhage of brain (CMS/HCC)    Supratherapeutic INR      CT head this morning reviewed and discussed wt shantell Driscoll. Apparently failed attempt to wean from vent yesterday, still requiring considerable sedation to remain calm. Will repeat CT head in 2-3 days, sooner with any significant neurologic changes.  No plans for neurosurgical intervention at this stage.    Per Dr. Kemp's note 8/14/2020 she is no longer a candidate for anticoagulation as she apparently failed to respond to Eliquis and then the supratherapeutic INR with Coumadin.  Vascular surgery evaluation is a reasonable consideration.      ESVIN Ramírez  08/17/20  09:01

## 2020-08-17 NOTE — PROGRESS NOTES
Toño Montejo MD                          [phone]      Patient ID:    Name:  Afia Bernstein    MRN:  [id number]    1955   64 y.o.  female            Patient Care Team:  Jorge Abel MD as PCP - General (Family Medicine)  Moisés Christine MD as Consulting Physician (Gastroenterology)    CC/ Reason for visit: Acute left-sided weakness, large right intraparenchymal hemorrhage, acute respiratory failure    Subjective: Pt seen and examined this AM.  Patient remains on mechanical ventilator unfortunately still with left hemiplegia.  More altered and not following commands anymore.  Continues to have copious secretions from the ET tube with some improvement.  No new fevers noted.    ROS: Unable to obtain due to respiratory failure    Objective     Vital Signs past 24hrs    BP range: BP: (113-169)/(61-90) 130/65  Pulse range: Heart Rate:  [] 77  Resp rate range: Resp:  [23-42] 29  Temp range: Temp (24hrs), Av.2 °F (37.3 °C), Min:99 °F (37.2 °C), Max:99.6 °F (37.6 °C)      Ventilator/Non-Invasive Ventilation Settings (From admission, onward)     Start     Ordered    20 1712  Ventilator - AC/VC; (18); 40; 5; 450  Continuous     Question Answer Comment   Vent Mode AC/VC    Breath rate  18   FiO2 40    PEEP 5    Tidal Volume 450        20 1712    20 1544  Ventilator - AC/VC  Continuous,   Status:  Canceled     Question:  Vent Mode  Answer:  AC/VC    20 1543                Device (Oxygen Therapy): ventilator FiO2 (%): 41 %     115 kg (253 lb 1.4 oz); Body mass index is 47.85 kg/m².      Intake/Output Summary (Last 24 hours) at 2020 1059  Last data filed at 2020 0500  Gross per 24 hour   Intake 5415 ml   Output 1000 ml   Net 4415 ml       PHYSICAL EXAM   Constitutional: Middle aged pt in bed, sedated on a mechanical ventilator.  Head: - NCAT  Eyes: No pallor.  Anicteric sclerae, closed eyes  ENMT:   Endotracheal tube in place ,  no oral thrush. Moist MM.   NECK: Trachea midline, No thyromegaly, no palpable cervical lymphadenopathy  Heart: RRR, no murmur. 2+ pedal edema   Lungs: MARY +, decreased breath sounds in the left base along with some rhonchi, no wheezes/ crackles heard    Abdomen: Soft.  Obese, no tenderness, guarding or rigidity. No palpable masses  Extremities: Extremities warm and well perfused. No cyanosis/ clubbing  Neuro: Conscious, spontaneously moves right, dense left hemiplegia  Psych: Mood and affect unable to obtain     Scheduled meds:      albuterol sulfate HFA 6 puff Inhalation 4x Daily - RT   ampicillin-sulbactam 3 g Intravenous Q6H   chlorhexidine 15 mL Mouth/Throat Q12H   famotidine 20 mg Intravenous Q12H   insulin regular 0-14 Units Subcutaneous Q6H   levETIRAcetam 1,000 mg Intravenous Q6H   sodium chloride 10 mL Intravenous Q12H   sodium chloride 10 mL Intravenous Q12H   sodium chloride 10 mL Intravenous Q12H   sodium chloride 10 mL Intravenous Q12H       IV meds:                          niCARdipine 5-15 mg/hr Last Rate: Stopped (08/17/20 0545)   propofol 5-50 mcg/kg/min Last Rate: 50 mcg/kg/min (08/17/20 0631)       Data Review:      Results from last 7 days   Lab Units 08/17/20  0427 08/16/20  1716 08/16/20  0337 08/15/20  0540 08/14/20  0815  08/13/20  1846 08/13/20  1525 08/13/20  1141   SODIUM mmol/L 143  --  140 139 134*   < >  --   --  136   POTASSIUM mmol/L 3.6 4.0 3.3* 3.2* 3.9   < >  --   --  3.8   CHLORIDE mmol/L 120*  --  109* 106 104   < >  --   --  100   CO2 mmol/L 16.5*  --  19.0* 22.0 19.8*   < >  --   --  24.8   BUN mg/dL 20  --  16 13 13   < >  --   --  19   CREATININE mg/dL 0.97  --  1.01* 1.15* 1.12*   < >  --   --  1.25*   CALCIUM mg/dL 6.6*  --  7.9* 8.8 8.2*   < >  --   --  9.4   BILIRUBIN mg/dL 0.5  --   --   --  0.8  --   --   --  0.4   ALK PHOS U/L 72  --   --   --  57  --   --   --  66   ALT (SGPT) U/L 52*  --   --   --  18  --   --   --  18   AST (SGOT) U/L 51*  --   --   --  20  --   --    --  17   GLUCOSE mg/dL 107*  --  132* 132* 132*   < >  --   --  169*   WBC 10*3/mm3 9.95  --  12.36* 9.84 9.00  --   --   --  10.95*   HEMOGLOBIN g/dL 10.6*  --  11.2* 12.4 11.2*  --   --   --  13.4   PLATELETS 10*3/mm3 239  --  229 234 218  --   --   --  260   INR   --   --   --  1.05  --   --  0.99 0.97 8.27*   PROBNP pg/mL  --   --   --  356.7  --   --   --   --   --    PROCALCITONIN ng/mL  --   --   --  0.28*  --   --   --   --   --     < > = values in this interval not displayed.       Lab Results   Component Value Date    CALCIUM 6.6 (L) 08/17/2020    PHOS 1.4 (C) 08/17/2020       Results from last 7 days   Lab Units 08/15/20  1229   RESPCX  Heavy growth (4+) Staphylococcus aureus*  Heavy growth (4+) Streptococcus agalactiae (Group B)*  No Normal Respiratory Yuli*       Results from last 7 days   Lab Units 08/13/20  1417   PH, ARTERIAL pH units 7.455*   PO2 ART mm Hg 108.7*   PCO2, ARTERIAL mm Hg 36.7   HCO3 ART mmol/L 25.8        Results Review:    I have reviewed the relevant laboratory results and independently reviewed the chest imaging from this hospitalization including the available echocardiogram reports personally and summarized it if/ when appropriate below    Assessment    Acute left-sided weakness  Acute right frontal intracerebral hemorrhage  Brain compression with midline shift  Acute encephalopathy from above  Acute respiratory failure s/p mechanical ventilation  LLL MSSA and strep pna   Acute renal failure; resolved  Seizures  Coumadin coagulopathy s/p reversal  Persistent RLE DVT off anticoagulation now   Recent submassive PE - 7/20 s/p EKOS   Hypertension  Morbid obesity    PLAN:  Patient remains on mechanical ventilator due to encephalopathy from intracranial bleed with brain compression along with concern for pneumonia.    Mechanical ventilator settings have been adjusted.  ABG done previously reviewed. Not a candidate for extubation given copious respiratory secretions and  encephalopathy still. ? Keppra   Repeat CT head this a.m. reviewed. Noted neurosurgery and neurology recommendations.  Defer hypertonic saline versus mannitol to them.  On antiepileptic for seizures.  C/w abx for pna.   Repeat lower extremity venous duplex confirms acute DVT on chronic DVT and superficial venous thrombus. Would consider vascular surgery evaluation based on prognostic recommendations from neurology/neurosurgery.  Appreciate oncology input and agree with recommendations  Blood pressure control     Mechanical DVT prophylaxis/GI prophylaxis    Full code     Very Guarded prognosis. Pt likely will have a significantly limited baseline given hemiplegia and would benefit from early trach/PEG if  would like to c/w full care. Will d/w him. Await prognostication from neuro/ FRAN as well.      CC - 40 mins    I have discussed my findings and recommendations with patient and nursing staff.     Toño Montejo MD  8/17/2020

## 2020-08-18 NOTE — PROGRESS NOTES
[facility] NEUROSURGERY PROGRESS NOTE    PATIENT IDENTIFICATION:   Name:  Afia Bernstein      MRN:  [id number]     64 y.o.  female               CC: right frontal ICH      Subjective     Interval History: Not following commands per nursing.  Has pneumonia-on Keflex per NG tube.  Continues on Cardene and propofol attempting to wean vent, but patient gets tachypneic    ROS:  Constitutional: No fever, chills  Neuro: Left side weakness    Objective     Vital signs in last 24 hours:  Temp:  [98.6 °F (37 °C)-98.9 °F (37.2 °C)] 98.9 °F (37.2 °C)  Heart Rate:  [] 75  Resp:  [27-37] 27  BP: (124-168)/(64-94) 134/69  FiO2 (%):  [40 %-99 %] 41 %      Intake/Output this shift:  No intake/output data recorded.    Intake/Output last 3 shifts:  I/O last 3 completed shifts:  In: 6899.7 [I.V.:4444.7; Other:502; NG/GT:1576; IV Piggyback:377]  Out: 6400 [Urine:6400]    LABS:  Results from last 7 days   Lab Units 08/18/20  0413 08/17/20  0427 08/16/20  0337   WBC 10*3/mm3 10.02 9.95 12.36*   HEMOGLOBIN g/dL 11.1* 10.6* 11.2*   HEMATOCRIT % 33.3* 31.8* 32.6*   PLATELETS 10*3/mm3 276 239 229     Results from last 7 days   Lab Units 08/18/20  0413 08/17/20  0427 08/16/20  1716 08/16/20  0337  08/14/20  0815   SODIUM mmol/L 142 143  --  140   < > 134*   POTASSIUM mmol/L 4.6 3.6 4.0 3.3*   < > 3.9   CHLORIDE mmol/L 111* 120*  --  109*   < > 104   CO2 mmol/L 19.8* 16.5*  --  19.0*   < > 19.8*   BUN mg/dL 34* 20  --  16   < > 13   CREATININE mg/dL 1.48* 0.97  --  1.01*   < > 1.12*   CALCIUM mg/dL 8.0* 6.6*  --  7.9*   < > 8.2*   BILIRUBIN mg/dL 0.5 0.5  --   --   --  0.8   ALK PHOS U/L 94 72  --   --   --  57   ALT (SGPT) U/L 70* 52*  --   --   --  18   AST (SGOT) U/L 56* 51*  --   --   --  20   GLUCOSE mg/dL 145* 107*  --  132*   < > 132*    < > = values in this interval not displayed.        Results from last 7 days   Lab Units 08/15/20  0540 08/13/20  1846 08/13/20  1525   INR  1.05 0.99 0.97     Resp cx- S.aureus and strept  agalactinae    IMAGING STUDIES:  No new [facility] imaging    I personally viewed and interpreted the patient's Chart.    Meds reviewed/changed: Yes    Current Facility-Administered Medications:   •  acetaminophen (TYLENOL) suppository 650 mg, 650 mg, Rectal, Q4H PRN, Elier Mckoy MD, 650 mg at 08/14/20 0837  •  albuterol sulfate HFA (PROVENTIL HFA;VENTOLIN HFA;PROAIR HFA) inhaler 6 puff, 6 puff, Inhalation, 4x Daily - RT, Toño Montejo MD, 6 puff at 08/18/20 0637  •  ampicillin-sulbactam 3 g/100 mL 0.9% NS (MBP), 3 g, Intravenous, Q6H, Toño Montejo MD, 3 g at 08/18/20 0839  •  chlorhexidine (PERIDEX) 0.12 % solution 15 mL, 15 mL, Mouth/Throat, Q12H, Elier Mckoy MD, 15 mL at 08/18/20 0839  •  dextrose (D50W) 25 g/ 50mL Intravenous Solution 25 g, 25 g, Intravenous, Q15 Min PRN, Elier Mckoy MD  •  dextrose (GLUTOSE) oral gel 15 g, 15 g, Oral, Q15 Min PRN, Elier Mckoy MD  •  famotidine (PEPCID) injection 20 mg, 20 mg, Intravenous, Q12H, Elier Mckoy MD, 20 mg at 08/18/20 0840  •  fentaNYL citrate (PF) (SUBLIMAZE) injection 75 mcg, 75 mcg, Intravenous, Q2H PRN, Elier Mckoy MD, 75 mcg at 08/18/20 0534  •  glucagon (human recombinant) (GLUCAGEN DIAGNOSTIC) injection 1 mg, 1 mg, Subcutaneous, Q15 Min PRN, Elier cMkoy MD  •  insulin regular (humuLIN R,novoLIN R) injection 0-14 Units, 0-14 Units, Subcutaneous, Q6H, Elier Mckoy MD, 3 Units at 08/16/20 1211  •  labetalol (NORMODYNE,TRANDATE) injection 20 mg, 20 mg, Intravenous, Q6H PRN, Elier Mckoy MD, 20 mg at 08/18/20 0328  •  levETIRAcetam (KEPPRA) 750 mg in sodium chloride 0.9 % 100 mL IVPB, 750 mg, Intravenous, BID, Iraj Mackay MD, 750 mg at 08/18/20 0839  •  LORazepam (ATIVAN) injection 1 mg, 1 mg, Intravenous, Q5 Min PRN, Elier Mckoy MD  •  LORazepam (ATIVAN) injection 1 mg, 1 mg, Intravenous, Q2H PRN, Lopez Nicholson MD  •  Magnesium Sulfate 2 gram Bolus, followed by 8 gram infusion (total Mg dose 10  grams)- Mg less than or equal to 1mg/dL, 2 g, Intravenous, PRN **OR** Magnesium Sulfate 2 gram / 50mL Infusion (GIVE X 3 BAGS TO EQUAL 6GM TOTAL DOSE) - Mg 1.1 - 1.5 mg/dl, 2 g, Intravenous, PRN **OR** Magnesium Sulfate 4 gram infusion- Mg 1.6-1.9 mg/dL, 4 g, Intravenous, PRN, Elier Mckoy MD, Last Rate: 25 mL/hr at 08/17/20 0931, 4 g at 08/17/20 0931  •  niCARdipine (CARDENE) 50 mg in sodium chloride 0.9 % 250 mL (0.2 mg/mL) IVPB, 5-15 mg/hr, Intravenous, Titrated, Herman Heard MD, Last Rate: 75 mL/hr at 08/18/20 0741, 15 mg/hr at 08/18/20 0741  •  ondansetron (ZOFRAN) tablet 4 mg, 4 mg, Oral, Q6H PRN **OR** ondansetron (ZOFRAN) injection 4 mg, 4 mg, Intravenous, Q6H PRN, Elier Mckoy MD  •  polyethylene glycol (MIRALAX) packet 17 g, 17 g, Nasogastric, Daily PRN, Toño Montejo MD  •  potassium chloride (KLOR-CON) packet 20 mEq, 20 mEq, Nasogastric, TID, Toño Montejo MD, 20 mEq at 08/18/20 0840  •  potassium chloride (MICRO-K) CR capsule 40 mEq, 40 mEq, Oral, PRN **OR** potassium chloride (KLOR-CON) packet 40 mEq, 40 mEq, Oral, PRN, 40 mEq at 08/16/20 1211 **OR** potassium chloride 10 mEq in 100 mL IVPB, 10 mEq, Intravenous, Q1H PRN, Elier Mckoy MD, Last Rate: 100 mL/hr at 08/15/20 0653, 10 mEq at 08/15/20 0653  •  potassium phosphate 45 mmol in sodium chloride 0.9 % 500 mL infusion, 45 mmol, Intravenous, PRN **OR** potassium phosphate 30 mmol in sodium chloride 0.9 % 250 mL infusion, 30 mmol, Intravenous, PRN, Last Rate: 31.3 mL/hr at 08/17/20 1347, 30 mmol at 08/17/20 1347 **OR** potassium phosphate 15 mmol in sodium chloride 0.9 % 100 mL infusion, 15 mmol, Intravenous, PRN **OR** sodium phosphates 40 mmol in sodium chloride 0.9 % 500 mL IVPB, 40 mmol, Intravenous, PRN **OR** sodium phosphates 20 mmol in sodium chloride 0.9 % 250 mL IVPB, 20 mmol, Intravenous, PRN, Elier Mckoy MD  •  propofol (DIPRIVAN) infusion 10 mg/mL 100 mL, 5-50 mcg/kg/min, Intravenous, Titrated,  Herman Heard MD, Last Rate: 32.1 mL/hr at 08/18/20 0630, 50 mcg/kg/min at 08/18/20 0630  •  sennosides-docusate (PERICOLACE) 8.6-50 MG per tablet 2 tablet, 2 tablet, Oral, BID PRN, Toño Montejo MD  •  sodium chloride 0.9 % flush 10 mL, 10 mL, Intravenous, PRN, Herman Heard MD  •  sodium chloride 0.9 % flush 10 mL, 10 mL, Intravenous, Q12H, Elier Mckoy MD, 10 mL at 08/17/20 2104  •  sodium chloride 0.9 % flush 10 mL, 10 mL, Intravenous, PRN, Eleir Mckoy MD  •  sodium chloride 0.9 % flush 10 mL, 10 mL, Intravenous, Q12H, Toño Montejo MD, 10 mL at 08/17/20 2059  •  sodium chloride 0.9 % flush 10 mL, 10 mL, Intravenous, Q12H, Toño Montejo MD, 10 mL at 08/17/20 2059  •  sodium chloride 0.9 % flush 10 mL, 10 mL, Intravenous, Q12H, Toño Montejo MD, 10 mL at 08/18/20 0840  •  sodium chloride 0.9 % flush 10 mL, 10 mL, Intravenous, PRN, Toño Montejo MD  •  sodium chloride 0.9 % flush 20 mL, 20 mL, Intravenous, PRN, Toño Montejo MD      Physical Exam:    General:   Intubated and sedated.  Does respond with sedation off.  Follow commands on right lower extremity for me, localizes right upper extremity, left neglect  HEENT:    Orally intubated, nasogastric feeding tube  Neck:    Supple  CNII:    Absent left visual threat  CN III IV VI: Does not fully cross midline to left, left visual neglect PERRL brisk  CN VII: Left facial weakness      Motor: Right upper extremity localization, right lower extremity spontaneous movement, left side flaccid   Extremities:   Wearing SCD    Assessment/Plan     ASSESSMENT:      Intraparenchymal hemorrhage of brain (CMS/HCC)    Supratherapeutic INR      PLAN: Patient with large right frontal and parenchymal hemorrhage.  Stable on imaging yesterday.  Slowly attempting to wean sedatives to work on vent weaning.  With propofol off, patient follows commands on right lower extremity localizes right upper extremity.  " She does seem to look at me.  We will plan repeat CT head in a.m.  Long discussion with  that it will be a slow process for the hemorrhage to resolve and we will just have to see how she does.  He would like for me to speak to his daughter-in-law who is a nurse.  I will do so later today..  Fortunately, she is no longer candidate for anticoagulation as discussed by the hematologist despite her history of PE and DVTs.  This is a tough position.  Consider vascular consult for IVC filter.  Repeat CT head in a.m.     I discussed the patient's findings and my recommendations with patient, family, nursing staff and Dr. Boucher       LOS: 5 days       Bia Nevarez, APRN  8/18/2020  08:53    \"Dictated utilizing Dragon dictation\".      "

## 2020-08-18 NOTE — PROGRESS NOTES
Toño Montejo MD                          [phone]      Patient ID:    Name:  Afia Bernstein    MRN:  [id number]    1955   64 y.o.  female            Patient Care Team:  Jorge Abel MD as PCP - General (Family Medicine)  Moisés Christine MD as Consulting Physician (Gastroenterology)    CC/ Reason for visit: Acute left-sided weakness, large right intraparenchymal hemorrhage, acute respiratory failure    Subjective: Pt seen and examined this AM.  Patient remains on mechanical ventilator unfortunately still with left hemiplegia. Still altered and not following commands anymore. Diuresed well. No new fevers noted.Had bradycardia and HTN needing cardene drip now    ROS: Unable to obtain due to respiratory failure    Objective     Vital Signs past 24hrs    BP range: BP: (134-168)/(64-94) 134/69  Pulse range: Heart Rate:  [] 75  Resp rate range: Resp:  [27-37] 27  Temp range: Temp (24hrs), Av.8 °F (37.1 °C), Min:98.6 °F (37 °C), Max:98.9 °F (37.2 °C)      Ventilator/Non-Invasive Ventilation Settings (From admission, onward)     Start     Ordered    20 1712  Ventilator - AC/VC; (18); 40; 5; 450  Continuous     Question Answer Comment   Vent Mode AC/VC    Breath rate  18   FiO2 40    PEEP 5    Tidal Volume 450        20 1712    20 1544  Ventilator - AC/VC  Continuous,   Status:  Canceled     Question:  Vent Mode  Answer:  AC/VC    20 1543                Device (Oxygen Therapy): ventilator FiO2 (%): 41 %     112 kg (247 lb 12.8 oz); Body mass index is 46.85 kg/m².      Intake/Output Summary (Last 24 hours) at 2020 0933  Last data filed at 2020 0500  Gross per 24 hour   Intake 4341.74 ml   Output 5875 ml   Net -1533.26 ml       PHYSICAL EXAM   Constitutional: Middle aged pt in bed, sedated on a mechanical ventilator.  Head: - NCAT  Eyes: No pallor.  Anicteric sclerae, closed eyes  ENMT:   Endotracheal tube in place , no oral  thrush. Moist MM.   NECK: Trachea midline, No thyromegaly, no palpable cervical lymphadenopathy  Heart: RRR, no murmur. Trace pedal edema   Lungs: MARY +, decreased breath sounds in the left base along with some rhonchi, no wheezes/ crackles heard    Abdomen: Soft.  Obese, no tenderness, guarding or rigidity. No palpable masses  Extremities: Extremities warm and well perfused. No cyanosis/ clubbing  Neuro: Conscious, spontaneously moves right, dense left hemiplegia  Psych: Mood and affect unable to obtain     Scheduled meds:      albuterol sulfate HFA 6 puff Inhalation 4x Daily - RT   ampicillin-sulbactam 3 g Intravenous Q6H   chlorhexidine 15 mL Mouth/Throat Q12H   famotidine 20 mg Intravenous Q12H   insulin regular 0-14 Units Subcutaneous Q6H   levETIRAcetam 750 mg Intravenous BID   potassium chloride 20 mEq Nasogastric TID   sodium chloride 10 mL Intravenous Q12H   sodium chloride 10 mL Intravenous Q12H   sodium chloride 10 mL Intravenous Q12H   sodium chloride 10 mL Intravenous Q12H       IV meds:                          niCARdipine 5-15 mg/hr Last Rate: 15 mg/hr (08/18/20 0741)   propofol 5-50 mcg/kg/min Last Rate: 50 mcg/kg/min (08/18/20 0630)       Data Review:      Results from last 7 days   Lab Units 08/18/20  0413 08/17/20  0427 08/16/20  1716 08/16/20  0337 08/15/20  0540 08/14/20  0815  08/13/20  1846 08/13/20  1525   SODIUM mmol/L 142 143  --  140 139 134*   < >  --   --    POTASSIUM mmol/L 4.6 3.6 4.0 3.3* 3.2* 3.9   < >  --   --    CHLORIDE mmol/L 111* 120*  --  109* 106 104   < >  --   --    CO2 mmol/L 19.8* 16.5*  --  19.0* 22.0 19.8*   < >  --   --    BUN mg/dL 34* 20  --  16 13 13   < >  --   --    CREATININE mg/dL 1.48* 0.97  --  1.01* 1.15* 1.12*   < >  --   --    CALCIUM mg/dL 8.0* 6.6*  --  7.9* 8.8 8.2*   < >  --   --    BILIRUBIN mg/dL 0.5 0.5  --   --   --  0.8  --   --   --    ALK PHOS U/L 94 72  --   --   --  57  --   --   --    ALT (SGPT) U/L 70* 52*  --   --   --  18  --   --   --       AST (SGOT) U/L 56* 51*  --   --   --  20  --   --   --    GLUCOSE mg/dL 145* 107*  --  132* 132* 132*   < >  --   --    WBC 10*3/mm3 10.02 9.95  --  12.36* 9.84 9.00  --   --   --    HEMOGLOBIN g/dL 11.1* 10.6*  --  11.2* 12.4 11.2*  --   --   --    PLATELETS 10*3/mm3 276 239  --  229 234 218  --   --   --    INR   --   --   --   --  1.05  --   --  0.99 0.97   PROBNP pg/mL  --   --   --   --  356.7  --   --   --   --    PROCALCITONIN ng/mL  --   --   --   --  0.28*  --   --   --   --     < > = values in this interval not displayed.       Lab Results   Component Value Date    CALCIUM 8.0 (L) 08/18/2020    PHOS 3.8 08/18/2020       Results from last 7 days   Lab Units 08/15/20  1229   RESPCX  Heavy growth (4+) Staphylococcus aureus*  Heavy growth (4+) Streptococcus agalactiae (Group B)*  No Normal Respiratory Yuli*       Results from last 7 days   Lab Units 08/13/20  1417   PH, ARTERIAL pH units 7.455*   PO2 ART mm Hg 108.7*   PCO2, ARTERIAL mm Hg 36.7   HCO3 ART mmol/L 25.8        Results Review:    I have reviewed the relevant laboratory results and independently reviewed the chest imaging from this hospitalization including the available echocardiogram reports personally and summarized it if/ when appropriate below    Assessment    Acute left-sided weakness  Acute right frontal intracerebral hemorrhage  Brain compression with midline shift  Acute encephalopathy from above  Acute respiratory failure s/p mechanical ventilation  LLL MSSA and strep pna   Acute renal failure; resolved  Seizures  Coumadin coagulopathy s/p reversal  Persistent RLE DVT off anticoagulation now   Recent submassive PE - 7/20 s/p EKOS   Hypertension  Morbid obesity    PLAN:  Patient remains on mechanical ventilator due to encephalopathy from intracranial bleed with brain compression along with pneumonia.  Mechanical ventilator settings have been adjusted.  ABG done previously reviewed. Not a candidate for extubation given severe  encephalopathy still.   Repeat CT head reviewed. Noted neurosurgery and neurology recommendations. Defer hypertonic saline versus mannitol to them.  On antiepileptic for seizures.  C/w abx for pna.   Would consider vascular surgery evaluation given DVT based on prognostic recommendations from neurology/neurosurgery and GO per .  Appreciate oncology input and agree with recommendations  Blood pressure control - Cardene drip. Will add oral meds now.     Mechanical DVT prophylaxis/GI prophylaxis    Full code     Very Guarded prognosis. Pt likely will have a significantly limited baseline given hemiplegia and would benefit from early trach/PEG if  would like to c/w full care. D/w him and options explained. He wants to hear from neuro/ [facility] as well.      CC - 40 mins    I have discussed my findings and recommendations with patient and nursing staff.     Toño Montejo MD  8/18/2020

## 2020-08-18 NOTE — PROGRESS NOTES
"DOS: 2020  NAME: Afia Bernstein   : 1955  PCP: Jorge Abel MD  Chief Complaint   Patient presents with   • Neuro Deficit(s)         Subjective:   -Stable overnight, no issue to address by the caring nurse.  -Sedation has been stopped by neurosurgery for evaluation, patient was comprehending and following commands, she was moving her right side as per caring nurse report.  -Plan to wean her off her sedation and try to extubate her.    Vital signs: /79   Pulse 90   Temp 98.9 °F (37.2 °C) (Oral)   Resp (!) 31   Ht 154.9 cm (60.98\")   Wt 112 kg (247 lb 12.8 oz)   SpO2 93%   BMI 46.85 kg/m²      Gen: Lying comfortable in bed, sedated and ventilated.    MS: Sedated and ventilated   CN: Grossly intact  Vision: Not be checked  Pupils: Normal on both eyes.  Motor: Moves her right side.  Sensory: Grimaces to painful stimuli all over.  Coordination: Not done.  Sedation  Gait: Patient intubated and ventilated.          Laboratory results:  Lab Results   Component Value Date    GLUCOSE 145 (H) 2020    CALCIUM 8.0 (L) 2020     2020    K 4.6 2020    CO2 19.8 (L) 2020     (H) 2020    BUN 34 (H) 2020    CREATININE 1.48 (H) 2020    EGFRIFNONA 35 (L) 2020    BCR 23.0 2020    ANIONGAP 11.2 2020     Lab Results   Component Value Date    WBC 10.02 2020    HGB 11.1 (L) 2020    HCT 33.3 (L) 2020    MCV 92.5 2020     2020     No results found for: CHOL  No results found for: HDL  No results found for: LDL  Lab Results   Component Value Date    TRIG 71 2020     @hgba1c@     Review of labs: No significant abnormality.    Review and interpretation of imaging: No new radiological test since yesterday.      Assessment:  1.  Large right frontal lobe ICH with secondary edema and midline shift.  Patient had been sedated, intubated and ventilated.  She is a stable.  2.  Reevaluated by neurosurgery " this morning.  3.  Plan to decrease sedation and try to extubate the patient.    Plan:  1.  Continue the current medication and care.  2.  No other issues to address at this point.

## 2020-08-18 NOTE — NURSING NOTE
After changing the patient, she appeared to have a seizure. HR; 140s, RR:40s BP: 192/88. Eyes deviated down and to the left. Ativan given which seemed to alleviate the seizure. Neuro and Dr. RADHAMES caal

## 2020-08-18 NOTE — PLAN OF CARE
Patient does not follow commands, remain on vent and sedation propofol at 50mcg, resp as high as 40 when sedation lowers, cardene at 15mg/hr for SBP less 140, very difficult to remain less than 140 even given labetalol 20mg, cont keppra, twice hr went to 40 for aprox 5 min for no reason than return to 70-90 SR during shift.  plans to visit this am to speak with neurology concerning patient status and plan of care

## 2020-08-19 NOTE — PROGRESS NOTES
Adult Nutrition  Assessment/PES    Patient Name:  Afia Bernstein  YOB: 1955  MRN: [id number]  Admit Date:  8/13/2020    Assessment Date:  8/19/2020    Comments:  Nutrition follow up. TF remain at goal of 45cc/hr. Lat BM 8/17.  Continues to need propofol. Poss PEG and Trach vs Palliative care discussions underway with family.  Labs reviewed. Will cont to follow clinical course, nutrition support needs.    Reason for Assessment     Row Name 08/19/20 1038          Reason for Assessment    Reason For Assessment  follow-up protocol;TF/PN         Nutrition/Diet History     Row Name 08/19/20 1038          Nutrition/Diet History    Factors Affecting Nutritional Intake  compromised airway;impaired cognitive status/motor control poss PEG/Trach vs WD care - MD to discuss today         Anthropometrics     Row Name 08/19/20 0525          Anthropometrics    Weight  115 kg (252 lb 10.4 oz)         Labs/Tests/Procedures/Meds     Row Name 08/19/20 1039          Labs/Procedures/Meds    Lab Results Reviewed  reviewed, pertinent     Lab Results Comments  BUN, Cr, h/h        Diagnostic Tests/Procedures    Diagnostic Test/Procedure Reviewed  reviewed, pertinent     Diagnostic Test/Procedures Comments  CT Head today        Medications    Pertinent Medications Reviewed  reviewed, pertinent     Pertinent Medications Comments  keflex, pepcid, insulin, keppra, cardene, propofol         Physical Findings     Row Name 08/19/20 1040          Physical Findings    Overall Physical Appearance  on ventilator support;obese;edematous 3-4+ edema     Gastrointestinal  feeding tube     Tubes  nasogastric tube     Oral/Mouth Cavity  inflammation;poor dentition     Skin  other (see comments) b=13, bruising           Nutrition Prescription Ordered     Row Name 08/19/20 1041          Nutrition Prescription PO    Current PO Diet  NPO        Nutrition Prescription EN    Enteral Route  NG     Product  Peptamen Intense VHP (Vital HP)     TF  Delivery Method  Continuous     Continuous TF Goal Rate (mL/hr)  45 mL/hr     Continuous TF Current Rate (mL/hr)  45 mL/hr     Water flush (mL)   30 mL     Water Flush Frequency  Every 4 hours        Propofol Considerations    Propofol (mL/hr)  19 mL/hr     Propofol (Kcal/day)  501.6 Kcal/day         Evaluation of Received Nutrient/Fluid Intake     Row Name 08/19/20 1042          Calories Evaluation    Enteral Calories (kcal)  1080     Other Calories (kcal)  501     Total Calories (kcal)  1581     % of Kcal Needs  106        Protein Evaluation    Enteral Protein (gm)  99.36     % of Protein Needs  104        Intake Assessment    Energy/Calorie Requirement Assessment  meeting needs     Protein Requirement Assessment  meeting needs     Fluid Requirement Assessment  meeting needs     Tolerance  tolerating        Fluid Intake Evaluation    Enteral (Free Water) Fluid (mL)  907     Free Water Flush Fluid (mL)  180     Total Free Water Intake (mL)  1087 mL        EN Evaluation    TF Residual  0     TF Tolerance  Other (comment) BM x 2 8/17     HOB  Greater than or equal to 30 degress         Problem/Interventions:    Intervention Goal     Row Name 08/19/20 1044          Intervention Goal    General  Maintain nutrition;Reduce/improve symptoms;Meet nutritional needs for age/condition;Disease management/therapy;Improved nutrition related lab(s)     TF/PN  Maintain TF/PN;Tolerate TF at goal     Weight  Appropriate weight loss         Nutrition Intervention     Row Name 08/19/20 1044          Nutrition Intervention    RD/Tech Action  Follow Tx progress;Care plan reviewd         Nutrition Prescription     Row Name 08/19/20 1044          Nutrition Prescription EN    Enteral Prescription  Continue same protocol         Education/Evaluation     Row Name 08/19/20 1044          Education    Education  Will Instruct as appropriate        Monitor/Evaluation    Monitor  Per protocol;Skin status;Symptoms;I&O;Pertinent labs;TF  delivery/tolerance;Weight           Electronically signed by:  Cinthya Polanco RD  08/19/20 10:45

## 2020-08-19 NOTE — PROGRESS NOTES
Toño Montejo MD                          [phone]      Patient ID:    Name:  Afia Bernstein    MRN:  [id number]    1955   64 y.o.  female            Patient Care Team:  Jorge Abel MD as PCP - General (Family Medicine)  Moisés Christine MD as Consulting Physician (Gastroenterology)    CC/ Reason for visit: Acute left-sided weakness, large right intraparenchymal hemorrhage, acute respiratory failure    Subjective: Pt seen and examined this AM.  Patient remains on mechanical ventilator unfortunately still with left hemiplegia. No new fevers noted. Still on Cardene drip    ROS: Unable to obtain due to respiratory failure    Objective     Vital Signs past 24hrs    BP range: BP: (103-171)/(58-91) 137/86  Pulse range: Heart Rate:  [] 114  Resp rate range: Resp:  [26-40] 26  Temp range: Temp (24hrs), Av.8 °F (37.7 °C), Min:99.2 °F (37.3 °C), Max:100.7 °F (38.2 °C)      Ventilator/Non-Invasive Ventilation Settings (From admission, onward)     Start     Ordered    20 1712  Ventilator - AC/VC; (18); 40; 5; 450  Continuous     Question Answer Comment   Vent Mode AC/VC    Breath rate  18   FiO2 40    PEEP 5    Tidal Volume 450        20 1712    20 1544  Ventilator - AC/VC  Continuous,   Status:  Canceled     Question:  Vent Mode  Answer:  AC/VC    20 1543                Device (Oxygen Therapy): nasal cannula FiO2 (%): 40 %     115 kg (252 lb 10.4 oz); Body mass index is 47.76 kg/m².      Intake/Output Summary (Last 24 hours) at 2020 1655  Last data filed at 2020 0648  Gross per 24 hour   Intake 2532 ml   Output 800 ml   Net 1732 ml       PHYSICAL EXAM   Constitutional: Middle aged pt in bed, sedated on a mechanical ventilator.  Head: - NCAT  Eyes: No pallor.  Anicteric sclerae, closed eyes  ENMT:   Endotracheal tube in place , no oral thrush. Moist MM.   NECK: Trachea midline, No thyromegaly, no palpable cervical  lymphadenopathy  Heart: RRR, no murmur. Trace pedal edema   Lungs: MARY +, decreased breath sounds in the left base along with some rhonchi, no wheezes/ crackles heard    Abdomen: Soft.  Obese, no tenderness, guarding or rigidity. No palpable masses  Extremities: Extremities warm and well perfused. No cyanosis/ clubbing  Neuro: Conscious, spontaneously moves right, dense left hemiplegia  Psych: Mood and affect unable to obtain     Scheduled meds:      albuterol sulfate HFA 6 puff Inhalation 4x Daily - RT   sodium chloride 10 mL Intravenous Q12H   sodium chloride 10 mL Intravenous Q12H   sodium chloride 10 mL Intravenous Q12H   sodium chloride 10 mL Intravenous Q12H       IV meds:                             Data Review:      Results from last 7 days   Lab Units 08/19/20  0814 08/19/20  0813 08/18/20  0413 08/17/20  0427  08/15/20  0540 08/14/20  0815  08/13/20  1846 08/13/20  1525   SODIUM mmol/L 142  --  142 143   < > 139 134*   < >  --   --    POTASSIUM mmol/L 4.2  --  4.6 3.6   < > 3.2* 3.9   < >  --   --    CHLORIDE mmol/L 114*  --  111* 120*   < > 106 104   < >  --   --    CO2 mmol/L 19.5*  --  19.8* 16.5*   < > 22.0 19.8*   < >  --   --    BUN mg/dL 43*  --  34* 20   < > 13 13   < >  --   --    CREATININE mg/dL 1.24*  --  1.48* 0.97   < > 1.15* 1.12*   < >  --   --    CALCIUM mg/dL 8.0*  --  8.0* 6.6*   < > 8.8 8.2*   < >  --   --    BILIRUBIN mg/dL  --   --  0.5 0.5  --   --  0.8  --   --   --    ALK PHOS U/L  --   --  94 72  --   --  57  --   --   --    ALT (SGPT) U/L  --   --  70* 52*  --   --  18  --   --   --    AST (SGOT) U/L  --   --  56* 51*  --   --  20  --   --   --    GLUCOSE mg/dL 111*  --  145* 107*   < > 132* 132*   < >  --   --    WBC 10*3/mm3  --  9.53 10.02 9.95   < > 9.84 9.00  --   --   --    HEMOGLOBIN g/dL  --  10.4* 11.1* 10.6*   < > 12.4 11.2*  --   --   --    PLATELETS 10*3/mm3  --  295 276 239   < > 234 218  --   --   --    INR   --   --   --   --   --  1.05  --   --  0.99 0.97   PROBNP  pg/mL  --   --   --   --   --  356.7  --   --   --   --    PROCALCITONIN ng/mL  --   --   --   --   --  0.28*  --   --   --   --     < > = values in this interval not displayed.       Lab Results   Component Value Date    CALCIUM 8.0 (L) 08/19/2020    PHOS 3.8 08/18/2020       Results from last 7 days   Lab Units 08/15/20  1229   RESPCX  Heavy growth (4+) Staphylococcus aureus*  Heavy growth (4+) Streptococcus agalactiae (Group B)*  No Normal Respiratory Yuli*       Results from last 7 days   Lab Units 08/13/20  1417   PH, ARTERIAL pH units 7.455*   PO2 ART mm Hg 108.7*   PCO2, ARTERIAL mm Hg 36.7   HCO3 ART mmol/L 25.8        Results Review:    I have reviewed the relevant laboratory results and independently reviewed the chest imaging from this hospitalization including the available echocardiogram reports personally and summarized it if/ when appropriate below    Assessment    Acute left-sided weakness  Acute right frontal intracerebral hemorrhage  Brain compression with midline shift  Acute encephalopathy from above  Acute respiratory failure s/p mechanical ventilation  LLL MSSA and strep pna   Acute renal failure; resolved  Seizures  Coumadin coagulopathy s/p reversal  Persistent RLE DVT off anticoagulation now   Recent submassive PE - 7/20 s/p EKOS   Hypertension  Morbid obesity    PLAN:  Patient remains on mechanical ventilator due to encephalopathy from intracranial bleed with brain compression along with pneumonia.  Mechanical ventilator settings have been adjusted. Not a candidate for extubation given severe encephalopathy still.   Repeat CT head reviewed. Noted neurosurgery and neurology recommendations. Defer hypertonic saline versus mannitol to them.  On antiepileptic for seizures.  C/w abx for pna.   Would consider vascular surgery evaluation given DVT based on prognostic recommendations from neurology/neurosurgery and GOC per .  Appreciate oncology input and agree with recommendations  Blood  pressure control - Cardene drip. Will add oral meds now.     Mechanical DVT prophylaxis/GI prophylaxis    Full code     Discussed with neurology, neurosurgery as well as long discussion with the patient has been about ongoing concerns and overall lack of progress with concern for significantly limited quality of life and likely need for tracheostomy/G-tube and ventilator dependence.  He states that he had a recent discussion with his wife and she was clearly talking about her decision to not be kept alive on machines and removed life sustaining support in that scenario.  He discussed this with his rest of the family members yesterday evening as well and they all agree with his assessment and would like to withdraw life support and compassionately extubate.  We will go ahead and consult palliative care to help him in the process.  He is obviously distraught and will need emotional support as well.  We will respect his wishes and withdraw care at this point.  DNR for now.  Updated nurse    CC - 80 mins    I have discussed my findings and recommendations with patient and nursing staff.     Toño Montejo MD  8/19/2020

## 2020-08-19 NOTE — PROGRESS NOTES
NEUROSURGERY PROGRESS NOTE     LOS: 6 days   Patient Care Team:  Jorge Abel MD as PCP - General (Family Medicine)  Moisés Christine MD as Consulting Physician (Gastroenterology)    Chief Complaint: Headache    Subjective     Interval History:     Patient had no changes neurologically overnight still heavily sedated on the ventilator making it difficult to get a neurologic exam.  Having copious secretions on the mechanical ventilator.    While in the room and during my examination of the patient I wore a mask and eye protection.  I washed my hands before and after this patient encounter.  The patient was also wearing a mask.     History taken from: chart RN    Objective      Vital Signs  Temp:  [98.9 °F (37.2 °C)-100.7 °F (38.2 °C)] 99.6 °F (37.6 °C)  Heart Rate:  [] 99  Resp:  [28-40] 34  BP: (133-169)/(64-88) 159/73  FiO2 (%):  [40 %-100 %] 100 %  Body mass index is 47.76 kg/m².    Intake/Output last 3 shifts:  I/O last 3 completed shifts:  In: 4799 [I.V.:2878; Other:421; NG/GT:1500]  Out: 2425 [Urine:2425]    Intake/Output this shift:  No intake/output data recorded.    Physical    Physical Exam:    CONSTITUTIONAL:  appears well developed, well-nourished and in no acute distress.    NECK: the neck is supple and symmetric. The trachea is midline with no masses.    PULMONARY: Respiratory effort is normal with no increased work of breathing or signs of respiratory distress.    CARDIOVASCULAR: Some dependent edema in the lower extremities    MUSCULOSKELETAL: No joint deformities    SKIN: The skin is warm, dry and intact.    NEUROLOGIC:   Sedated on ventilator occludes exam   pupils equally round reactive light at 4 mm   Weak response on the left localizes on the right over sedation  Will wince to pain all 4 extremities    Cortical function is intact and without deficits. Speech is normal.    PSYCHIATRIC: oriented to person, place and time. Patient's mood and affect are normal.      Results Review:       I reviewed the patient's new clinical results.    Labs:    Lab Results (last 24 hours)     Procedure Component Value Units Date/Time    POC Glucose Once [[id number]]  (Normal) Collected:  08/18/20 1223    Specimen:  Blood Updated:  08/18/20 1227     Glucose 109 mg/dL     POC Glucose Once [[id number]]  (Normal) Collected:  08/18/20 1816    Specimen:  Blood Updated:  08/18/20 1818     Glucose 115 mg/dL     POC Glucose Once [[id number]]  (Normal) Collected:  08/18/20 2350    Specimen:  Blood Updated:  08/19/20 0000     Glucose 113 mg/dL     POC Glucose Once [[id number]]  (Normal) Collected:  08/19/20 0539    Specimen:  Blood Updated:  08/19/20 0540     Glucose 107 mg/dL           Imaging:    I personally reviewed the images from the following radiographic studies.    CT scan of the head done this morning prior to rounds shows a relatively stable appearance of the right frontal hematoma surrounding edema with some interval evolving appearance to the hematoma since the prior studies.  The hematoma had dissected into the thalamus and subcortical structures on the right which explains the profound weakness on the left despite the majority of hematoma being far frontal.  There remains some midline shift but the cisterns remain open.  There is no hydrocephalus and the interventricular hemorrhage is starting to resolve.    Current Medications:   Scheduled Meds:  albuterol sulfate HFA 6 puff Inhalation 4x Daily - RT   amLODIPine 10 mg Oral Q24H   cephalexin 500 mg Nasogastric Q12H   chlorhexidine 15 mL Mouth/Throat Q12H   famotidine 20 mg Nasogastric Daily   hydrALAZINE 50 mg Oral Q8H   insulin regular 0-14 Units Subcutaneous Q6H   levETIRAcetam 750 mg Intravenous BID   sodium chloride 10 mL Intravenous Q12H   sodium chloride 10 mL Intravenous Q12H   sodium chloride 10 mL Intravenous Q12H   sodium chloride 10 mL Intravenous Q12H     Continuous Infusions:  niCARdipine 5-15 mg/hr Last Rate: 15 mg/hr (08/19/20 0648)   propofol  5-50 mcg/kg/min Last Rate: 40 mcg/kg/min (08/19/20 0648)       Assessment/Plan       Intraparenchymal hemorrhage of brain (CMS/HCC)    Supratherapeutic INR      Plan: Patient suffered a devastating injury with the intracranial hemorrhage which dissected into the subcortical structures on the right causing the profound left-sided weakness arm and leg.  She is shown little to no neurologic improvement over the last week and is having issues from the pulmonary standpoint as well.  Her prognosis is quite poor neurologically given this devastating injury.  This is superimposed upon her known acute DVT which cannot be treated with standard anticoagulation.  I will speak with the  again this afternoon but I think at this stage we need to contemplate at least tracheostomy to be able to get her off the ventilator and to rehabilitation.  Obviously we can consider a percutaneous gastrostomy tube as well.  He was struggling with those decisions yesterday.  If he chooses to proceed with PEG and trach then we may need to ask vascular to consider an IVC filter since she cannot be anticoagulated.    Lon Boucher MD  08/19/20  07:27

## 2020-08-19 NOTE — PLAN OF CARE
Problem: Patient Care Overview  Goal: Plan of Care Review  Outcome: Ongoing (interventions implemented as appropriate)  Flowsheets (Taken 8/19/2020 7659)  Progress: declining  Plan of Care Reviewed With: spouse     Problem: Restraint, Nonbehavioral (Nonviolent)  Goal: Rationale and Justification  Outcome: Outcome(s) achieved  Goal: Nonbehavioral (Nonviolent) Restraint: Absence of Injury/Harm  Outcome: Outcome(s) achieved  Goal: Nonbehavioral (Nonviolent) Restraint: Achievement of Discontinuation Criteria  Outcome: Outcome(s) achieved  Goal: Nonbehavioral (Nonviolent) Restraint: Preservation of Dignity and Wellbeing  Outcome: Outcome(s) achieved     Problem: Ventilation, Mechanical Invasive (Adult)  Goal: Signs and Symptoms of Listed Potential Problems Will be Absent, Minimized or Managed (Ventilation, Mechanical Invasive)  Outcome: Outcome(s) achieved     Problem: Nutrition, Enteral (Adult)  Goal: Signs and Symptoms of Listed Potential Problems Will be Absent, Minimized or Managed (Nutrition, Enteral)  Outcome: Outcome(s) achieved

## 2020-08-19 NOTE — PLAN OF CARE
Problem: Patient Care Overview  Goal: Plan of Care Review  Outcome: Ongoing (interventions implemented as appropriate)  Flowsheets (Taken 8/19/2020 9394)  Progress: no change  Plan of Care Reviewed With: patient  Outcome Summary: BP requiring PRN meds per MAR. Cardene and propofol gtts continue. Pt does not follow commands. W/d from pain in all extremities except LUE.  AM CT results pending at time of note. Will continue to monitor.

## 2020-08-19 NOTE — PLAN OF CARE
Problem: Patient Care Overview  Goal: Plan of Care Review  Flowsheets (Taken 8/19/2020 1925)  Outcome Summary: Pt transfer from ICU, s/p extubation. Pt very minimally responsive except to pain. Pt's breathing is labored, tachypnic, grunting present. Pt medicated with morphine and ativan per orders. Pt's  at bedside, oriented to unit. Questions encouraged and answered.  tearful at bedside.  support offered but declined at this time.

## 2020-08-19 NOTE — PROGRESS NOTES
"DOS: 2020  NAME: Afia Bernstein   : 1955  PCP: Jorge Abel MD  Chief Complaint   Patient presents with   • Neuro Deficit(s)         Subjective:   -Caring nurse reported seizure-like activity last night.  Happened once.  No recurrence.  -Stable overnight, no change in her general condition and vital parameters.  -Family decided to withdraw care today.    Vital signs: /60   Pulse 91   Temp 99.2 °F (37.3 °C) (Oral)   Resp 26   Ht 154.9 cm (60.98\")   Wt 115 kg (252 lb 10.4 oz)   SpO2 94%   BMI 47.76 kg/m²      Gen: Sedated, intubated and ventilated.    MS: Cannot be checked  CN: Grossly intact  Vision: Not be checked  Pupils: Normal in size, equal, rounded and reactive.  Motor: Dense left hemiparesis.  Sensory: Withdraw on the right.  Coordination: Cannot be checked  Gait: Cannot be checked          Laboratory results:  Lab Results   Component Value Date    GLUCOSE 111 (H) 2020    CALCIUM 8.0 (L) 2020     2020    K 4.2 2020    CO2 19.5 (L) 2020     (H) 2020    BUN 43 (H) 2020    CREATININE 1.24 (H) 2020    EGFRIFNONA 43 (L) 2020    BCR 34.7 (H) 2020    ANIONGAP 8.5 2020     Lab Results   Component Value Date    WBC 9.53 2020    HGB 10.4 (L) 2020    HCT 31.1 (L) 2020    MCV 92.0 2020     2020     No results found for: CHOL  No results found for: HDL  No results found for: LDL  Lab Results   Component Value Date    TRIG 71 2020     @hgba1c@     Review of labs: No significant abnormality    Review and interpretation of imaging: No new imaging done on her      Assessment:  1.  64 years old right-handed white female with massive right frontal lobe ICH with edema and midline shift.  Patient had stable hospital course but unfortunately she did not make any progress.  She is still deeply comatose, she has a lot of morbidity issues.  Family decided to withdraw care.  2.  No " other issue to address at this point.      Plan:  1.  DC neuro service, call with a question or concern.

## 2020-08-19 NOTE — PROGRESS NOTES
Continued Stay Note  [facility]     Patient Name: Afia Bernstein  MRN: [id number]  Today's Date: 8/19/2020    Admit Date: 8/13/2020    Discharge Plan     Row Name 08/19/20 0907       Plan    Plan  Currently on a vent     Plan Comments  CCP reviewed chart; patient remains on a vent. Considering possible trach and PEG. CCP will follow and assist as needed. Jaimie SMITH         Discharge Codes    No documentation.             LUIS Baer

## 2020-08-20 NOTE — PROGRESS NOTES
Case Management Discharge Note      Final Note: The patient  on 2020 @ 21:50. ARANZA Mccullough RN, CCP.          Destination      No service has been selected for the patient.      Durable Medical Equipment      No service has been selected for the patient.      Dialysis/Infusion      No service has been selected for the patient.      Home Medical Care      No service has been selected for the patient.      Therapy      No service has been selected for the patient.      Community Resources      No service has been selected for the patient.             Final Discharge Disposition Code: 20 -

## 2020-08-20 NOTE — PROGRESS NOTES
Case Management Discharge Note      Final Note: The patient  on 2020 at 2150.  [facility]         Destination      No service has been selected for the patient.      Durable Medical Equipment      No service has been selected for the patient.      Dialysis/Infusion      No service has been selected for the patient.      Home Medical Care      No service has been selected for the patient.      Therapy      No service has been selected for the patient.      Community Resources      No service has been selected for the patient.             Final Discharge Disposition Code: 20 -

## 2020-08-20 NOTE — DISCHARGE SUMMARY
"   DISCHARGE SUMMARY    Patient Name: Afia Bernstein  Age/Sex: 64 y.o. female  : 1955  MRN: [id number]  Patient Care Team:  Jorge Abel MD as PCP - General (Family Medicine)  Moisés Christine MD as Consulting Physician (Gastroenterology)       Date of Admit: 2020  Date of Discharge:  2020    Date of Death:  2020    Disposition: Morgue     Final Diagnoses:   Acute left-sided weakness  Acute right frontal intracerebral hemorrhage  Brain compression with midline shift  Acute encephalopathy from above  Acute respiratory failure s/p mechanical ventilation  LLL MSSA and strep pna   Acute renal failure; resolved  Seizures  Coumadin coagulopathy s/p reversal  Persistent RLE DVT off anticoagulation now   Recent submassive PE -  s/p EKOS   Hypertension  Morbid obesity    History of Present Illness \"64-year-old  female who presents for sudden headache and decreased mental status.  She is currently intubated, mechanically ventilated.  I discussed the case with Dr. Heard from emergency room.  Apparently she started complaining of right-sided headache last night.  She rolled out of bed earlier this morning around 3 AM but she denied any head trauma.  She was brought to the emergency room this morning around 11 AM.  She has been having some left-sided weakness with right-sided headache.  The symptoms were present upon arrival.  Shortly thereafter her mental status decreased and Dr. Heard had to intubate her to protect her airway.  I reviewed old medical records.  Recently she was in the hospital in July and had a DVT and bilateral pulmonary embolism and was placed on Eliquis.  Apparently this was subsequently changed for presumed failure.  She was readmitted to the hospital and finally discharged on Coumadin with Lovenox bridging.\" per       Hospital Course   Patient admitted to the ICU with intracranial bleed and acute respiratory failure requiring intubation and " mechanical ventilation.  She had a significant intracranial bleed along with brain compression and midline shift.  Neurosurgery and neurology were consulted and they have recommended conservative management and serial follow-up CAT scan.  Patient had stable follow-up imaging but continued to have issues with regards to pneumonia and persistent DVT unable to restart anticoagulation.  She had significant encephalopathy as well along with seizures.  She was managed in the ICU all through with the help of multiple consultants including hematology along with neurology and neurosurgery as mentioned above.  She did not make neurological progress and we have recommended tracheostomy and G-tube placement with ventilator weaning to the family and patient's  as well as her family numbers decided that she would not want to be kept alive on machines as per recent discussion and hence decided to withdraw life support at this point.  Emotional support provided and patient was compassionately extubated and passed away soon.    Procedures Performed         Consults:   Consults     Date and Time Order Name Status Description    8/13/2020 1528 Inpatient Neurology Consult General Completed     8/13/2020 1502 Hematology & Oncology Inpatient Consult Completed     8/13/2020 1223 Pulmonology (on-call MD unless specified) Completed     8/13/2020 1200 Neurosurgery (on-call MD unless specified) Completed     8/13/2020 1140 Inpatient Neurology Consult Stroke Completed     8/13/2020 1140 Inpatient Neurology Consult Stroke Completed     7/22/2020 1618 Inpatient Nephrology Consult Completed     7/22/2020 1435 [facility] (on-call MD unless specified) Details Completed     7/22/2020 1402 Hematology and Oncology (on-call MD unless specified) Completed           Pertinent Test Results:   Results from last 7 days   Lab Units 08/19/20  0814 08/18/20  0413 08/17/20  0427 08/16/20  1716 08/16/20  0337 08/15/20  0540 08/14/20  0815 08/14/20  0155  08/13/20  1141   SODIUM mmol/L 142 142 143  --  140 139 134* 133* 136   POTASSIUM mmol/L 4.2 4.6 3.6 4.0 3.3* 3.2* 3.9 3.7 3.8   CHLORIDE mmol/L 114* 111* 120*  --  109* 106 104 101 100   CO2 mmol/L 19.5* 19.8* 16.5*  --  19.0* 22.0 19.8* 24.4 24.8   BUN mg/dL 43* 34* 20  --  16 13 13 15 19   CREATININE mg/dL 1.24* 1.48* 0.97  --  1.01* 1.15* 1.12* 1.05* 1.25*   GLUCOSE mg/dL 111* 145* 107*  --  132* 132* 132* 128* 169*   CALCIUM mg/dL 8.0* 8.0* 6.6*  --  7.9* 8.8 8.2* 8.6 9.4   AST (SGOT) U/L  --  56* 51*  --   --   --  20  --  17   ALT (SGPT) U/L  --  70* 52*  --   --   --  18  --  18     Results from last 7 days   Lab Units 08/13/20  1141   TROPONIN T ng/mL <0.010     Results from last 7 days   Lab Units 08/19/20  0813 08/18/20  0413 08/17/20  0427 08/16/20  0337 08/15/20  0540 08/14/20  0815 08/13/20  1141   WBC 10*3/mm3 9.53 10.02 9.95 12.36* 9.84 9.00 10.95*   HEMOGLOBIN g/dL 10.4* 11.1* 10.6* 11.2* 12.4 11.2* 13.4   HEMATOCRIT % 31.1* 33.3* 31.8* 32.6* 37.0 33.6* 40.6   PLATELETS 10*3/mm3 295 276 239 229 234 218 260   MCV fL 92.0 92.5 94.9 92.6 93.7 95.5 95.8   MCH pg 30.8 30.8 31.6 31.8 31.4 31.8 31.6   MCHC g/dL 33.4 33.3 33.3 34.4 33.5 33.3 33.0   RDW % 13.2 13.0 13.0 12.8 12.9 13.1 13.1   RDW-SD fl 44.5 44.0 45.6 43.9 44.0 45.8 46.6   MPV fL 9.9 10.0 10.0 10.1 10.1 10.1 9.8   NEUTROPHIL % % 75.8  --   --   --  86.4*  --  89.8*   LYMPHOCYTE % % 7.7*  --   --   --  6.0*  --  4.7*   MONOCYTES % % 9.4  --   --   --  6.4  --  4.5*   EOSINOPHIL % % 2.6  --   --   --  0.0*  --  0.0*   BASOPHIL % % 0.8  --   --   --  0.2  --  0.4   IMM GRAN % % 3.7*  --   --   --  1.0*  --  0.6*   NEUTROS ABS 10*3/mm3 7.22*  --   --   --  8.50*  --  9.84*   LYMPHS ABS 10*3/mm3 0.73  --   --   --  0.59*  --  0.51*   MONOS ABS 10*3/mm3 0.90  --   --   --  0.63  --  0.49   EOS ABS 10*3/mm3 0.25  --   --   --  0.00  --  0.00   BASOS ABS 10*3/mm3 0.08  --   --   --  0.02  --  0.04   IMMATURE GRANS (ABS) 10*3/mm3 0.35*  --   --   --   0.10*  --  0.07*   NRBC /100 WBC 0.0  --   --   --  0.0  --  0.0     Results from last 7 days   Lab Units 08/15/20  0540 08/13/20  1846 08/13/20  1525 08/13/20  1141   INR  1.05 0.99 0.97 8.27*   APTT seconds  --   --   --  74.1*     Results from last 7 days   Lab Units 08/18/20  0413 08/17/20  0427 08/16/20  0337   MAGNESIUM mg/dL 2.8* 1.7 1.9     Results from last 7 days   Lab Units 08/17/20  0427   TRIGLYCERIDES mg/dL 71     Results from last 7 days   Lab Units 08/15/20  0540   PROBNP pg/mL 356.7         Results from last 7 days   Lab Units 08/13/20  1417   PH, ARTERIAL pH units 7.455*   PO2 ART mm Hg 108.7*   PCO2, ARTERIAL mm Hg 36.7   HCO3 ART mmol/L 25.8     Results from last 7 days   Lab Units 08/15/20  0540   PROCALCITONIN ng/mL 0.28*             Results from last 7 days   Lab Units 08/15/20  1229   RESPCX  Heavy growth (4+) Staphylococcus aureus*  Heavy growth (4+) Streptococcus agalactiae (Group B)*  No Normal Respiratory Yuli*         Results from last 7 days   Lab Units 08/13/20  1213   ADENOVIRUS DETECTION BY PCR  Not Detected   CORONAVIRUS 229E  Not Detected   CORONAVIRUS HKU1  Not Detected   CORONAVIRUS NL63  Not Detected   CORONAVIRUS OC43  Not Detected   HUMAN METAPNEUMOVIRUS  Not Detected   HUMAN RHINOVIRUS/ENTEROVIRUS  Not Detected   INFLUENZA B PCR  Not Detected   PARAINFLUENZA 1  Not Detected   PARAINFLUENZA VIRUS 2  Not Detected   PARAINFLUENZA VIRUS 3  Not Detected   PARAINFLUENZA VIRUS 4  Not Detected   BORDETELLA PERTUSSIS PCR  Not Detected   CHLAMYDOPHILA PNEUMONIAE PCR  Not Detected   MYCOPLAMA PNEUMO PCR  Not Detected   INFLUENZA A PCR  Not Detected   INFLUENZA A H3  Not Detected   INFLUENZA A H1  Not Detected   RSV, PCR  Not Detected           Imaging Results:  Imaging Results (All)     Procedure Component Value Units Date/Time    CT Head Without Contrast [[id number]] Collected:  08/19/20 0623     Updated:  08/20/20 0500    Narrative:       CT OF THE BRAIN WITHOUT CONTRAST 8/19/2020      HISTORY: Follow-up intracranial hemorrhage.     Axial images were obtained through the brain without intravenous  contrast. The previous study dated 8/17/2020 is compared.     Again seen is an approximately 4.6 cm x 4.3 cm hyperdense hemorrhage in  the right frontal lobe with small amount of surrounding edema. Smaller  hyperdense hemorrhage extends posterior to the larger hemorrhage and  also appears relatively stable. There is additional vasogenic edema in  the more superior right frontoparietal region. There is approximately 7  mm to 8 mm midline shift to the left which appears similar to the  8/17/2020 study.     Small amount of blood is seen layering in the dependent portion of the  left lateral ventricle.     No new areas of hemorrhage are seen.       Impression:       Relatively stable appearance of the brain since the previous  study of 8/17/2020. Large right frontal hyperdense hemorrhage is seen  with midline shift to the left and vasogenic edema in the right cerebral  hemisphere.     Radiation dose reduction techniques were utilized, including automated  exposure control and exposure modulation based on body size.     This report was finalized on 8/20/2020 5:02 AM by Dr. Dre Ann M.D.       CT Head Without Contrast [[id number]] Collected:  08/17/20 0715     Updated:  08/17/20 1229    Narrative:       CT OF THE BRAIN WITHOUT CONTRAST 08/17/2020     HISTORY: Follow-up intraparenchymal hemorrhage.     Axial images were obtained through the brain without intravenous  contrast and compared to the previous study dated 08/16/2020.     FINDINGS: There is an approximately 4.4 cm x 4.2 cm hyperdense  hemorrhage in the right frontal lobe with small amount of surrounding  edema. This appears similar to the appearance on yesterday's study.  There is some additional hemorrhage posterior to this larger area of  hemorrhage which appears somewhat bandlike and appears also similar to  the study from yesterday.  There is approximately 7 mm midline shift to  the left. Small amount of blood is seen layering in the dependent  portion of the occipital horn of the left lateral ventricle and this  also appears similar to yesterday's study. There is mass effect on the  right lateral ventricle.     No new areas of hemorrhage are seen. Minimal mucosal thickening in the  posterior right maxillary sinus is seen.       Impression:       1. Right frontal intraparenchymal hemorrhage with midline shift to the  left. Please see full discussion above.  2. Findings appear similar to yesterday's study.     Radiation dose reduction techniques were utilized, including automated  exposure control and exposure modulation based on body size.     This report was finalized on 8/17/2020 12:25 PM by Dr. Dre Ann M.D.       CT Head Without Contrast [[id number]] Collected:  08/16/20 0635     Updated:  08/16/20 0645    Narrative:       CT HEAD WO CONTRAST-     INDICATIONS: Intracranial hemorrhage, follow-up     TECHNIQUE: Radiation dose reduction techniques were utilized, including  automated exposure control and exposure modulation based on body size.  Noncontrast head CT     COMPARISON: 08/15/2020     FINDINGS:        Right frontal intraparenchymal hematoma is redemonstrated, and does not  appear significantly changed in size, with similar degree of surrounding  edema and local mass effect, leftward midline shift (about 8 mm on axial  image 30), and partial ventricular effacement. The largest component of  of the hematoma is measured at 4.3 x 5.1 cm on axial image 25,  previously measured at 4.3 x 5.3 cm. Amount of intraventricular  hemorrhage does not appear significantly changed.     Minimal mucosal thickening in right maxillary sinus. The visualized  paranasal sinuses, orbits, mastoid air cells are otherwise unremarkable.                Impression:        IMPRESSION:         No significant change.     This report was finalized on 8/16/2020  6:42 AM by Dr. Devin Esteves M.D.       XR Abdomen KUB [[id number]] Collected:  08/15/20 1202     Updated:  08/15/20 1213    Narrative:       XR ABDOMEN KUB-     INDICATIONS: NG tube placement     TECHNIQUE: Supine views of the abdomen     COMPARISON: None available     FINDINGS:     The NG tube extends to the gastroduodenal junction.      The bowel gas pattern is nonobstructive.     Follow-up as clinically indicated.             Impression:          As described.     This report was finalized on 8/15/2020 12:10 PM by Dr. Devin Esteves M.D.       XR Chest 1 View [[id number]] Collected:  08/15/20 1105     Updated:  08/15/20 1110    Narrative:       XR CHEST 1 VW-     HISTORY: Female who is 64 years-old,  left lower lobe  infiltrate/atelectasis     TECHNIQUE: Frontal view of the chest     COMPARISON: 08/13/2020     FINDINGS: Stable appearing endotracheal tube. The heart size appears  borderline enlarged. Persistent appearance of left basilar opacity. Some  opacity is also apparent medially at the right base. These could be  areas of atelectasis or pneumonia, continued follow-up suggested. No  pneumothorax. No acute osseous process.       Impression:       Basilar opacities, continued follow-up suggested.     This report was finalized on 8/15/2020 11:07 AM by Dr. Devin Esteves M.D.       CT Head Without Contrast [[id number]] Collected:  08/15/20 0701     Updated:  08/15/20 0713    Narrative:       CT HEAD WO CONTRAST-     INDICATIONS: Intracranial hemorrhage, follow-up     TECHNIQUE: Radiation dose reduction techniques were utilized, including  automated exposure control and exposure modulation based on body size.  Noncontrast head CT     COMPARISON: 08/14/2020     FINDINGS:     Right frontal intraparenchymal hematoma is redemonstrated, and appears  stable in size but with increased surrounding edema and local mass  effect, increased leftward midline shift (about 8 mm on axial image 31,  previously 5  mm), and increased partial ventricular effacement. The  largest component of of the hematoma again measures about 4.3 x 5.3 cm  on axial image 25. Amount of intraventricular hemorrhage does not appear  significantly changed.     The visualized paranasal sinuses, orbits, mastoid air cells are  unremarkable.                   Impression:          The size of the right frontal intraparenchymal hematoma does not appear  significant changed, but some increased edema around the hematoma is  apparent, with increased mass effect, increased leftward midline shift  of 8 mm, previously 5 mm, increased partial ventricular effacement.     Discussed by telephone with the patient's nurse, Clarisa, at time of  interpretation, 0706, 08/15/20.     This report was finalized on 8/15/2020 7:10 AM by Dr. Devin Esteves M.D.       CT Head Without Contrast [[id number]] Collected:  08/14/20 0457     Updated:  08/14/20 0506    Narrative:       CT HEAD WITHOUT CONTRAST     HISTORY: Intraparenchymal hematoma     COMPARISON: 08/13/2020     TECHNIQUE: Axial CT imaging was obtained through the brain. No IV  contrast was administered.     FINDINGS:  Large right frontal intraparenchymal hematoma is again seen. It is  stable in size at 5.3 x 4.3 cm. There is surrounding vasogenic edema.  There is significant mass effect on the right lateral ventricle, with  near complete effacement of the frontal horn. Midline shift of at least  4 to 5 mm mm is unchanged. There is stable intraventricular hemorrhage  noted as well. No new areas of hemorrhage are seen. There is  periventricular and deep white matter microangiopathic change.       Impression:       Stable appearance to large right frontal intraparenchymal hematoma, as  well as intraventricular hemorrhage.     Radiation dose reduction techniques were utilized, including automated  exposure control and exposure modulation based on body size.     This report was finalized on 8/14/2020 5:02 AM by   Zoie Granados M.D.       CT Head Without Contrast [[id number]] Collected:  08/13/20 2008     Updated:  08/13/20 2024    Narrative:       CT HEAD WO CONTRAST-     INDICATIONS: Intracranial hemorrhage, follow-up     TECHNIQUE: Radiation dose reduction techniques were utilized, including  automated exposure control and exposure modulation based on body size.  Noncontrast head CT     COMPARISON: 08/13/2020     FINDINGS:     Intraparenchymal hemorrhage in the right frontal lobe is redemonstrated,  does not appear significant changed at similar level, and taking into  account differences in positioning, the largest component measured at  4.9 x 4.8 cm on axial image 29. Posterior to this area of hemorrhage, an  area of intraparenchymal hemorrhage measures 3.7 x 1.7 cm on axial image  27, not significantly changed at similar level on the prior exam.  Surrounding edema is present, with mass effect, partial effacement of  the lateral ventricles. Leftward midline shift measures about 9 mm on  axial image 30, previously measured at 10-11 mm.     Slightly increased intraventricular blood is seen, layering in the  posterior horns of the lateral ventricles. The ventricles otherwise  appear stable.           Increased prominence of the superior ophthalmic veins (right more than  left) may reflect increased intracranial pressure.     Minimal mucosal thickening in right maxillary sinus. The visualized  paranasal sinuses, orbits, mastoid air cells are otherwise unremarkable.                   Impression:          The size of intraparenchymal hemorrhage does not appear significantly  changed. Slightly increased intraventricular blood. Leftward midline  shift appears similar to slightly decreased from the prior exam.  Increased prominence of the superior ophthalmic veins may reflect  increased intracranial pressure.     This report was finalized on 8/13/2020 8:21 PM by Dr. Devin Esteves M.D.       CT Head Without Contrast  [[id number]] Collected:  08/13/20 1229     Updated:  08/13/20 1715    Narrative:       CT HEAD WITHOUT CONTRAST     HISTORY: Stroke.     A noncontrasted CT examination of the brain was performed. No prior CT  is available for comparison.     FINDINGS: There is an intraparenchymal hemorrhage involving the right  frontal lobe with surrounding edema. The hemorrhage extends to the  cortical surface laterally and superolaterally. Medially it abuts the  frontal horn and there may be a small component of intraventricular  extension. Hemorrhage then tracks posteriorly and medially to the basal  ganglia. The largest component measures approximately 3.8 x 5.1 x 4.2 cm  in size. The volume is estimated to be approximately 340 mL. The  component extending to the basal ganglia measures approximately 5 x 12 x  12 mm in size anteriorly. Posteriorly the hematoma measures  approximately 19 mm in craniocaudal dimension. There is 10-11 mm of  midline shift to the left. There is no evidence of uncal herniation.       Impression:       Intraparenchymal hemorrhage involving the right frontal lobe  with the largest component measuring approximately 5 cm in size. There  is surrounding edema. The hemorrhage extends to the cortical surface.  There is mass effect on the right lateral ventricle and the hemorrhage  abuts the right frontal horn. There may be a small volume of  intraventricular blood. Hemorrhage also tracks posteriorly and medially  to the basal ganglia as described above. There is approximately 10-11 mm  of midline shift to the left.     The above information was called to and discussed with Dr. Clement and  with Dr. Heard.           Radiation dose reduction techniques were utilized, including automated  exposure control and exposure modulation based on body size.     This report was finalized on 8/13/2020 5:12 PM by Dr. Murray Hilton M.D.       XR Chest 1 View [[id number]] Collected:  08/13/20 1327     Updated:  08/13/20 7137     Narrative:       XR CHEST 1 VW-     Clinical: Intubated     COMPARISON examination 1228 hours, current examination 1259 hours     FINDINGS: There has been interval placement of an endotracheal tube, the  tip superimposes the tracheal air column located 2 cm above the jose j.  This position is satisfactory.     Similar to the previous examination there are somewhat low lung volumes.  There is cardiac enlargement. There is an indeterminate perihilar  density on the left similar to the previous examination. Likewise no  interval change in the left base opacity. Despite the low inspiratory  effort, there could be some vascular congestion.     This report was finalized on 8/13/2020 1:30 PM by Dr. Remberto Whelan M.D.       XR Chest 1 View [[id number]] Collected:  08/13/20 1324     Updated:  08/13/20 1330    Narrative:       XR CHEST 1 VW-     Clinical: Acute stroke protocol     Examination performed at 1228 hours with comparison 7/22/2020     FINDINGS: There is lower inspiratory effort on the current examination  and the projection is apical lordotic. There is cardiac enlargement.  Sizable left perihilar density seen and there is opacity at the left  lung base partially obscuring the left hemidiaphragm. Perihilar density  could be a mass, lymphadenopathy or consolidation/collapse. Left base  process may represent infiltrate or atelectasis and not excluded. The  right lung is clear. No right-sided effusion seen. There is  atherosclerotic calcification of the aorta. The remainder is examination  is unremarkable. PA and lateral view of the chest would be most helpful  when the patient's condition permits.     This report was finalized on 8/13/2020 1:27 PM by Dr. Remberto Whelan M.D.             Toño Montejo MD  08/20/20  07:14

## 2020-08-20 NOTE — PROGRESS NOTES
Discharge Planning Assessment  [facility]     Patient Name: Afia Bernstein  MRN: [id number]  Today's Date: 2020    Admit Date: 2020    Discharge Needs Assessment    No documentation.       Discharge Plan     Row Name 20 1527       Plan    Plan Comments  The patient transferred to [facility] [place] on 2020. The patient was palliative. The patient  on 2020 @ 21:50. ARANZA Mccullough RN, CCP.     Final Discharge Disposition Code  20 -     Final Note  The patient  on 2020 @ 21:50. ARANZA Mccullough RN, CCP.     Row Name 20 1311       Plan    Final Discharge Disposition Code  20 -     Final Note  The patient  on 2020 at 2150.  LUIS Berry        Destination      Coordination has not been started for this encounter.      Durable Medical Equipment      Coordination has not been started for this encounter.      Dialysis/Infusion      Coordination has not been started for this encounter.      Home Medical Care      Coordination has not been started for this encounter.      Therapy      Coordination has not been started for this encounter.      Community Resources      Coordination has not been started for this encounter.        Expected Discharge Date and Time     Expected Discharge Date Expected Discharge Time    Aug 20, 2020         Demographic Summary    No documentation.       Functional Status    No documentation.       Psychosocial    No documentation.       Abuse/Neglect    No documentation.       Legal    No documentation.       Substance Abuse    No documentation.       Patient Forms    No documentation.           Macarena Mccullough RN

## 2020-08-21 LAB — CREAT BLDA-MCNC: 1.2 MG/DL (ref 0.6–1.3)

## (undated) DEVICE — GW INQWIRE FC PTFE J/3MM .021 180

## (undated) DEVICE — Device

## (undated) DEVICE — CATH DIAG IMPULSE FR4 5F 100CM

## (undated) DEVICE — KT MANIFLD CARDIAC

## (undated) DEVICE — BALN PRESS WEDGE 5F 110CM

## (undated) DEVICE — PK CATH CARD 40

## (undated) DEVICE — INTRO SHEATH ART/FEM ENGAGE .035 6F12CM

## (undated) DEVICE — GW EMR FIX EXCHG J STD .035 3MM 260CM

## (undated) DEVICE — CATH EKOSONIC MACH4 DS 5.2F 12X106CM